# Patient Record
Sex: MALE | Race: BLACK OR AFRICAN AMERICAN | Employment: FULL TIME | URBAN - METROPOLITAN AREA
[De-identification: names, ages, dates, MRNs, and addresses within clinical notes are randomized per-mention and may not be internally consistent; named-entity substitution may affect disease eponyms.]

---

## 2017-01-05 NOTE — TELEPHONE ENCOUNTER
LOV: 7/1/16 for hyperlipidemia  BP at time of visit: 138/88     AmLODIPine Besylate 10 MG Oral Tab 90 tablet 1 7/1/2016       Sig :  TAKE ONE TABLET BY MOUTH ONCE DAILY       Route:   (none)       lisinopril 40 MG Oral Tab 90 tablet 1 7/1/2016       Sig :

## 2017-01-06 ENCOUNTER — OFFICE VISIT (OUTPATIENT)
Dept: FAMILY MEDICINE CLINIC | Facility: CLINIC | Age: 47
End: 2017-01-06

## 2017-01-06 ENCOUNTER — TELEPHONE (OUTPATIENT)
Dept: FAMILY MEDICINE CLINIC | Facility: CLINIC | Age: 47
End: 2017-01-06

## 2017-01-06 VITALS
SYSTOLIC BLOOD PRESSURE: 156 MMHG | TEMPERATURE: 99 F | DIASTOLIC BLOOD PRESSURE: 102 MMHG | HEART RATE: 108 BPM | OXYGEN SATURATION: 98 % | BODY MASS INDEX: 29 KG/M2 | WEIGHT: 207 LBS | RESPIRATION RATE: 16 BRPM

## 2017-01-06 DIAGNOSIS — R74.8 ELEVATED CK: ICD-10-CM

## 2017-01-06 DIAGNOSIS — I10 ESSENTIAL HYPERTENSION: Primary | ICD-10-CM

## 2017-01-06 DIAGNOSIS — H57.89 RED EYES: ICD-10-CM

## 2017-01-06 DIAGNOSIS — N52.9 ERECTILE DYSFUNCTION, UNSPECIFIED ERECTILE DYSFUNCTION TYPE: ICD-10-CM

## 2017-01-06 DIAGNOSIS — E78.00 PURE HYPERCHOLESTEROLEMIA: ICD-10-CM

## 2017-01-06 PROCEDURE — 99214 OFFICE O/P EST MOD 30 MIN: CPT | Performed by: FAMILY MEDICINE

## 2017-01-06 RX ORDER — AMLODIPINE BESYLATE 10 MG/1
TABLET ORAL
Qty: 90 TABLET | Refills: 1 | Status: SHIPPED | OUTPATIENT
Start: 2017-01-06 | End: 2017-06-21

## 2017-01-06 RX ORDER — LISINOPRIL 40 MG/1
TABLET ORAL
Qty: 90 TABLET | Refills: 1 | Status: SHIPPED | OUTPATIENT
Start: 2017-01-06 | End: 2017-06-21

## 2017-01-06 RX ORDER — TADALAFIL 10 MG/1
10 TABLET ORAL
Qty: 6 TABLET | Refills: 0 | Status: SHIPPED | OUTPATIENT
Start: 2017-01-06 | End: 2017-03-14

## 2017-01-06 RX ORDER — LISINOPRIL 40 MG/1
TABLET ORAL
Qty: 90 TABLET | Refills: 0 | OUTPATIENT
Start: 2017-01-06

## 2017-01-06 RX ORDER — AMLODIPINE BESYLATE 10 MG/1
TABLET ORAL
Qty: 90 TABLET | Refills: 0 | OUTPATIENT
Start: 2017-01-06

## 2017-01-06 NOTE — TELEPHONE ENCOUNTER
appt scheduled  Future Appointments  Date Time Provider Brian Shelia   1/6/2017 2:00 PM Anastasiia Goodman MD [facility]

## 2017-01-06 NOTE — PROGRESS NOTES
Jenniefr North is a 42year old male. HPI:   Patient presents for recheck of his hypertension. Out of med for 2 days. Has not been taking med on regular basis. No medication side effects. Home BP monitoring 140s/80s when on meds.     Requesting Multiple Vitamin (MULTI VITAMIN DAILY OR) Take by mouth. Disp:  Rfl:       Past Medical History   Diagnosis Date   • Unspecified essential hypertension    • Other and unspecified hyperlipidemia    • Pre-diabetes       History reviewed.  No pertinent past

## 2017-01-11 ENCOUNTER — NURSE ONLY (OUTPATIENT)
Dept: FAMILY MEDICINE CLINIC | Facility: CLINIC | Age: 47
End: 2017-01-11

## 2017-01-11 DIAGNOSIS — R74.8 ELEVATED CK: ICD-10-CM

## 2017-01-11 DIAGNOSIS — E78.00 PURE HYPERCHOLESTEROLEMIA: ICD-10-CM

## 2017-01-11 LAB
ALBUMIN SERPL-MCNC: 4.4 G/DL (ref 3.5–4.8)
ALP LIVER SERPL-CCNC: 101 U/L (ref 45–117)
ALT SERPL-CCNC: 99 U/L (ref 17–63)
AST SERPL-CCNC: 70 U/L (ref 15–41)
BILIRUB SERPL-MCNC: 0.6 MG/DL (ref 0.1–2)
BUN BLD-MCNC: 13 MG/DL (ref 8–20)
CALCIUM BLD-MCNC: 9.4 MG/DL (ref 8.3–10.3)
CHLORIDE: 105 MMOL/L (ref 101–111)
CHOLEST SMN-MCNC: 301 MG/DL (ref ?–200)
CK: 1774 IU/L (ref 39–308)
CKMB: 7.5 NG/ML (ref 0–5)
CO2: 26 MMOL/L (ref 22–32)
CREAT BLD-MCNC: 1.35 MG/DL (ref 0.7–1.3)
GLUCOSE BLD-MCNC: 124 MG/DL (ref 70–99)
HDLC SERPL-MCNC: 66 MG/DL (ref 45–?)
HDLC SERPL: 4.56 {RATIO} (ref ?–4.97)
LDLC SERPL CALC-MCNC: 210 MG/DL (ref ?–130)
M PROTEIN MFR SERPL ELPH: 7.9 G/DL (ref 6.1–8.3)
MB INDEX: <1 % (ref ?–4)
NONHDLC SERPL-MCNC: 235 MG/DL (ref ?–130)
POTASSIUM SERPL-SCNC: 4.4 MMOL/L (ref 3.6–5.1)
SODIUM SERPL-SCNC: 137 MMOL/L (ref 136–144)
TRIGLYCERIDES: 125 MG/DL (ref ?–150)
VLDL: 25 MG/DL (ref 5–40)

## 2017-01-11 PROCEDURE — 82553 CREATINE MB FRACTION: CPT | Performed by: FAMILY MEDICINE

## 2017-01-11 PROCEDURE — 82550 ASSAY OF CK (CPK): CPT | Performed by: FAMILY MEDICINE

## 2017-01-11 PROCEDURE — 36415 COLL VENOUS BLD VENIPUNCTURE: CPT | Performed by: FAMILY MEDICINE

## 2017-01-11 PROCEDURE — 80053 COMPREHEN METABOLIC PANEL: CPT | Performed by: FAMILY MEDICINE

## 2017-01-11 PROCEDURE — 80061 LIPID PANEL: CPT | Performed by: FAMILY MEDICINE

## 2017-03-13 ENCOUNTER — TELEPHONE (OUTPATIENT)
Dept: FAMILY MEDICINE CLINIC | Facility: CLINIC | Age: 47
End: 2017-03-13

## 2017-03-13 NOTE — TELEPHONE ENCOUNTER
Pt reports taking medication 2 times daily. Please advise if pt is suppose to take once daily or twice daily.         lisinopril 40 MG Oral Tab 90 tablet 1 1/6/2017       Sig :  TAKE ONE TABLET BY MOUTH ONCE DAILY       Route:   (none)       AmLODIPine Bes

## 2017-03-14 PROCEDURE — 83883 ASSAY NEPHELOMETRY NOT SPEC: CPT | Performed by: INTERNAL MEDICINE

## 2017-03-14 PROCEDURE — 82746 ASSAY OF FOLIC ACID SERUM: CPT | Performed by: INTERNAL MEDICINE

## 2017-03-14 PROCEDURE — 84165 PROTEIN E-PHORESIS SERUM: CPT | Performed by: INTERNAL MEDICINE

## 2017-03-14 PROCEDURE — 82607 VITAMIN B-12: CPT | Performed by: INTERNAL MEDICINE

## 2017-03-14 PROCEDURE — 82085 ASSAY OF ALDOLASE: CPT | Performed by: INTERNAL MEDICINE

## 2017-03-14 PROCEDURE — 83874 ASSAY OF MYOGLOBIN: CPT | Performed by: INTERNAL MEDICINE

## 2017-03-14 PROCEDURE — 86235 NUCLEAR ANTIGEN ANTIBODY: CPT | Performed by: INTERNAL MEDICINE

## 2017-03-14 PROCEDURE — 86038 ANTINUCLEAR ANTIBODIES: CPT | Performed by: INTERNAL MEDICINE

## 2017-03-14 PROCEDURE — 86334 IMMUNOFIX E-PHORESIS SERUM: CPT | Performed by: INTERNAL MEDICINE

## 2017-03-14 NOTE — TELEPHONE ENCOUNTER
Both meds are once daily. Does he have any home BP readings? He is overdue for OV. Please schedule this week.

## 2017-03-14 NOTE — TELEPHONE ENCOUNTER
NORRIS Vega  Patient advised he is only suppose to be taking medication once daily. Patient scheduled appointment for Monday. Advised patient to take blood pressure every day until his appointment and bring in readings for Dr. Shaan Vega.   Patient verbalized

## 2017-03-27 ENCOUNTER — TELEPHONE (OUTPATIENT)
Dept: FAMILY MEDICINE CLINIC | Facility: CLINIC | Age: 47
End: 2017-03-27

## 2017-03-27 NOTE — TELEPHONE ENCOUNTER
The ordered physician, in this case rheum, will be discussing this BW with you. I can tell him that CK level is 2022 and his liver function is elevated. Rest of BW looks ok. Please have pt call them to discuss BW further and next steps.

## 2017-03-29 ENCOUNTER — HOSPITAL ENCOUNTER (OUTPATIENT)
Age: 47
Discharge: HOME | End: 2017-03-29
Attending: EMERGENCY MEDICINE
Payer: COMMERCIAL

## 2017-03-29 VITALS
RESPIRATION RATE: 16 BRPM | TEMPERATURE: 99 F | DIASTOLIC BLOOD PRESSURE: 99 MMHG | HEART RATE: 89 BPM | OXYGEN SATURATION: 99 % | SYSTOLIC BLOOD PRESSURE: 151 MMHG

## 2017-03-29 DIAGNOSIS — K08.89 PAIN, DENTAL: Primary | ICD-10-CM

## 2017-03-29 PROCEDURE — 99214 OFFICE O/P EST MOD 30 MIN: CPT

## 2017-03-29 PROCEDURE — 99213 OFFICE O/P EST LOW 20 MIN: CPT

## 2017-03-29 RX ORDER — CLINDAMYCIN HYDROCHLORIDE 300 MG/1
300 CAPSULE ORAL 3 TIMES DAILY
Qty: 30 CAPSULE | Refills: 0 | Status: SHIPPED | OUTPATIENT
Start: 2017-03-29 | End: 2017-04-08

## 2017-03-29 NOTE — ED PROVIDER NOTES
Patient presents with:  Dental Problem (dental)    HPI:     Trish Maria is a 42year old male who presents with chief complaint of dental pain. Started about 3 days ago. Has been taking aleve which has helped.   States this tooth has bothered him pr

## 2017-06-21 ENCOUNTER — LAB ENCOUNTER (OUTPATIENT)
Dept: LAB | Age: 47
End: 2017-06-21
Attending: FAMILY MEDICINE
Payer: COMMERCIAL

## 2017-06-21 ENCOUNTER — OFFICE VISIT (OUTPATIENT)
Dept: FAMILY MEDICINE CLINIC | Facility: CLINIC | Age: 47
End: 2017-06-21

## 2017-06-21 VITALS
HEIGHT: 71 IN | OXYGEN SATURATION: 98 % | WEIGHT: 196.81 LBS | SYSTOLIC BLOOD PRESSURE: 130 MMHG | HEART RATE: 100 BPM | BODY MASS INDEX: 27.55 KG/M2 | TEMPERATURE: 98 F | RESPIRATION RATE: 14 BRPM | DIASTOLIC BLOOD PRESSURE: 84 MMHG

## 2017-06-21 DIAGNOSIS — I10 ESSENTIAL HYPERTENSION: Primary | ICD-10-CM

## 2017-06-21 DIAGNOSIS — R25.2 MUSCLE CRAMPING: ICD-10-CM

## 2017-06-21 DIAGNOSIS — R73.09 ELEVATED HEMOGLOBIN A1C: ICD-10-CM

## 2017-06-21 DIAGNOSIS — R35.0 INCREASED URINARY FREQUENCY: ICD-10-CM

## 2017-06-21 DIAGNOSIS — I10 ESSENTIAL HYPERTENSION: ICD-10-CM

## 2017-06-21 PROCEDURE — 82553 CREATINE MB FRACTION: CPT

## 2017-06-21 PROCEDURE — 83036 HEMOGLOBIN GLYCOSYLATED A1C: CPT

## 2017-06-21 PROCEDURE — 82550 ASSAY OF CK (CPK): CPT

## 2017-06-21 PROCEDURE — 36415 COLL VENOUS BLD VENIPUNCTURE: CPT

## 2017-06-21 PROCEDURE — 80053 COMPREHEN METABOLIC PANEL: CPT

## 2017-06-21 PROCEDURE — 99213 OFFICE O/P EST LOW 20 MIN: CPT | Performed by: FAMILY MEDICINE

## 2017-06-21 RX ORDER — AMLODIPINE BESYLATE 10 MG/1
TABLET ORAL
Qty: 90 TABLET | Refills: 3 | Status: SHIPPED | OUTPATIENT
Start: 2017-06-21 | End: 2018-01-31

## 2017-06-21 RX ORDER — LISINOPRIL 40 MG/1
TABLET ORAL
Qty: 90 TABLET | Refills: 3 | Status: SHIPPED | OUTPATIENT
Start: 2017-06-21 | End: 2018-01-31

## 2017-06-21 RX ORDER — AMLODIPINE BESYLATE 10 MG/1
TABLET ORAL
Qty: 90 TABLET | Refills: 1 | Status: CANCELLED | OUTPATIENT
Start: 2017-06-21

## 2017-06-21 RX ORDER — LISINOPRIL 40 MG/1
TABLET ORAL
Qty: 90 TABLET | Refills: 1 | Status: CANCELLED | OUTPATIENT
Start: 2017-06-21

## 2017-06-21 NOTE — TELEPHONE ENCOUNTER
Appointment scheduled.   Future Appointments  Date Time Provider Brian Shelia   6/21/2017 10:40 AM Elvira Jo MD [facility] [place]

## 2017-06-21 NOTE — PROGRESS NOTES
Chelita Javier is a 41year old male. HPI:   Patient presents for recheck of his hypertension. Taking meds daily. No med side effects. Has hx of elevated CK level of undetermined cause. Saw rheum once but has not followed up.   Wakes up often w surgical history.    Social History:      Smoking Status: Current Some Day Smoker         Packs/Day: 0.00  Years:           Types: Cigars    Smokeless Status: Never Used                        Alcohol Use: No                 Comment: social        REVIEW OF

## 2017-06-21 NOTE — TELEPHONE ENCOUNTER
LOV: 1/6/17 for HTN  BP at time of visit: 156/102     Left message for patient to inform due for 6 month follow up for HTN with Dr. Arnulfo Medrano.     AmLODIPine Besylate 10 MG Oral Tab 90 tablet 1 1/6/2017       Sig :  TAKE ONE TABLET BY MOUTH ONCE DAILY       Rout

## 2017-08-24 ENCOUNTER — TELEPHONE (OUTPATIENT)
Dept: FAMILY MEDICINE CLINIC | Facility: CLINIC | Age: 47
End: 2017-08-24

## 2017-08-24 NOTE — TELEPHONE ENCOUNTER
Patient was told to call Dr. Dipika Blair when his appointment with Dr. Verlena Apley is coming up so she can call Dr. Verlena Apley and let him know what is going on with the patient. Patient's appointment is Monday at 12:20. Please advise.

## 2017-08-24 NOTE — TELEPHONE ENCOUNTER
Pt called stated he has an appt with Dr. Verlena Apley Rhumotologist on Monday. Pt would like to talk to Dr Dipika Blair regarding this appt with this

## 2017-08-28 PROCEDURE — 86038 ANTINUCLEAR ANTIBODIES: CPT | Performed by: INTERNAL MEDICINE

## 2017-08-28 PROCEDURE — 82085 ASSAY OF ALDOLASE: CPT | Performed by: INTERNAL MEDICINE

## 2017-08-28 PROCEDURE — 83874 ASSAY OF MYOGLOBIN: CPT | Performed by: INTERNAL MEDICINE

## 2017-09-25 NOTE — TELEPHONE ENCOUNTER
LMTCB.   Re:  Encourage pt to complete test Dr. Heidy Duffy has recommended if he hasn't already done so

## 2017-10-05 ENCOUNTER — PATIENT OUTREACH (OUTPATIENT)
Dept: FAMILY MEDICINE CLINIC | Facility: CLINIC | Age: 47
End: 2017-10-05

## 2017-11-07 ENCOUNTER — TELEPHONE (OUTPATIENT)
Dept: FAMILY MEDICINE CLINIC | Facility: CLINIC | Age: 47
End: 2017-11-07

## 2017-11-07 NOTE — TELEPHONE ENCOUNTER
Patient had questions about prostate exam.  Patient wanted to know last time this was checked. Informed patient PSA screening blood work was done on 6/21/17 and if lab results are normal it is done yearly.   Also informed patient digital rectal exams are u

## 2017-12-08 ENCOUNTER — HOSPITAL ENCOUNTER (OUTPATIENT)
Age: 47
Discharge: HOME | End: 2017-12-08
Payer: COMMERCIAL

## 2017-12-08 VITALS
HEIGHT: 71 IN | SYSTOLIC BLOOD PRESSURE: 170 MMHG | BODY MASS INDEX: 27.3 KG/M2 | TEMPERATURE: 98 F | HEART RATE: 100 BPM | WEIGHT: 195 LBS | DIASTOLIC BLOOD PRESSURE: 100 MMHG | RESPIRATION RATE: 16 BRPM

## 2017-12-08 DIAGNOSIS — K08.89 PAIN, DENTAL: Primary | ICD-10-CM

## 2017-12-08 PROCEDURE — 99213 OFFICE O/P EST LOW 20 MIN: CPT

## 2017-12-08 PROCEDURE — 99212 OFFICE O/P EST SF 10 MIN: CPT

## 2017-12-08 RX ORDER — NAPROXEN 250 MG/1
250 TABLET ORAL
Qty: 20 TABLET | Refills: 0 | Status: SHIPPED | OUTPATIENT
Start: 2017-12-08 | End: 2018-01-31

## 2017-12-08 NOTE — ED PROVIDER NOTES
Patient Seen in: [id number] [facility] [place]    History   Patient presents with:  Dental Problem    Stated Complaint: tooth pain    60-year-old male presents today with complaints of right lower tooth pain.   Was here was here in March for similar sym Current:BP (!) 170/100   Pulse 100   Temp 98 °F (36.7 °C) (Temporal)   Resp 16   Ht 180.3 cm (5' 11\")   Wt 88.5 kg   BMI 27.20 kg/m²         Physical Exam   Constitutional: He is oriented to person, place, and time.  He appears well-developed and well-nour

## 2017-12-08 NOTE — ED INITIAL ASSESSMENT (HPI)
Pt seen 3/2017 for same tooth infection. States was given antibiotic and got better so he did not follow up with a dentist. Past couple of days worsening pain in same tooth. No facial swelling. No fever.  States has not taken his BP meds in 4 days and BP is

## 2018-01-31 ENCOUNTER — OFFICE VISIT (OUTPATIENT)
Dept: FAMILY MEDICINE CLINIC | Facility: CLINIC | Age: 48
End: 2018-01-31

## 2018-01-31 ENCOUNTER — TELEPHONE (OUTPATIENT)
Dept: FAMILY MEDICINE CLINIC | Facility: CLINIC | Age: 48
End: 2018-01-31

## 2018-01-31 VITALS
TEMPERATURE: 97 F | WEIGHT: 200 LBS | SYSTOLIC BLOOD PRESSURE: 132 MMHG | DIASTOLIC BLOOD PRESSURE: 86 MMHG | RESPIRATION RATE: 20 BRPM | OXYGEN SATURATION: 98 % | BODY MASS INDEX: 28 KG/M2 | HEIGHT: 71 IN | HEART RATE: 100 BPM

## 2018-01-31 DIAGNOSIS — Z79.899 MEDICATION MANAGEMENT: ICD-10-CM

## 2018-01-31 DIAGNOSIS — R39.89 URETHRAL PAIN: ICD-10-CM

## 2018-01-31 DIAGNOSIS — N48.89 PENILE PAIN: ICD-10-CM

## 2018-01-31 DIAGNOSIS — R00.2 PALPITATION: Primary | ICD-10-CM

## 2018-01-31 DIAGNOSIS — E78.00 PURE HYPERCHOLESTEROLEMIA: ICD-10-CM

## 2018-01-31 DIAGNOSIS — I10 ESSENTIAL HYPERTENSION: ICD-10-CM

## 2018-01-31 LAB
APPEARANCE: CLEAR
MULTISTIX LOT#: NORMAL NUMERIC
PH, URINE: 5.5 (ref 4.5–8)
PROTEIN (URINE DIPSTICK): 100 MG/DL
SPECIFIC GRAVITY: 1.01 (ref 1–1.03)
TSI SER-ACNC: 2.15 MIU/ML (ref 0.35–5.5)
URINE-COLOR: YELLOW
UROBILINOGEN,SEMI-QN: 0.2 MG/DL (ref 0–1.9)

## 2018-01-31 PROCEDURE — 87086 URINE CULTURE/COLONY COUNT: CPT | Performed by: FAMILY MEDICINE

## 2018-01-31 PROCEDURE — 81003 URINALYSIS AUTO W/O SCOPE: CPT | Performed by: FAMILY MEDICINE

## 2018-01-31 PROCEDURE — 84443 ASSAY THYROID STIM HORMONE: CPT | Performed by: FAMILY MEDICINE

## 2018-01-31 PROCEDURE — 99214 OFFICE O/P EST MOD 30 MIN: CPT | Performed by: FAMILY MEDICINE

## 2018-01-31 PROCEDURE — 93000 ELECTROCARDIOGRAM COMPLETE: CPT | Performed by: FAMILY MEDICINE

## 2018-01-31 RX ORDER — AMLODIPINE BESYLATE 10 MG/1
TABLET ORAL
Qty: 90 TABLET | Refills: 3 | Status: SHIPPED | OUTPATIENT
Start: 2018-01-31 | End: 2018-02-02

## 2018-01-31 RX ORDER — LISINOPRIL 40 MG/1
TABLET ORAL
Qty: 90 TABLET | Refills: 3 | Status: SHIPPED | OUTPATIENT
Start: 2018-01-31 | End: 2018-02-02

## 2018-01-31 NOTE — PROGRESS NOTES
Janey Felty is a 41year old male. HPI:   Patient presents for multiple issues. Recheck on hypertension. Taking meds daily. No med s/e. Does not check at home. No HA or dizziness reported.     Pain on bottom of shaft of penis for 4 months Medical History:   Diagnosis Date   • Elevated CK    • Other and unspecified hyperlipidemia    • Pre-diabetes    • Unspecified essential hypertension       History reviewed. No pertinent surgical history.    Social History:    Smoking status: Current Some D unknown etio. Encouraged him to get test scheduled soon as possible  Due for annual px in June.  Further recs after test results

## 2018-01-31 NOTE — TELEPHONE ENCOUNTER
Patient called and wanted to let Dr Olga Farley know that he made his appointment for his MRI and Stress Test for 02/14/18 at 10 am.

## 2018-02-01 ENCOUNTER — NURSE ONLY (OUTPATIENT)
Dept: FAMILY MEDICINE CLINIC | Facility: CLINIC | Age: 48
End: 2018-02-01

## 2018-02-01 DIAGNOSIS — R39.89 URETHRAL PAIN: Primary | ICD-10-CM

## 2018-02-01 PROCEDURE — 87591 N.GONORRHOEAE DNA AMP PROB: CPT | Performed by: FAMILY MEDICINE

## 2018-02-01 PROCEDURE — 87491 CHLMYD TRACH DNA AMP PROBE: CPT | Performed by: FAMILY MEDICINE

## 2018-02-02 LAB
C TRACH DNA SPEC QL NAA+PROBE: NEGATIVE
N GONORRHOEA DNA SPEC QL NAA+PROBE: NEGATIVE

## 2018-02-02 RX ORDER — LISINOPRIL 40 MG/1
TABLET ORAL
Qty: 90 TABLET | Refills: 3 | Status: SHIPPED
Start: 2018-02-02 | End: 2020-06-01

## 2018-02-02 RX ORDER — AMLODIPINE BESYLATE 10 MG/1
TABLET ORAL
Qty: 90 TABLET | Refills: 3 | Status: SHIPPED
Start: 2018-02-02 | End: 2020-06-01

## 2018-02-02 NOTE — TELEPHONE ENCOUNTER
lisinopril 40 MG Oral Tab 90 tablet 3 1/31/2018     AmLODIPine Besylate 10 MG Oral Tab 90 tablet 3 1/31/2018     Last labs 01/31/18 Assay,thyroid and urine. Last seen on 01/31/18. /86.   Future Appointments  Date Time Provider Brian Bass   2/1

## 2018-02-02 NOTE — TELEPHONE ENCOUNTER
From: Dago Anglin  Sent: 2/2/2018 9:46 AM CST  Subject: Medication Renewal Request    Dago Anglin would like a refill of the following medications:     lisinopril 40 MG Oral Tab Lashon Solomon MD]     AmLODIPine Besylate 10 MG Oral Tab Todd Sutton

## 2018-02-14 ENCOUNTER — PATIENT MESSAGE (OUTPATIENT)
Dept: FAMILY MEDICINE CLINIC | Facility: CLINIC | Age: 48
End: 2018-02-14

## 2018-02-14 NOTE — TELEPHONE ENCOUNTER
From: Alissa Alvarenga  To: Justen Patton MD  Sent: 2/14/2018 11:31 AM CST  Subject: Other    The Mri test didn't go as plan ! I started to cramp very bad once inside and was afraid the test won't be accurate!  Being in the tube I could tolerate but when

## 2018-02-16 NOTE — TELEPHONE ENCOUNTER
Called pt to follow up. He got in touch with Dr. Desirae Flowers and they will order MRI with sedation.

## 2018-06-29 NOTE — LETTER
09/26/22    Jazlyn Hays   [address]            Dear Demarcus Monday records indicate that you have outstanding lab work and/or testing that was ordered for you and has not yet been completed:  Lab Frequency Next Occurrence   CBC WITH DIFFERENTIAL WITH PLATELET Once 14/55/3021   COMP METABOLIC PANEL (14) Once 08/79/8345   LIPID PANEL Once 07/27/2022   HEMOGLOBIN A1C Once 07/27/2022   TSH W REFLEX TO FREE T4 Once 07/27/2022   PSA SCREEN Once 07/27/2022      To provide you with the best possible care, please complete these orders at your earliest convenience. If you have recently completed these orders please disregard this letter. To schedule labwork please call to schedule at [phone]. *If you prefer to use [facility] for your labs please let us know so we can fax your orders. To schedule Imaging or Procedures please call Central Scheduling at [phone]. If you have any questions please call the office at [phone].      Thank you,    [facility]  ACTIVITY  - Use sling at all times while numb. . Must sleep in sling until full sensation and control returns. May remove sling only when sensation and control returns. Range of motion as tolerated  - Limited activity today; increase activity tomorrow, but no vigorous exercise. .Protect arm while numb. -  Keep dressing dry and intact for 3 days. May remove and cover with waterproof band aids to shower. No tub baths for 2 weeks. - Physical therapy as scheduled. - Ice pack to area       DIET  - Clear liquids until no nausea or vomiting.  - Advance to regular diet as tolerated. Avoid greasy and spicy food today to reduce nausea. PAIN  - Expect a moderate amount of pain. Begin taking pain pills when sensation returns or at dinner/ bedtime even if still numb. - Take pain medication as directed by your doctor. If no prescription for pain is sent home with you, take the appropriate dose of your commonly used pain medication.  - Call your doctor if pain is NOT relieved by medication. - DO NOT take aspirin or blood thinners until directed by your doctor. Take pills with food to reduce nausea  May cause constipation Use stool softener    DRESSING CARE  - Keep dressing clean and dry for 3 days. May remove and cover with waterproof band aids to shower. No tub baths for 2 weeks. FOLLOW UP PHONE CALL  - Call will be made by nursing staff. - If you have any problems or concerns, call your doctor as needed. CALL YOUR DOCTOR IF  - Excessive bleeding that does not stop after holding mild pressure over the area for 15 minutes. - Any color, temperature, or sensation changes in the operative arm/hand.  - Temperature of 101F degrees or above. - Green or yellow, smelly drainage.  - Nausea and vomiting that does not stop by bedtime. AFTER ANESTHESIA   - For the next 24 hours:  DO NOT Drive; Drink Alcoholic Beverages;  Make important decisions.  - Be aware of dizziness following anesthesia and while taking pain medication.  - Plan to stay within one hour's drive of the hospital.    Neno Henderson DATE/TIME__Keep scheduled appointment ___________________________________    Juan Mo PHONE NUMBER____[phone]__________________

## 2018-07-27 ENCOUNTER — TELEPHONE (OUTPATIENT)
Dept: FAMILY MEDICINE CLINIC | Facility: CLINIC | Age: 48
End: 2018-07-27

## 2018-07-27 NOTE — TELEPHONE ENCOUNTER
----- Message from Carlos Moran MD sent at 7/27/2018  3:26 PM CDT -----  Regarding: RE: overdue lab  I would like him to get this done. Lets try to get this scheduled.   Lynne Meadows  ----- Message -----  From: Venancio Jo  Sent: 7/27/2018   2:45 PM  To: Irma Mares

## 2018-07-31 NOTE — TELEPHONE ENCOUNTER
Patient advised and verbalized understanding. Appointment scheduled.   Future Appointments  Date Time Provider Brian Bass   8/4/2018 10:00 AM [facility] N[facility] [place]

## 2018-08-04 ENCOUNTER — NURSE ONLY (OUTPATIENT)
Dept: FAMILY MEDICINE CLINIC | Facility: CLINIC | Age: 48
End: 2018-08-04
Payer: COMMERCIAL

## 2018-08-04 DIAGNOSIS — R73.09 ELEVATED HEMOGLOBIN A1C: ICD-10-CM

## 2018-08-04 DIAGNOSIS — E78.49 OTHER HYPERLIPIDEMIA: ICD-10-CM

## 2018-08-04 DIAGNOSIS — R74.8 ELEVATED CK: Primary | ICD-10-CM

## 2018-08-04 LAB
ALBUMIN SERPL-MCNC: 4.4 G/DL (ref 3.5–4.8)
ALBUMIN/GLOB SERPL: 1 {RATIO} (ref 1–2)
ALP LIVER SERPL-CCNC: 110 U/L (ref 45–117)
ALT SERPL-CCNC: 78 U/L (ref 17–63)
ANION GAP SERPL CALC-SCNC: 12 MMOL/L (ref 0–18)
AST SERPL-CCNC: 53 U/L (ref 15–41)
BASOPHILS # BLD AUTO: 0.08 X10(3) UL (ref 0–0.1)
BASOPHILS NFR BLD AUTO: 1 %
BILIRUB SERPL-MCNC: 0.6 MG/DL (ref 0.1–2)
BUN BLD-MCNC: 19 MG/DL (ref 8–20)
BUN/CREAT SERPL: 13.2 (ref 10–20)
CALCIUM BLD-MCNC: 10.7 MG/DL (ref 8.3–10.3)
CHLORIDE SERPL-SCNC: 97 MMOL/L (ref 101–111)
CHOLEST SMN-MCNC: 276 MG/DL (ref ?–200)
CK SERPL-CCNC: 1130 IU/L (ref 39–308)
CO2 SERPL-SCNC: 27 MMOL/L (ref 22–32)
COMPLEXED PSA SERPL-MCNC: 1.8 NG/ML (ref 0.01–4)
CREAT BLD-MCNC: 1.44 MG/DL (ref 0.7–1.3)
EOSINOPHIL # BLD AUTO: 0.1 X10(3) UL (ref 0–0.3)
EOSINOPHIL NFR BLD AUTO: 1.3 %
ERYTHROCYTE [DISTWIDTH] IN BLOOD BY AUTOMATED COUNT: 11.9 % (ref 11.5–16)
EST. AVERAGE GLUCOSE BLD GHB EST-MCNC: 105 MG/DL (ref 68–126)
GLOBULIN PLAS-MCNC: 4.3 G/DL (ref 2.5–3.7)
GLUCOSE BLD-MCNC: 99 MG/DL (ref 70–99)
HBA1C MFR BLD HPLC: 5.3 % (ref ?–5.7)
HCT VFR BLD AUTO: 42.6 % (ref 37–53)
HDLC SERPL-MCNC: 60 MG/DL (ref 40–59)
HGB BLD-MCNC: 14.2 G/DL (ref 13–17)
IMMATURE GRANULOCYTE COUNT: 0.02 X10(3) UL (ref 0–1)
IMMATURE GRANULOCYTE RATIO %: 0.3 %
LDLC SERPL CALC-MCNC: 188 MG/DL (ref ?–100)
LYMPHOCYTES # BLD AUTO: 2.18 X10(3) UL (ref 0.9–4)
LYMPHOCYTES NFR BLD AUTO: 27.9 %
M PROTEIN MFR SERPL ELPH: 8.7 G/DL (ref 6.1–8.3)
MCH RBC QN AUTO: 29.2 PG (ref 27–33.2)
MCHC RBC AUTO-ENTMCNC: 33.3 G/DL (ref 31–37)
MCV RBC AUTO: 87.5 FL (ref 80–99)
MONOCYTES # BLD AUTO: 0.59 X10(3) UL (ref 0.1–1)
MONOCYTES NFR BLD AUTO: 7.6 %
NEUTROPHIL ABS PRELIM: 4.84 X10 (3) UL (ref 1.3–6.7)
NEUTROPHILS # BLD AUTO: 4.84 X10(3) UL (ref 1.3–6.7)
NEUTROPHILS NFR BLD AUTO: 61.9 %
NONHDLC SERPL-MCNC: 216 MG/DL (ref ?–130)
OSMOLALITY SERPL CALC.SUM OF ELEC: 284 MOSM/KG (ref 275–295)
PLATELET # BLD AUTO: 419 10(3)UL (ref 150–450)
POTASSIUM SERPL-SCNC: 4 MMOL/L (ref 3.6–5.1)
RBC # BLD AUTO: 4.87 X10(6)UL (ref 4.3–5.7)
RED CELL DISTRIBUTION WIDTH-SD: 38.4 FL (ref 35.1–46.3)
SODIUM SERPL-SCNC: 136 MMOL/L (ref 136–144)
TRIGL SERPL-MCNC: 139 MG/DL (ref 30–149)
VLDLC SERPL CALC-MCNC: 28 MG/DL (ref 0–30)
WBC # BLD AUTO: 7.8 X10(3) UL (ref 4–13)

## 2018-08-04 PROCEDURE — 80061 LIPID PANEL: CPT | Performed by: FAMILY MEDICINE

## 2018-08-04 PROCEDURE — 83021 HEMOGLOBIN CHROMOTOGRAPHY: CPT | Performed by: FAMILY MEDICINE

## 2018-08-04 PROCEDURE — 80053 COMPREHEN METABOLIC PANEL: CPT | Performed by: FAMILY MEDICINE

## 2018-08-04 PROCEDURE — 83036 HEMOGLOBIN GLYCOSYLATED A1C: CPT | Performed by: FAMILY MEDICINE

## 2018-08-04 PROCEDURE — 36415 COLL VENOUS BLD VENIPUNCTURE: CPT | Performed by: FAMILY MEDICINE

## 2018-08-04 PROCEDURE — 82550 ASSAY OF CK (CPK): CPT | Performed by: FAMILY MEDICINE

## 2018-08-04 PROCEDURE — 85025 COMPLETE CBC W/AUTO DIFF WBC: CPT | Performed by: FAMILY MEDICINE

## 2018-08-04 PROCEDURE — 85660 RBC SICKLE CELL TEST: CPT | Performed by: FAMILY MEDICINE

## 2018-08-06 LAB
HEMOGLOBIN - OTHER: 0 %
HEMOGLOBIN A2: 3.7 %
HEMOGLOBIN A: 57.7 %
HEMOGLOBIN C: 0 %
HEMOGLOBIN E: 0 %
HEMOGLOBIN F: 0.3 %
HEMOGLOBIN S: 38.3 %
SICKLE CELL SOLUBILITY: POSITIVE

## 2018-08-08 ENCOUNTER — MED REC SCAN ONLY (OUTPATIENT)
Dept: FAMILY MEDICINE CLINIC | Facility: CLINIC | Age: 48
End: 2018-08-08

## 2018-08-10 ENCOUNTER — PATIENT MESSAGE (OUTPATIENT)
Dept: FAMILY MEDICINE CLINIC | Facility: CLINIC | Age: 48
End: 2018-08-10

## 2018-08-11 NOTE — TELEPHONE ENCOUNTER
From: Roman Santiago  To: Shanti Paez MD  Sent: 8/10/2018 5:50 PM CDT  Subject: Test Results Question    I have a question about SICKLE SOLUBILITY BILL resulted on 8/6/18, 10:45 AM.    What dose that meaner billed? ?

## 2018-10-15 ENCOUNTER — TELEPHONE (OUTPATIENT)
Dept: FAMILY MEDICINE CLINIC | Facility: CLINIC | Age: 48
End: 2018-10-15

## 2018-10-15 NOTE — TELEPHONE ENCOUNTER
Patient has ordered viagra off [employer]  Patient states he needs an rx sent to [address] [facility] in [facility]. Patient states he has used Cialis in the past but wants to use Viagra because he has the coupon.   Please advise if we can send this to the pharmacy

## 2018-10-17 RX ORDER — SILDENAFIL 25 MG/1
25 TABLET, FILM COATED ORAL
Qty: 8 TABLET | Refills: 0 | Status: SHIPPED | OUTPATIENT
Start: 2018-10-17 | End: 2018-10-27 | Stop reason: ALTCHOICE

## 2018-10-27 ENCOUNTER — OFFICE VISIT (OUTPATIENT)
Dept: FAMILY MEDICINE CLINIC | Facility: CLINIC | Age: 48
End: 2018-10-27
Payer: COMMERCIAL

## 2018-10-27 DIAGNOSIS — Z51.81 THERAPEUTIC DRUG MONITORING: ICD-10-CM

## 2018-10-27 DIAGNOSIS — R74.8 ELEVATED CK: ICD-10-CM

## 2018-10-27 DIAGNOSIS — N52.9 ERECTILE DYSFUNCTION, UNSPECIFIED ERECTILE DYSFUNCTION TYPE: ICD-10-CM

## 2018-10-27 DIAGNOSIS — I10 ESSENTIAL HYPERTENSION: ICD-10-CM

## 2018-10-27 DIAGNOSIS — Z00.00 ANNUAL PHYSICAL EXAM: Primary | ICD-10-CM

## 2018-10-27 DIAGNOSIS — E78.49 OTHER HYPERLIPIDEMIA: ICD-10-CM

## 2018-10-27 PROCEDURE — 99396 PREV VISIT EST AGE 40-64: CPT | Performed by: FAMILY MEDICINE

## 2018-10-27 RX ORDER — TADALAFIL 10 MG/1
10 TABLET ORAL
Qty: 30 TABLET | Refills: 0 | Status: SHIPPED | OUTPATIENT
Start: 2018-10-27 | End: 2018-10-29

## 2018-10-27 NOTE — PROGRESS NOTES
Pilo Judd is a 57year old male who presents for a complete physical exam.   HPI:   No concerns today. Hx of elev CK. Over due for MRI ordered by rheum. Also did not get cardiac testing done. Has not had palp or CP recently.   Denies any muscle BY MOUTH ONCE DAILY Disp: 90 tablet Rfl: 3   Sildenafil Citrate 25 MG Oral Tab Take 1 tablet (25 mg total) by mouth daily as needed for Erectile Dysfunction. Disp: 30 tablet Rfl: 0   diazepam 5 MG Oral Tab Take 2 tabs 1 hour before each MRI .  Make sure you disk,conjunctiva are clear  NECK: supple,no adenopathy,no bruits  CHEST: no chest tenderness  BREAST: no dominant or suspicious mass  LUNGS: clear to auscultation  CARDIO: RRR without murmur  GI: good BS's,no masses, HSM or tenderness  : two descended te

## 2018-10-29 ENCOUNTER — TELEPHONE (OUTPATIENT)
Dept: FAMILY MEDICINE CLINIC | Facility: CLINIC | Age: 48
End: 2018-10-29

## 2018-10-29 RX ORDER — SILDENAFIL 25 MG/1
25 TABLET, FILM COATED ORAL
Qty: 30 TABLET | Refills: 0 | Status: SHIPPED | OUTPATIENT
Start: 2018-10-29 | End: 2020-03-09 | Stop reason: ALTCHOICE

## 2018-10-29 NOTE — TELEPHONE ENCOUNTER
Spoke with patient - patient states Cialis generic is too expensive - $1100.00  Would like to go back to the viagra - generic  Patient states he did not  the cialis.   rx pended please advise

## 2018-10-30 VITALS
HEIGHT: 71 IN | SYSTOLIC BLOOD PRESSURE: 132 MMHG | OXYGEN SATURATION: 96 % | RESPIRATION RATE: 16 BRPM | BODY MASS INDEX: 28.42 KG/M2 | HEART RATE: 96 BPM | TEMPERATURE: 99 F | WEIGHT: 203 LBS | DIASTOLIC BLOOD PRESSURE: 86 MMHG

## 2019-01-02 ENCOUNTER — TELEPHONE (OUTPATIENT)
Dept: FAMILY MEDICINE CLINIC | Facility: CLINIC | Age: 49
End: 2019-01-02

## 2019-02-13 RX ORDER — AMLODIPINE BESYLATE 10 MG/1
TABLET ORAL
Qty: 90 TABLET | Refills: 0 | Status: SHIPPED | OUTPATIENT
Start: 2019-02-13 | End: 2019-05-19

## 2019-02-13 RX ORDER — LISINOPRIL 40 MG/1
TABLET ORAL
Qty: 90 TABLET | Refills: 0 | Status: SHIPPED | OUTPATIENT
Start: 2019-02-13 | End: 2019-05-19

## 2019-02-13 NOTE — TELEPHONE ENCOUNTER
LOV 10/27/18 for his annual exam.    lisinopril 40 MG Oral Tab 90 tablet 3 2/2/2018    Sig :  TAKE ONE TABLET BY MOUTH ONCE DAILY     Route:   (none)       AmLODIPine Besylate 10 MG Oral Tab 90 tablet 3 2/2/2018    Sig :  TAKE ONE TABLET BY MOUTH ONCE Duane Mckeon

## 2019-04-12 ENCOUNTER — HOSPITAL ENCOUNTER (OUTPATIENT)
Age: 49
Discharge: HOME | End: 2019-04-12
Payer: COMMERCIAL

## 2019-04-12 VITALS
TEMPERATURE: 98 F | WEIGHT: 204 LBS | OXYGEN SATURATION: 100 % | HEART RATE: 111 BPM | SYSTOLIC BLOOD PRESSURE: 162 MMHG | RESPIRATION RATE: 18 BRPM | DIASTOLIC BLOOD PRESSURE: 113 MMHG | BODY MASS INDEX: 28.56 KG/M2 | HEIGHT: 71 IN

## 2019-04-12 DIAGNOSIS — R03.0 ELEVATED BLOOD PRESSURE READING: ICD-10-CM

## 2019-04-12 DIAGNOSIS — K08.89 PAIN, DENTAL: Primary | ICD-10-CM

## 2019-04-12 PROCEDURE — 99213 OFFICE O/P EST LOW 20 MIN: CPT

## 2019-04-12 PROCEDURE — 99214 OFFICE O/P EST MOD 30 MIN: CPT

## 2019-04-12 RX ORDER — ACETAMINOPHEN 500 MG
1000 TABLET ORAL ONCE
Status: COMPLETED | OUTPATIENT
Start: 2019-04-12 | End: 2019-04-12

## 2019-04-12 RX ORDER — PENICILLIN V POTASSIUM 500 MG/1
500 TABLET ORAL 4 TIMES DAILY
Qty: 40 TABLET | Refills: 0 | Status: SHIPPED | OUTPATIENT
Start: 2019-04-12 | End: 2019-04-22

## 2019-04-12 NOTE — ED PROVIDER NOTES
Patient Seen in: [facility] [place]    History   Patient presents with:  Dental Problem (dental)    Stated Complaint: tooth problems    HPI    CHIEF COMPLAINT: Tooth pain     HISTORY OF PRESENT ILLNESS: Patient is a 24-year-old male who pres surgical history. Family history reviewed and is not pertinent to presenting problem.     Social History    Tobacco Use      Smoking status: Never Smoker      Smokeless tobacco: Never Used    Alcohol use: No      Alcohol/week: 0.0 oz      Comment: social dental pain. Patient has an appointment for evaluation by dentist next week. For now we will place the patient on Pen-Vee K and he can continue ibuprofen or Tylenol as needed for pain management.   I did encourage the patient to resume his blood pressure

## 2019-04-12 NOTE — ED INITIAL ASSESSMENT (HPI)
Pt here for rt lower tooth pain for last couple days. States was seen here 4-5 months ago for same issue.

## 2019-05-20 RX ORDER — LISINOPRIL 40 MG/1
TABLET ORAL
Qty: 90 TABLET | Refills: 0 | Status: SHIPPED | OUTPATIENT
Start: 2019-05-20 | End: 2019-08-19

## 2019-05-20 RX ORDER — AMLODIPINE BESYLATE 10 MG/1
TABLET ORAL
Qty: 90 TABLET | Refills: 0 | Status: SHIPPED | OUTPATIENT
Start: 2019-05-20 | End: 2019-08-19

## 2019-05-20 NOTE — TELEPHONE ENCOUNTER
LOV 10/27/18 for an annual exam.  BP at that visit: 132/86. Both last filled 2/13/19 #90. No future appointments.

## 2019-08-22 RX ORDER — LISINOPRIL 40 MG/1
TABLET ORAL
Qty: 90 TABLET | Refills: 0 | Status: SHIPPED | OUTPATIENT
Start: 2019-08-22 | End: 2019-12-09

## 2019-08-22 RX ORDER — AMLODIPINE BESYLATE 10 MG/1
TABLET ORAL
Qty: 90 TABLET | Refills: 0 | Status: SHIPPED | OUTPATIENT
Start: 2019-08-22 | End: 2019-12-09

## 2019-09-13 ENCOUNTER — OFFICE VISIT (OUTPATIENT)
Dept: FAMILY MEDICINE CLINIC | Facility: CLINIC | Age: 49
End: 2019-09-13
Payer: COMMERCIAL

## 2019-09-13 DIAGNOSIS — R35.0 INCREASED FREQUENCY OF URINATION: ICD-10-CM

## 2019-09-13 DIAGNOSIS — R25.2 MUSCLE CRAMPS: Primary | ICD-10-CM

## 2019-09-13 PROCEDURE — 99214 OFFICE O/P EST MOD 30 MIN: CPT | Performed by: FAMILY MEDICINE

## 2019-09-13 NOTE — PROGRESS NOTES
Patient presents with:  Cramping: rt leg per pt       HPI:    Patient ID: Dominic Sanchez is a 54year old male. C/o generalized muscle cramps in hands and legs. Not painful but feels spasms. Lasts few seconds. On and off past 3 weeks.  Triggered by exe hyperlipidemia    • Pre-diabetes    • Unspecified essential hypertension       No past surgical history on file.    Family History   Problem Relation Age of Onset   • Hypertension Mother    • Diabetes Mother    • Diabetes Brother    • Cancer Neg    • Lipids Imaging & Referrals:  None

## 2019-09-15 LAB
ALBUMIN/GLOBULIN RATIO: 1.5 (CALC) (ref 1–2.5)
ALBUMIN: 4.8 G/DL (ref 3.6–5.1)
ALKALINE PHOSPHATASE: 111 U/L (ref 40–115)
ALT: 68 U/L (ref 9–46)
AST: 54 U/L (ref 10–40)
BILIRUBIN, TOTAL: 0.6 MG/DL (ref 0.2–1.2)
BUN: 14 MG/DL (ref 7–25)
CALCIUM: 10.7 MG/DL (ref 8.6–10.3)
CARBON DIOXIDE: 27 MMOL/L (ref 20–32)
CHLORIDE: 100 MMOL/L (ref 98–110)
CREATINE KINASE, TOTAL: 1878 U/L (ref 44–196)
CREATININE: 1.22 MG/DL (ref 0.6–1.35)
EGFR IF AFRICN AM: 81 ML/MIN/1.73M2
EGFR IF NONAFRICN AM: 70 ML/MIN/1.73M2
GLOBULIN: 3.2 G/DL (CALC) (ref 1.9–3.7)
GLUCOSE: 153 MG/DL (ref 65–139)
HEMOGLOBIN A1C: 5.7 % OF TOTAL HGB
POTASSIUM: 4.7 MMOL/L (ref 3.5–5.3)
PROTEIN, TOTAL: 8 G/DL (ref 6.1–8.1)
SODIUM: 138 MMOL/L (ref 135–146)
TSH: 1.5 MIU/L (ref 0.4–4.5)

## 2019-09-16 VITALS
BODY MASS INDEX: 27.89 KG/M2 | WEIGHT: 199.19 LBS | TEMPERATURE: 98 F | HEART RATE: 95 BPM | HEIGHT: 71 IN | DIASTOLIC BLOOD PRESSURE: 88 MMHG | RESPIRATION RATE: 18 BRPM | OXYGEN SATURATION: 99 % | SYSTOLIC BLOOD PRESSURE: 140 MMHG

## 2019-12-09 RX ORDER — LISINOPRIL 40 MG/1
TABLET ORAL
Qty: 90 TABLET | Refills: 1 | Status: SHIPPED | OUTPATIENT
Start: 2019-12-09 | End: 2019-12-18

## 2019-12-09 RX ORDER — AMLODIPINE BESYLATE 10 MG/1
TABLET ORAL
Qty: 90 TABLET | Refills: 1 | Status: SHIPPED | OUTPATIENT
Start: 2019-12-09 | End: 2019-12-18

## 2019-12-09 NOTE — TELEPHONE ENCOUNTER
LOV 9/13/19 for muscle cramps, frequency. BP: 140/88    Both last filled on 8/22/19 for a 90d supply. Future Appointments   Date Time Provider Brian Bass   12/18/2019 10:40 AM Blaise Colbert MD [facility] [place]     Please advise.

## 2019-12-18 ENCOUNTER — OFFICE VISIT (OUTPATIENT)
Dept: FAMILY MEDICINE CLINIC | Facility: CLINIC | Age: 49
End: 2019-12-18

## 2019-12-18 VITALS
HEART RATE: 96 BPM | RESPIRATION RATE: 20 BRPM | WEIGHT: 206 LBS | SYSTOLIC BLOOD PRESSURE: 178 MMHG | BODY MASS INDEX: 28.84 KG/M2 | TEMPERATURE: 98 F | HEIGHT: 71 IN | DIASTOLIC BLOOD PRESSURE: 98 MMHG | OXYGEN SATURATION: 99 %

## 2019-12-18 DIAGNOSIS — M54.9 BACK PAIN, UNSPECIFIED BACK LOCATION, UNSPECIFIED BACK PAIN LATERALITY, UNSPECIFIED CHRONICITY: ICD-10-CM

## 2019-12-18 DIAGNOSIS — R73.09 ELEVATED HEMOGLOBIN A1C: ICD-10-CM

## 2019-12-18 DIAGNOSIS — Z51.81 THERAPEUTIC DRUG MONITORING: ICD-10-CM

## 2019-12-18 DIAGNOSIS — R25.2 MUSCLE CRAMPS: ICD-10-CM

## 2019-12-18 DIAGNOSIS — Z23 NEED FOR VACCINATION: ICD-10-CM

## 2019-12-18 DIAGNOSIS — I10 ESSENTIAL HYPERTENSION: ICD-10-CM

## 2019-12-18 DIAGNOSIS — R74.8 ELEVATED CK: ICD-10-CM

## 2019-12-18 DIAGNOSIS — E78.49 OTHER HYPERLIPIDEMIA: ICD-10-CM

## 2019-12-18 DIAGNOSIS — Z00.00 ANNUAL PHYSICAL EXAM: Primary | ICD-10-CM

## 2019-12-18 PROCEDURE — 90686 IIV4 VACC NO PRSV 0.5 ML IM: CPT | Performed by: FAMILY MEDICINE

## 2019-12-18 PROCEDURE — 99396 PREV VISIT EST AGE 40-64: CPT | Performed by: FAMILY MEDICINE

## 2019-12-18 PROCEDURE — 90471 IMMUNIZATION ADMIN: CPT | Performed by: FAMILY MEDICINE

## 2019-12-21 ENCOUNTER — NURSE ONLY (OUTPATIENT)
Dept: FAMILY MEDICINE CLINIC | Facility: CLINIC | Age: 49
End: 2019-12-21

## 2019-12-21 DIAGNOSIS — Z00.00 ANNUAL PHYSICAL EXAM: Primary | ICD-10-CM

## 2019-12-21 DIAGNOSIS — Z00.00 ANNUAL PHYSICAL EXAM: ICD-10-CM

## 2019-12-21 PROCEDURE — 84443 ASSAY THYROID STIM HORMONE: CPT | Performed by: FAMILY MEDICINE

## 2019-12-21 PROCEDURE — 85025 COMPLETE CBC W/AUTO DIFF WBC: CPT | Performed by: FAMILY MEDICINE

## 2019-12-21 PROCEDURE — 80061 LIPID PANEL: CPT | Performed by: FAMILY MEDICINE

## 2019-12-21 PROCEDURE — 80053 COMPREHEN METABOLIC PANEL: CPT | Performed by: FAMILY MEDICINE

## 2019-12-21 PROCEDURE — 82550 ASSAY OF CK (CPK): CPT | Performed by: FAMILY MEDICINE

## 2019-12-21 PROCEDURE — 84550 ASSAY OF BLOOD/URIC ACID: CPT | Performed by: FAMILY MEDICINE

## 2019-12-21 PROCEDURE — 83036 HEMOGLOBIN GLYCOSYLATED A1C: CPT | Performed by: FAMILY MEDICINE

## 2019-12-21 PROCEDURE — 83735 ASSAY OF MAGNESIUM: CPT | Performed by: FAMILY MEDICINE

## 2019-12-21 NOTE — PROGRESS NOTES
Vikrammadhuri Humphrey presents today for nurse visit. Labs ordered by Dr. Ramiro Jackson. 1 green, 1 lavender drawn from left AC area with straight needle. Patient tolerated well. Left office in stable condition.

## 2019-12-24 ENCOUNTER — TELEPHONE (OUTPATIENT)
Dept: FAMILY MEDICINE CLINIC | Facility: CLINIC | Age: 49
End: 2019-12-24

## 2019-12-24 NOTE — TELEPHONE ENCOUNTER
----- Message from Miracle Brooks MD sent at 12/24/2019  9:34 AM CST -----  CK level high 1301  Liver function, kidney function all slightly elevated. Lipids panel worse. Need to address this after elev CK addressed.  Cardio consult done in past and they

## 2020-01-13 ENCOUNTER — TELEPHONE (OUTPATIENT)
Dept: FAMILY MEDICINE CLINIC | Facility: CLINIC | Age: 50
End: 2020-01-13

## 2020-01-13 NOTE — TELEPHONE ENCOUNTER
Noted. Seeing Dr Desirae Flowers 2/12    Future Appointments   Date Time Provider Brian Bass   1/17/2020 12:20 PM Tena Blackman MD [facility] [place]   2/12/2020 10:40 AM Bentley Aguayo DO [facility]

## 2020-01-24 ENCOUNTER — NURSE ONLY (OUTPATIENT)
Dept: FAMILY MEDICINE CLINIC | Facility: CLINIC | Age: 50
End: 2020-01-24

## 2020-01-24 ENCOUNTER — TELEPHONE (OUTPATIENT)
Dept: FAMILY MEDICINE CLINIC | Facility: CLINIC | Age: 50
End: 2020-01-24

## 2020-01-24 VITALS — DIASTOLIC BLOOD PRESSURE: 86 MMHG | SYSTOLIC BLOOD PRESSURE: 146 MMHG

## 2020-01-24 RX ORDER — AMOXICILLIN 500 MG/1
500 CAPSULE ORAL 3 TIMES DAILY
Qty: 30 CAPSULE | Refills: 0 | Status: SHIPPED | OUTPATIENT
Start: 2020-01-24 | End: 2020-02-03

## 2020-01-24 NOTE — TELEPHONE ENCOUNTER
Patient has complaints of tooth ache  Right side of mouth  Started about two weeks ago  No fever/no chills  Patient requesting penicillin to be sen to pharmacy  Please advise.

## 2020-02-17 ENCOUNTER — PATIENT MESSAGE (OUTPATIENT)
Dept: FAMILY MEDICINE CLINIC | Facility: CLINIC | Age: 50
End: 2020-02-17

## 2020-02-17 NOTE — TELEPHONE ENCOUNTER
Seeing neuro    Future Appointments   Date Time Provider Brian Shelia   2/26/2020  9:20 AM Stephen Holcomb MD [address] [place] [address]   4/10/2020 10:20 AM Viviana Garrido DO [facility]

## 2020-02-17 NOTE — TELEPHONE ENCOUNTER
From: Juhi Humphrey  To: Caro Falk MD  Sent: 2/17/2020 10:33 AM CST  Subject: Other    Made appt 2/24 9:20 am [facility]

## 2020-02-21 ENCOUNTER — PATIENT MESSAGE (OUTPATIENT)
Dept: FAMILY MEDICINE CLINIC | Facility: CLINIC | Age: 50
End: 2020-02-21

## 2020-02-21 NOTE — TELEPHONE ENCOUNTER
From: Dara Alicea  To: Jo Downey MD  Sent: 2/21/2020 12:11 PM CST  Subject: Other    I thought that I was able to get blood work done at your office ordering yunior

## 2020-02-24 ENCOUNTER — LAB ENCOUNTER (OUTPATIENT)
Dept: LAB | Age: 50
End: 2020-02-24
Attending: INTERNAL MEDICINE
Payer: COMMERCIAL

## 2020-02-24 DIAGNOSIS — R82.90 ABNORMAL URINALYSIS: ICD-10-CM

## 2020-02-24 DIAGNOSIS — R74.8 LIVER ENZYME ELEVATION: ICD-10-CM

## 2020-02-24 LAB
HAV IGM SER QL: NONREACTIVE
HBV CORE IGM SER QL: NONREACTIVE
HBV SURFACE AG SERPL QL IA: NONREACTIVE
HCV AB SERPL QL IA: NONREACTIVE
PROT UR-MCNC: 98.1 MG/DL

## 2020-02-24 PROCEDURE — 80074 ACUTE HEPATITIS PANEL: CPT

## 2020-02-24 PROCEDURE — 84156 ASSAY OF PROTEIN URINE: CPT

## 2020-02-24 PROCEDURE — 86480 TB TEST CELL IMMUN MEASURE: CPT

## 2020-02-25 ENCOUNTER — TELEPHONE (OUTPATIENT)
Dept: FAMILY MEDICINE CLINIC | Facility: CLINIC | Age: 50
End: 2020-02-25

## 2020-02-25 DIAGNOSIS — Z12.11 ENCOUNTER FOR SCREENING COLONOSCOPY: Primary | ICD-10-CM

## 2020-02-26 NOTE — TELEPHONE ENCOUNTER
Patient advised. Verbalized understanding. Referral placed and authorized. Patient will schedule.     Torito Samayoa [phone]

## 2020-02-26 NOTE — TELEPHONE ENCOUNTER
Pt should contact Dr. Sophia Hamilton office for BW results. He will need further evaluation. Recommend he schedule his screening colonoscopy. Put referral in for Dr. Rajiv Parekh. I will call him back if he has any questions.

## 2020-02-28 LAB
M TB TUBERC IFN-G BLD QL: NEGATIVE
M TB TUBERC IFN-G/MITOGEN IGNF BLD: 0.03 IU/ML
M TB TUBERC IFN-G/MITOGEN IGNF BLD: 0.12 IU/ML
M TB TUBERC IGNF/MITOGEN IGNF CONTROL: 9.96 IU/ML
MITOGEN IGNF BCKGRD COR BLD-ACNC: 0.02 IU/ML

## 2020-04-29 RX ORDER — TADALAFIL 10 MG/1
10 TABLET ORAL
Qty: 15 TABLET | Refills: 0 | Status: SHIPPED | OUTPATIENT
Start: 2020-04-29 | End: 2020-06-03

## 2020-04-29 NOTE — TELEPHONE ENCOUNTER
Last refilled on 3/9/20 for # 15 with 0 refills  Last OV 12/18/19 for physical   Future Appointments   Date Time Provider Brian Bass   5/4/2020  1:00 PM DO Maikol Ray   6/16/2020  1:40 PM DO OLMAN Renteria

## 2020-06-01 RX ORDER — AMLODIPINE BESYLATE 10 MG/1
TABLET ORAL
Qty: 90 TABLET | Refills: 0 | Status: SHIPPED | OUTPATIENT
Start: 2020-06-01 | End: 2020-06-03

## 2020-06-01 RX ORDER — LISINOPRIL 40 MG/1
TABLET ORAL
Qty: 90 TABLET | Refills: 0 | Status: SHIPPED | OUTPATIENT
Start: 2020-06-01 | End: 2020-06-03

## 2020-06-01 NOTE — TELEPHONE ENCOUNTER
Hypertension Medications Protocol Passed6/1 12:37 PM   CMP or BMP in past 12 months    Last serum creatinine< 2.0    Appointment in past 6 or next 3 months     Last refill -   Lisinopril - 2/2/18 - #90 with 3 refills  Amlodipine - 2/2/18 - #90 with 3 refil

## 2020-06-03 ENCOUNTER — OFFICE VISIT (OUTPATIENT)
Dept: FAMILY MEDICINE CLINIC | Facility: CLINIC | Age: 50
End: 2020-06-03

## 2020-06-03 VITALS
DIASTOLIC BLOOD PRESSURE: 84 MMHG | BODY MASS INDEX: 28.56 KG/M2 | HEIGHT: 71 IN | SYSTOLIC BLOOD PRESSURE: 130 MMHG | OXYGEN SATURATION: 98 % | WEIGHT: 204 LBS | TEMPERATURE: 97 F | HEART RATE: 102 BPM | RESPIRATION RATE: 18 BRPM

## 2020-06-03 DIAGNOSIS — I10 ESSENTIAL HYPERTENSION: ICD-10-CM

## 2020-06-03 DIAGNOSIS — N52.9 ERECTILE DYSFUNCTION, UNSPECIFIED ERECTILE DYSFUNCTION TYPE: ICD-10-CM

## 2020-06-03 DIAGNOSIS — Z51.81 THERAPEUTIC DRUG MONITORING: ICD-10-CM

## 2020-06-03 DIAGNOSIS — E78.00 PURE HYPERCHOLESTEROLEMIA: Primary | ICD-10-CM

## 2020-06-03 PROCEDURE — 80061 LIPID PANEL: CPT | Performed by: FAMILY MEDICINE

## 2020-06-03 PROCEDURE — 99214 OFFICE O/P EST MOD 30 MIN: CPT | Performed by: FAMILY MEDICINE

## 2020-06-03 RX ORDER — AMLODIPINE BESYLATE 10 MG/1
TABLET ORAL
Qty: 90 TABLET | Refills: 1 | Status: SHIPPED | OUTPATIENT
Start: 2020-06-03 | End: 2020-11-06 | Stop reason: DRUGHIGH

## 2020-06-03 RX ORDER — LISINOPRIL 40 MG/1
TABLET ORAL
Qty: 90 TABLET | Refills: 1 | Status: SHIPPED | OUTPATIENT
Start: 2020-06-03 | End: 2020-11-30

## 2020-06-03 RX ORDER — TADALAFIL 10 MG/1
10 TABLET ORAL
Qty: 15 TABLET | Refills: 1 | Status: SHIPPED | OUTPATIENT
Start: 2020-06-03 | End: 2020-07-01

## 2020-06-03 NOTE — PROGRESS NOTES
Sandro Jenkins is a 52year old male who presents for a medication check. HPI:     F/u on HTN. Taking med daily. Home -130-80s  No med side effects. F/u Hyperlipidemia. Controlling with diet.   Statins not started until cause of elev CK diagnos TAKE 1 TABLET BY MOUTH EVERY DAY 90 tablet 0   • lisinopril 40 MG Oral Tab TAKE 1 TABLET BY MOUTH EVERY DAY 90 tablet 0   • Tadalafil 10 MG Oral Tab Take 1 tablet (10 mg total) by mouth daily as needed for Erectile Dysfunction.  15 tablet 0      Past Medica

## 2020-06-04 ENCOUNTER — TELEPHONE (OUTPATIENT)
Dept: FAMILY MEDICINE CLINIC | Facility: CLINIC | Age: 50
End: 2020-06-04

## 2020-06-04 NOTE — TELEPHONE ENCOUNTER
----- Message from Shaina Velasquez MD sent at 6/4/2020  8:54 AM CDT -----  Lipids panel has worsened. Tchol, trig and LDL all higher.   Needs to be started on chol med once cause for elev CK levels w/u by specialist.  Jaron Joshua results to his cardiologist.  Karely Smallwood

## 2020-06-10 ENCOUNTER — PATIENT MESSAGE (OUTPATIENT)
Dept: FAMILY MEDICINE CLINIC | Facility: CLINIC | Age: 50
End: 2020-06-10

## 2020-06-10 RX ORDER — DIAZEPAM 5 MG/1
TABLET ORAL
Qty: 2 TABLET | Refills: 0 | Status: SHIPPED | OUTPATIENT
Start: 2020-06-10 | End: 2020-11-16 | Stop reason: ALTCHOICE

## 2020-06-10 NOTE — TELEPHONE ENCOUNTER
From: Trish Maria  To: Az Tamayo MD  Sent: 6/10/2020 8:48 AM CDT  Subject: Prescription Question    My MRI appt is scheduled for Monday At 8:00 pm in [facility] !  If possible could you please send the prescription for the relaxer to the pharmacy

## 2020-06-15 ENCOUNTER — HOSPITAL ENCOUNTER (OUTPATIENT)
Dept: MRI IMAGING | Age: 50
Discharge: HOME | End: 2020-06-15
Attending: Other
Payer: COMMERCIAL

## 2020-06-15 DIAGNOSIS — R74.8 ELEVATED CK: ICD-10-CM

## 2020-06-15 PROCEDURE — 73720 MRI LWR EXTREMITY W/O&W/DYE: CPT | Performed by: OTHER

## 2020-06-15 PROCEDURE — A9575 INJ GADOTERATE MEGLUMI 0.1ML: HCPCS | Performed by: OTHER

## 2020-07-02 RX ORDER — TADALAFIL 10 MG/1
10 TABLET ORAL
Qty: 15 TABLET | Refills: 1 | Status: SHIPPED | OUTPATIENT
Start: 2020-07-02 | End: 2020-08-11

## 2020-08-11 NOTE — TELEPHONE ENCOUNTER
Last refilled on 07/02/2020  for # 15  with 1 rf. Last seen on 06/03/2020 cc: Well adult. /84. No future appointments. Thank you.

## 2020-08-12 RX ORDER — TADALAFIL 10 MG/1
10 TABLET ORAL
Qty: 15 TABLET | Refills: 1 | Status: ON HOLD | OUTPATIENT
Start: 2020-08-12 | End: 2020-09-06

## 2020-09-04 ENCOUNTER — PATIENT MESSAGE (OUTPATIENT)
Dept: FAMILY MEDICINE CLINIC | Facility: CLINIC | Age: 50
End: 2020-09-04

## 2020-09-04 NOTE — TELEPHONE ENCOUNTER
From: Brenda Unger  To: Amaya Palacio MD  Sent: 9/4/2020 1:51 PM CDT  Subject: Non-Urgent Medical Question    Hi dr Anoop Stacy ! I'm haveing abdominal pain and I think it's due to constipation!  I haven't had a bowel movement in four days and now it's start

## 2020-09-04 NOTE — TELEPHONE ENCOUNTER
Passing gas? He can try miralax and an enema. If any nausea, vomiting, chest pain, SOB - go to ER. Indigestion can be sign of heart issues. With his hx of high chol and BP, he needs to monitor sx closely.

## 2020-09-06 ENCOUNTER — OFFICE VISIT (OUTPATIENT)
Dept: FAMILY MEDICINE CLINIC | Facility: CLINIC | Age: 50
End: 2020-09-06

## 2020-09-06 ENCOUNTER — APPOINTMENT (OUTPATIENT)
Dept: CT IMAGING | Age: 50
DRG: 375 | End: 2020-09-06
Attending: EMERGENCY MEDICINE
Payer: COMMERCIAL

## 2020-09-06 ENCOUNTER — HOSPITAL ENCOUNTER (INPATIENT)
Facility: HOSPITAL | Age: 50
LOS: 4 days | Discharge: HOME | DRG: 375 | End: 2020-09-10
Attending: EMERGENCY MEDICINE | Admitting: INTERNAL MEDICINE
Payer: COMMERCIAL

## 2020-09-06 VITALS
TEMPERATURE: 97 F | SYSTOLIC BLOOD PRESSURE: 128 MMHG | HEIGHT: 71 IN | RESPIRATION RATE: 18 BRPM | DIASTOLIC BLOOD PRESSURE: 80 MMHG | WEIGHT: 203 LBS | BODY MASS INDEX: 28.42 KG/M2 | OXYGEN SATURATION: 98 % | HEART RATE: 110 BPM

## 2020-09-06 DIAGNOSIS — R10.9 ABDOMINAL PAIN, UNSPECIFIED ABDOMINAL LOCATION: Primary | ICD-10-CM

## 2020-09-06 DIAGNOSIS — R80.9 PROTEINURIA, UNSPECIFIED TYPE: ICD-10-CM

## 2020-09-06 DIAGNOSIS — R19.00 INTRAABDOMINAL MASS: ICD-10-CM

## 2020-09-06 DIAGNOSIS — K56.609 SBO (SMALL BOWEL OBSTRUCTION) (HCC): Primary | ICD-10-CM

## 2020-09-06 DIAGNOSIS — K59.00 CONSTIPATION, UNSPECIFIED CONSTIPATION TYPE: ICD-10-CM

## 2020-09-06 PROBLEM — E87.1 HYPONATREMIA: Status: ACTIVE | Noted: 2020-09-06

## 2020-09-06 PROBLEM — R73.9 HYPERGLYCEMIA: Status: ACTIVE | Noted: 2020-09-06

## 2020-09-06 PROCEDURE — 3074F SYST BP LT 130 MM HG: CPT | Performed by: PHYSICIAN ASSISTANT

## 2020-09-06 PROCEDURE — 74177 CT ABD & PELVIS W/CONTRAST: CPT | Performed by: EMERGENCY MEDICINE

## 2020-09-06 PROCEDURE — 3079F DIAST BP 80-89 MM HG: CPT | Performed by: PHYSICIAN ASSISTANT

## 2020-09-06 PROCEDURE — 3008F BODY MASS INDEX DOCD: CPT | Performed by: PHYSICIAN ASSISTANT

## 2020-09-06 PROCEDURE — 99223 1ST HOSP IP/OBS HIGH 75: CPT | Performed by: COLON & RECTAL SURGERY

## 2020-09-06 PROCEDURE — 99213 OFFICE O/P EST LOW 20 MIN: CPT | Performed by: PHYSICIAN ASSISTANT

## 2020-09-06 RX ORDER — MORPHINE SULFATE 2 MG/ML
4 INJECTION, SOLUTION INTRAMUSCULAR; INTRAVENOUS EVERY 2 HOUR PRN
Status: DISCONTINUED | OUTPATIENT
Start: 2020-09-06 | End: 2020-09-10

## 2020-09-06 RX ORDER — ONDANSETRON 2 MG/ML
4 INJECTION INTRAMUSCULAR; INTRAVENOUS EVERY 6 HOURS PRN
Status: DISCONTINUED | OUTPATIENT
Start: 2020-09-06 | End: 2020-09-10

## 2020-09-06 RX ORDER — HYDRALAZINE HYDROCHLORIDE 20 MG/ML
5 INJECTION INTRAMUSCULAR; INTRAVENOUS EVERY 6 HOURS PRN
Status: DISCONTINUED | OUTPATIENT
Start: 2020-09-06 | End: 2020-09-08

## 2020-09-06 RX ORDER — ACETAMINOPHEN 325 MG/1
650 TABLET ORAL EVERY 6 HOURS PRN
Status: DISCONTINUED | OUTPATIENT
Start: 2020-09-06 | End: 2020-09-10

## 2020-09-06 RX ORDER — MORPHINE SULFATE 2 MG/ML
1 INJECTION, SOLUTION INTRAMUSCULAR; INTRAVENOUS EVERY 2 HOUR PRN
Status: DISCONTINUED | OUTPATIENT
Start: 2020-09-06 | End: 2020-09-10

## 2020-09-06 RX ORDER — BISACODYL 10 MG
10 SUPPOSITORY, RECTAL RECTAL
Status: DISCONTINUED | OUTPATIENT
Start: 2020-09-06 | End: 2020-09-10

## 2020-09-06 RX ORDER — METOCLOPRAMIDE HYDROCHLORIDE 5 MG/ML
10 INJECTION INTRAMUSCULAR; INTRAVENOUS EVERY 8 HOURS PRN
Status: DISCONTINUED | OUTPATIENT
Start: 2020-09-06 | End: 2020-09-10

## 2020-09-06 RX ORDER — FAMOTIDINE 10 MG/ML
20 INJECTION, SOLUTION INTRAVENOUS 2 TIMES DAILY
Status: DISCONTINUED | OUTPATIENT
Start: 2020-09-06 | End: 2020-09-10

## 2020-09-06 RX ORDER — SODIUM CHLORIDE 9 MG/ML
INJECTION, SOLUTION INTRAVENOUS CONTINUOUS
Status: DISCONTINUED | OUTPATIENT
Start: 2020-09-06 | End: 2020-09-10

## 2020-09-06 RX ORDER — MORPHINE SULFATE 2 MG/ML
2 INJECTION, SOLUTION INTRAMUSCULAR; INTRAVENOUS EVERY 2 HOUR PRN
Status: DISCONTINUED | OUTPATIENT
Start: 2020-09-06 | End: 2020-09-10

## 2020-09-06 RX ORDER — ENOXAPARIN SODIUM 100 MG/ML
40 INJECTION SUBCUTANEOUS DAILY
Status: DISCONTINUED | OUTPATIENT
Start: 2020-09-06 | End: 2020-09-10

## 2020-09-06 NOTE — PROGRESS NOTES
CHIEF COMPLAINT:     Patient presents with:  Constipation: s/s for 4 day.  little improvement with OTC meds  Vomitin days prior      HPI:   Chelita Javier is a 52year old male who presents with complaints of abdominal pain and constipation for the p Denies chest pain, or palpitations  LUNGS: Denies shortness of breath, cough, or wheezing  GI: See HPI  MUSCULOSKELETAL: no arthralgia or swollen joints  LYMPH:  Denies lymphadenopathy  NEURO: Denies headaches or lightheadedness      EXAM:   /80   Pu

## 2020-09-06 NOTE — PLAN OF CARE
Pt alert and oriented x4. Denies need for pain meds. VSS. Denies nausea. NPO with ice chips. Denies gas. Last BM 5 days ago. IV fluids. Up ad adria. Plan of care discussed with pt. All questions and concerns addressed. Will continue to monitor.      Problem: pre-medicate as appropriate  Outcome: Progressing

## 2020-09-06 NOTE — H&P
[facility]  History and Physical     Jennifer North Patient Status:  Inpatient    1970 MRN [id number]   [facility] 3NW-A Attending Judy Ames, DO   Hosp Day # 0 PCP Peter Wharton MD     Chief Complaint: Abdominal pain noted in the HPI. Physical Exam:    BP (!) 149/94   Pulse 104   Temp 99.1 °F (37.3 °C) (Temporal)   Resp 20   Ht 5' 11\" (1.803 m)   Wt 205 lb (93 kg)   SpO2 99%   BMI 28.59 kg/m²   General: No acute distress. Alert and oriented x 3.   HEENT: Normocephal

## 2020-09-06 NOTE — ED NOTES
Pt notified of room # for admission to hospital. Answered all questions. Pt verbalized understanding of admission to hospital. [id number] [address] per Dr. Fritz Bolaños for pt to drive himself over to [facility] for admission. Wrapped pt's IV with coband.  Instructed pt to d

## 2020-09-06 NOTE — CONSULTS
[facility]  Report of Consultation    Mecca Maldonado Patient Status:  Inpatient    1970 MRN [id number]   [facility] 3NW-A Attending Ginger Phillips MD   Hosp Day # 0 PCP Edna Walton MD     Reason for Consultation:  ZGIYVODGR Diabetes Mother    • Diabetes Brother    • Cancer Neg    • Lipids Neg    • Heart Disorder Neg    • Stroke Neg       reports that he has never smoked. He has never used smokeless tobacco. He reports that he does not drink alcohol or use drugs.     Allergies: Negative for cough, dyspnea and wheezing    Physical Exam:  Blood pressure (!) 149/94, pulse 104, temperature 99.1 °F (37.3 °C), temperature source Temporal, resp. rate 20, height 71\", weight 205 lb (93 kg), SpO2 99 %. General: Alert, orientated x3.   C includes the Dose Index Registry. PATIENT STATED HISTORY:(As transcribed by Technologist)  The patient has left lower quadrant abdominal pain, vomiting and constipation for five days.       CONTRAST USED:  100cc of Omnipaque 350     FINDINGS:    LIVER: mass.  Pelvic organs appropriate for patient age. BONES:  No acute osseous abnormalities. No lytic, blastic or destructive osseous changes. Extensive lumbar disc disease at L5-S1 with vacuum disc change.   LUNG BASES:  No visible pulmonary or pleural d have reviewed the above note and evaluation by the physician assistant. I agree with her physical exam and the data listed in the report, and I have made any relevant changes in editing her note.  I have personally examined the patient and reviewed all rele morning  -Consult has been placed to surgical oncology for further recommendations on the retroperitoneal mass. My total face time with this patient was 60 minutes.   Greater than half of our visit was spent in counseling the patient on the above listed

## 2020-09-06 NOTE — PROGRESS NOTES
NURSING ADMISSION NOTE      Patient admitted via Wheelchair  Oriented to room. Safety precautions initiated. Bed in low position. Call light in reach. Pt arrived to room 333. Covid swab brought to lab. MDs paged regarding pt arriving.  Rating pain

## 2020-09-06 NOTE — ED PROVIDER NOTES
Patient Seen in: THE [facility] Emergency Department In [place]      History   Patient presents with:  Abdominal Pain    Stated Complaint: constipation x 5 days, vomiting today    HPI    This is a 56-year-old man history of hypertension, here with complaints breath sounds. No wheezing, rhonchi or rales. Abdominal: Soft mildly distended, there is tympany to percussion in the upper abdomen, no focal tenderness no guarding no rebound tenderness.     Skin: Warm and dry  Neurological: Awake alert, speech is normal Registry) which includes the Dose Index Registry. PATIENT STATED HISTORY:(As transcribed by Technologist)  The patient has left lower quadrant abdominal pain, vomiting and constipation for five days.    CONTRAST USED:  100cc of Omnipaque 350  FINDINGS:  LI appropriate for patient age. BONES:  No acute osseous abnormalities. No lytic, blastic or destructive osseous changes. Extensive lumbar disc disease at L5-S1 with vacuum disc change. LUNG BASES:  No visible pulmonary or pleural disease.              CONC 9/6/2020          ICD-10-CM Noted POA    Hyperglycemia R73.9 9/6/2020 Yes    Hyponatremia E87.1 9/6/2020 Yes    SBO (small bowel obstruction) ([facility] [address] K56.609 9/6/2020 Unknown

## 2020-09-07 ENCOUNTER — PATIENT MESSAGE (OUTPATIENT)
Dept: FAMILY MEDICINE CLINIC | Facility: CLINIC | Age: 50
End: 2020-09-07

## 2020-09-07 ENCOUNTER — APPOINTMENT (OUTPATIENT)
Dept: GENERAL RADIOLOGY | Facility: HOSPITAL | Age: 50
DRG: 375 | End: 2020-09-07
Attending: COLON & RECTAL SURGERY
Payer: COMMERCIAL

## 2020-09-07 PROCEDURE — 99232 SBSQ HOSP IP/OBS MODERATE 35: CPT | Performed by: COLON & RECTAL SURGERY

## 2020-09-07 PROCEDURE — 99232 SBSQ HOSP IP/OBS MODERATE 35: CPT | Performed by: STUDENT IN AN ORGANIZED HEALTH CARE EDUCATION/TRAINING PROGRAM

## 2020-09-07 PROCEDURE — 74018 RADEX ABDOMEN 1 VIEW: CPT | Performed by: COLON & RECTAL SURGERY

## 2020-09-07 NOTE — PROGRESS NOTES
[facility]  Progress Note     Skyler Pollard Patient Status:  Inpatient    1970 MRN [id number]   [facility] 3NW-A Attending Shad Cornejo MD   Hosp Day # 1 PCP Jarrod Vasquez MD     Chief Complaint: Constipation, abdominal mg Subcutaneous Daily   • famoTIDine  20 mg Intravenous BID       ASSESSMENT / PLAN:     1. SBO due to ? Carcinoid  1. Sx on Cs  2. Surg Onc eval  3. IVF  4. NPO  5. Anti emetics  2.  HTN  1. IV meds PRN    Plan of care:   Conservative mgt for SBO  SUrg Onc

## 2020-09-07 NOTE — CONSULTS
[facility]    Patient Name:  Janine Fields   YOB: 1970   Gender:  Male   Appt Date:  9/6/2020   Provider:  No name on file.    Insurance:  [address] [place] [address]  Refe Vital Signs:  /90 (BP Location: Left arm)   Pulse (!) 122   Temp 98.3 °F (36.8 °C) (Oral)   Resp 18   Ht 1.803 m (5' 11\")   Wt 93 kg (205 lb)   SpO2 100%   BMI 28.59 kg/m²      Medications Reviewed:  No current outpatient medications on file.      Al Genitourinary: Negative for dysuria and difficulty urinating. Musculoskeletal: Negative for myalgias. Skin: Negative for color change and pallor. Allergic/Immunologic: Negative for immunocompromised state.    Neurological: Negative for syncope and wea PATIENT STATED HISTORY:(As transcribed by Technologist)  The patient has left lower quadrant abdominal pain, vomiting and constipation for five days.       CONTRAST USED:  100cc of Omnipaque 350     FINDINGS:    LIVER:  No enlargement, atrophy, abnormal den PELVIC ORGANS:  No visible mass. Pelvic organs appropriate for patient age. BONES:  No acute osseous abnormalities. No lytic, blastic or destructive osseous changes. Extensive lumbar disc disease at L5-S1 with vacuum disc change.   LUNG BASES:  No vis RBC Latest Ref Range: 4.30 - 5.70 x10(6)uL 4.76   MCH Latest Ref Range: 26.0 - 34.0 pg 29.4   MCHC Latest Ref Range: 31.0 - 37.0 g/dL 33.8   MCV Latest Ref Range: 80.0 - 100.0 fL 87.0   RDW Latest Ref Range: 11.0 - 15.0 % 11.8   RDW-SD Latest Ref Range: 35 At this point, I discussed the case with Dr. Marygrace Barthel. We will plan on having gastroenterology evaluate the patient for possible EUS and biopsy. The mass is within close proximity to the second/third portion of the duodenum, hopefully a biopsy as possible.

## 2020-09-07 NOTE — H&P (VIEW-ONLY)
[facility]                       Gastroenterology Consultation-[facility]    Debby Severin Patient Status:  Inpatient    1970 MRN [id number]   [facility] 3NW-A Attending Ellen Zapata MD   [facility] Or  morphINE sulfate (PF) 2 MG/ML injection 4 mg, 4 mg, Intravenous, Q2H PRN  hydrALAzine HCl (APRESOLINE) injection 5 mg, 5 mg, Intravenous, Q6H PRN  bisacodyl (DULCOLAX) rectal suppository 10 mg, 10 mg, Rectal, Daily PRN  famoTIDine (PEPCID) injection 20 (Oral)   Resp 18   Ht 71\"   Wt 205 lb (93 kg)   SpO2 100%   BMI 28.59 kg/m²     Gen: AAO x 3, NAD     HENT: NCAT, EOMI, PERRL, oropharynx is clear with moist mucosal membranes    Eyes: Sclerae are anicteric    Neck:  Supple without nuchal rigidity;  No lym left of this mass with small bowel dilatation proximally up to 4.7 cm.  3. Please see further details above.       Assessment and Plan:     -Acute SBO secondary to RP mass; we are consulted in order to see if this lesion would be amenable to endoscopic ultr

## 2020-09-07 NOTE — PROGRESS NOTES
[facility]  Progress Note    Flora Salter Patient Status:  Inpatient    1970 MRN [id number]   [facility] 3NW-A Attending Ghada Cerda MD   Hosp Day # 1 PCP Luiz Lopez MD     Subjective:   The patient is sitting in chair Patient offered no additional history at this time. FINDINGS:    Large amount of stool in ascending colon. Gaseous distention of small bowel loops throughout the abdomen is similar in appearance to CT scan of 9/6/2020.   There is also similar gase I have personally examined the patient and reviewed all relevant labs and reports. I agree with the above assessment and plan and again have modified the report to reflect my opinion. Patient complaining of abdominal fullness and pressure.   Passing flat

## 2020-09-07 NOTE — CONSULTS
[facility]                       Gastroenterology Consultation-[facility]    Areatha Means Patient Status:  Inpatient    1970 MRN [id number]   [facility] 3NW-A Attending Helder Leigh MD   [facility] Or  morphINE sulfate (PF) 2 MG/ML injection 4 mg, 4 mg, Intravenous, Q2H PRN  hydrALAzine HCl (APRESOLINE) injection 5 mg, 5 mg, Intravenous, Q6H PRN  bisacodyl (DULCOLAX) rectal suppository 10 mg, 10 mg, Rectal, Daily PRN  famoTIDine (PEPCID) injection 20 (Oral)   Resp 18   Ht 71\"   Wt 205 lb (93 kg)   SpO2 100%   BMI 28.59 kg/m²     Gen: AAO x 3, NAD     HENT: NCAT, EOMI, PERRL, oropharynx is clear with moist mucosal membranes    Eyes: Sclerae are anicteric    Neck:  Supple without nuchal rigidity;  No lym left of this mass with small bowel dilatation proximally up to 4.7 cm.  3. Please see further details above.       Assessment and Plan:     -Acute SBO secondary to RP mass; we are consulted in order to see if this lesion would be amenable to endoscopic ultr

## 2020-09-08 ENCOUNTER — ANESTHESIA EVENT (OUTPATIENT)
Dept: ENDOSCOPY | Facility: HOSPITAL | Age: 50
DRG: 375 | End: 2020-09-08
Payer: COMMERCIAL

## 2020-09-08 PROCEDURE — 99232 SBSQ HOSP IP/OBS MODERATE 35: CPT | Performed by: COLON & RECTAL SURGERY

## 2020-09-08 PROCEDURE — 3008F BODY MASS INDEX DOCD: CPT | Performed by: STUDENT IN AN ORGANIZED HEALTH CARE EDUCATION/TRAINING PROGRAM

## 2020-09-08 PROCEDURE — 3077F SYST BP >= 140 MM HG: CPT | Performed by: STUDENT IN AN ORGANIZED HEALTH CARE EDUCATION/TRAINING PROGRAM

## 2020-09-08 PROCEDURE — 99232 SBSQ HOSP IP/OBS MODERATE 35: CPT | Performed by: STUDENT IN AN ORGANIZED HEALTH CARE EDUCATION/TRAINING PROGRAM

## 2020-09-08 PROCEDURE — 3079F DIAST BP 80-89 MM HG: CPT | Performed by: STUDENT IN AN ORGANIZED HEALTH CARE EDUCATION/TRAINING PROGRAM

## 2020-09-08 RX ORDER — HYDRALAZINE HYDROCHLORIDE 20 MG/ML
10 INJECTION INTRAMUSCULAR; INTRAVENOUS EVERY 6 HOURS PRN
Status: DISCONTINUED | OUTPATIENT
Start: 2020-09-08 | End: 2020-09-10

## 2020-09-08 RX ORDER — LABETALOL HYDROCHLORIDE 5 MG/ML
20 INJECTION, SOLUTION INTRAVENOUS ONCE
Status: COMPLETED | OUTPATIENT
Start: 2020-09-08 | End: 2020-09-08

## 2020-09-08 RX ORDER — HYDRALAZINE HYDROCHLORIDE 20 MG/ML
10 INJECTION INTRAMUSCULAR; INTRAVENOUS ONCE
Status: COMPLETED | OUTPATIENT
Start: 2020-09-08 | End: 2020-09-08

## 2020-09-08 NOTE — ANESTHESIA PREPROCEDURE EVALUATION
PRE-OP EVALUATION    Patient Name: Samson Jones    Pre-op Diagnosis: inpt    Procedure(s):  endoscopic ultrasound    Surgeon(s) and Role:     * Warner Garvin, DO - Primary    Pre-op vitals reviewed.   Temp: 97.7 °F (36.5 °C)  Pulse: 97  Resp: 18  BP Cardiovascular        Exercise tolerance: good     MET: >4      (+) hypertension   (+) hyperlipidemia                                  Endo/Other    Negative endo/other ROS. Pulmonary    Negative pulmonary ROS.

## 2020-09-08 NOTE — TELEPHONE ENCOUNTER
From: Janey Felty  To: Ayla Gardner MD  Sent: 9/7/2020 11:06 AM CDT  Subject: Non-Urgent Medical Question    I was admitted to [facility] possible neuroendocrine tumor! That can't be detected through blood work!  Better to explain on phn

## 2020-09-08 NOTE — TELEPHONE ENCOUNTER
Spoke to patient. Patient states he already talked to Dr. Grey Cordero yesterday. No further questions.

## 2020-09-08 NOTE — PLAN OF CARE
Pt alert and oriented x4. Denies need for pain medication. Denies gas. Denies nausea. NPO with ice chips. Pt asking for food. RA. VSS. IV fluids per order. Pt up ad adria. Plan of care discussed with pt. All questions and concerns addressed.  Will continue to pre-medicate as appropriate  Outcome: Progressing

## 2020-09-08 NOTE — PROGRESS NOTES
[facility]  Progress Note    Jennifer Can Patient Status:  Inpatient    1970 MRN [id number]   [facility] 3NW-A Attending Emma Olsen MD   Hosp Day # 2 PCP Peter Wharton MD     Subjective:   The patient denies new complaint GI  3. Continue supportive care. My total face time with this patient was 22 minutes. Greater than half of our visit was spent in counseling the patient on the above listed diagnoses and treatment options.     Chapincito Laws  9/8/2020  9:07 AM    Addendum

## 2020-09-08 NOTE — PLAN OF CARE
PT RESTING COMFORTABLE IN BED, HIGH BPS OVER NIGHT, OTHER VSS, AFEBRILE. MD AWARE OF HIGH BPS; SEE ORDERS. BP STABLE THIS AM. PT TOLERATING NPO DIET WELL, NO N/V. PT DENIES ALL PAIN, SOB, JAY. PT AMBULATING IN ROOM, VOIDING IN BATHROOM, STEADY GAIT.  PT REP barriers to adequate nutritional intake and initiate nutrition consult as needed  - Instruct patient on self management of diabetes  Outcome: Progressing

## 2020-09-08 NOTE — PROGRESS NOTES
Gastroenterology Follow-Up Note      Chelita Javier Patient Status:  Inpatient    1970 MRN [id number]   [facility] 3NW-A Attending David Harris MD   Hosp Day # 2 PCP Daja Corea MD     Chief Complaint/Reason for Follow Up clears today then NPO at mn for procedure     Constipation  -do not recommend colonoscopy at this chiki as patient unlikely to tolerate volume of prep, can plan as outpatient  -laxatives as outpatient      JOSE Garvin  Gastroenterology/Advanced Endoscopy  [place]

## 2020-09-08 NOTE — PROGRESS NOTES
Patient feels much better today. Passing flatus. He reports similar episode of pain about 1 year ago with constipation relieved with laxatives. Blood pressure 147/87, pulse 108, temperature 98.2 °F (36.8 °C), temperature source Oral, resp.  rate 18, he

## 2020-09-08 NOTE — PROGRESS NOTES
[facility]  Progress Note     Lucia Dodge Patient Status:  Inpatient    1970 MRN [id number]   [facility] 3NW-A Attending Jose Alberto Nur MD   Hosp Day # 2 PCP Sravan Fatima MD     Chief Complaint: Constipation, abdominal last 168 hours. Imaging: Imaging data reviewed in Epic. Medications:   • enoxaparin  40 mg Subcutaneous Daily   • famoTIDine  20 mg Intravenous BID       ASSESSMENT / PLAN:     1. SBO due to ? Carcinoid  1. Sx on Cs  2. Surg Onc eval  3. IVF  4.

## 2020-09-09 ENCOUNTER — ANESTHESIA (OUTPATIENT)
Dept: ENDOSCOPY | Facility: HOSPITAL | Age: 50
DRG: 375 | End: 2020-09-09
Payer: COMMERCIAL

## 2020-09-09 PROCEDURE — BW41ZZZ ULTRASONOGRAPHY OF ABDOMEN AND PELVIS: ICD-10-PCS | Performed by: INTERNAL MEDICINE

## 2020-09-09 PROCEDURE — 0WBH4ZX EXCISION OF RETROPERITONEUM, PERCUTANEOUS ENDOSCOPIC APPROACH, DIAGNOSTIC: ICD-10-PCS | Performed by: INTERNAL MEDICINE

## 2020-09-09 PROCEDURE — 99232 SBSQ HOSP IP/OBS MODERATE 35: CPT | Performed by: COLON & RECTAL SURGERY

## 2020-09-09 PROCEDURE — 99232 SBSQ HOSP IP/OBS MODERATE 35: CPT | Performed by: HOSPITALIST

## 2020-09-09 RX ORDER — LIDOCAINE HYDROCHLORIDE 10 MG/ML
INJECTION, SOLUTION EPIDURAL; INFILTRATION; INTRACAUDAL; PERINEURAL AS NEEDED
Status: DISCONTINUED | OUTPATIENT
Start: 2020-09-09 | End: 2020-09-09 | Stop reason: SURG

## 2020-09-09 RX ORDER — AMLODIPINE BESYLATE 5 MG/1
10 TABLET ORAL DAILY
Status: DISCONTINUED | OUTPATIENT
Start: 2020-09-09 | End: 2020-09-10

## 2020-09-09 RX ORDER — LABETALOL HYDROCHLORIDE 5 MG/ML
INJECTION, SOLUTION INTRAVENOUS AS NEEDED
Status: DISCONTINUED | OUTPATIENT
Start: 2020-09-09 | End: 2020-09-09 | Stop reason: SURG

## 2020-09-09 RX ORDER — SODIUM CHLORIDE, SODIUM LACTATE, POTASSIUM CHLORIDE, CALCIUM CHLORIDE 600; 310; 30; 20 MG/100ML; MG/100ML; MG/100ML; MG/100ML
INJECTION, SOLUTION INTRAVENOUS CONTINUOUS PRN
Status: DISCONTINUED | OUTPATIENT
Start: 2020-09-09 | End: 2020-09-09 | Stop reason: SURG

## 2020-09-09 RX ORDER — POLYETHYLENE GLYCOL 3350 17 G/17G
17 POWDER, FOR SOLUTION ORAL 2 TIMES DAILY
Status: DISCONTINUED | OUTPATIENT
Start: 2020-09-09 | End: 2020-09-10

## 2020-09-09 RX ADMIN — LABETALOL HYDROCHLORIDE 10 MG: 5 INJECTION, SOLUTION INTRAVENOUS at 12:14:00

## 2020-09-09 RX ADMIN — SODIUM CHLORIDE, SODIUM LACTATE, POTASSIUM CHLORIDE, CALCIUM CHLORIDE: 600; 310; 30; 20 INJECTION, SOLUTION INTRAVENOUS at 12:32:00

## 2020-09-09 RX ADMIN — LIDOCAINE HYDROCHLORIDE 25 MG: 10 INJECTION, SOLUTION EPIDURAL; INFILTRATION; INTRACAUDAL; PERINEURAL at 11:52:00

## 2020-09-09 RX ADMIN — SODIUM CHLORIDE, SODIUM LACTATE, POTASSIUM CHLORIDE, CALCIUM CHLORIDE: 600; 310; 30; 20 INJECTION, SOLUTION INTRAVENOUS at 11:52:00

## 2020-09-09 NOTE — ANESTHESIA POSTPROCEDURE EVALUATION
[address] Patient Status:  Inpatient   Age/Gender 52year old male MRN [id number]   [facility] ENDOSCOPY Attending Denise GramajoCharlemontMERLYN [facility] Day # 3 PCP Caro Falk MD       Anesthesia Post-op Note    Proce

## 2020-09-09 NOTE — INTERVAL H&P NOTE
Pre-op Diagnosis: inpt    The above referenced H&P was reviewed by Michael French DO on 9/9/2020, the patient was examined and no significant changes have occurred in the patient's condition since the H&P was performed.   I discussed with the patient and/

## 2020-09-09 NOTE — OPERATIVE REPORT
Trey Stable Patient Status:  Inpatient    1970 MRN [id number]   [facility] ENDOSCOPY Attending Rosanne Landon, [address] Day # 3 PCP Hiral Stockton MD     PREOPERATIVE DIAGNOSIS/INDICATION: Retroperitoneal mass and s was 40 cm from the incisors and regular. Stomach: The gastric mucosa was normal including retroflexion views of fundus and cardia. Duodenum: Extrinsic compression of the 1st and 2nd portion of the duodenum was visualized without obstruction.  The wan

## 2020-09-09 NOTE — PROGRESS NOTES
PT RESTING IN RECLINER, EASY NON LABORED BREATHING ON RA. IV FLUIDS INFUSING. VOIDS ADEQUATE AMOUNT. PT TOLERATING CLEAR LIQUIDS. PASSING FLATUS, DENIES ANY NAUSEA. UP AD NAY. DENIES ANY NAUSEA. PM MEDS ADMIN. PLAN OF CARE DISCUSSED.  ALL QUESTIONS ANSWERED

## 2020-09-09 NOTE — PROGRESS NOTES
[facility]  Progress Note    Chelita Javier Patient Status:  Inpatient    1970 MRN [id number]   [facility] 3NW-A Attending David Harris MD   Hosp Day # 3 PCP Daja Corea MD     Subjective:   The patient is sitting up in his the above note and evaluation by the physician assistant. I agree with her physical exam and the data listed in the report, and I have made any relevant changes in editing her note.  I have personally examined the patient and reviewed all relevant labs and

## 2020-09-09 NOTE — PROGRESS NOTES
[facility]  Progress Note     Earl Samples Patient Status:  Inpatient    1970 MRN [id number]   [facility] 3NW-A Attending Jaosn Jain [address] Day # 3 PCP Teri Pfeiffer MD     Chief Complaint: abdominal pain Clearance: 91.6 mL/min (based on SCr of 1.05 mg/dL). No results for input(s): PTP, INR in the last 168 hours. No results for input(s): TROP, CK in the last 168 hours. Imaging: Imaging data reviewed in Epic.     Medications:   • PEG 3350  17 g

## 2020-09-09 NOTE — PLAN OF CARE
Problem: Patient/Family Goals  Goal: Patient/Family Long Term Goal  Description  Patient's Long Term Goal: DISCHARGE    Interventions:  - RETURN TO REGULAR ADL'S  -COMMUNICATE ANY NEEDS  -TOLERATE ADVANCED DIET  - See additional Care Plan goals for speci Assess for signs and symptoms of hyperglycemia and hypoglycemia  - Administer ordered medications to maintain glucose within target range  - Assess barriers to adequate nutritional intake and initiate nutrition consult as needed  - Instruct patient on self

## 2020-09-10 VITALS
OXYGEN SATURATION: 98 % | HEIGHT: 71 IN | BODY MASS INDEX: 28.7 KG/M2 | SYSTOLIC BLOOD PRESSURE: 147 MMHG | DIASTOLIC BLOOD PRESSURE: 82 MMHG | TEMPERATURE: 98 F | WEIGHT: 205 LBS | HEART RATE: 110 BPM | RESPIRATION RATE: 19 BRPM

## 2020-09-10 PROCEDURE — 99239 HOSP IP/OBS DSCHRG MGMT >30: CPT | Performed by: HOSPITALIST

## 2020-09-10 RX ORDER — POLYETHYLENE GLYCOL 3350 17 G/17G
17 POWDER, FOR SOLUTION ORAL 2 TIMES DAILY
Qty: 100 EACH | Refills: 3 | Status: SHIPPED | OUTPATIENT
Start: 2020-09-10 | End: 2020-10-10

## 2020-09-10 RX ORDER — LISINOPRIL 40 MG/1
40 TABLET ORAL DAILY
Status: DISCONTINUED | OUTPATIENT
Start: 2020-09-10 | End: 2020-09-10

## 2020-09-10 NOTE — PROGRESS NOTES
Patient feels well. Passing flatus. Tolerating full liquids     Blood pressure 147/82, pulse 110, temperature 98.5 °F (36.9 °C), temperature source Oral, resp. rate 19, height 1.803 m (5' 11\"), weight 93 kg (205 lb), SpO2 98 %.         Intake/Output Summ

## 2020-09-10 NOTE — PROGRESS NOTES
Patient discharged to home. Discharge instructions reviewed with the patient, and the patient verbalized his understanding of his discharge instructions. The patient ambulated out of the hospital with his girlfriend with all of his personal belongings.

## 2020-09-10 NOTE — PLAN OF CARE
A&O x4. Hx of HTN - prn hydralazine ordered. VSS & afebrile. Pt denies calf pain, chest pain & JAY. RA. Pt refusing SCDs - benefit of SCDs explained, but pt continues to refuse. Full liquid diet. Pt denies nausea at this time. Voids. Up ad adria.  Pt denies p social influences on pain and pain management  - Manage/alleviate anxiety  - Utilize distraction and/or relaxation techniques  - Monitor for opioid side effects  - Notify MD/LIP if interventions unsuccessful or patient reports new pain  - Anticipate increa

## 2020-09-10 NOTE — PROGRESS NOTES
[facility]  Progress Note     Miracle Leo Patient Status:  Inpatient    1970 MRN [id number]   [facility] 3NW-A Attending Yesica Naranjo [address] Day # 4 PCP Lotus Martinez MD     Chief Complaint: abdominal pain INR in the last 168 hours. No results for input(s): TROP, CK in the last 168 hours. Imaging: Imaging data reviewed in Epic.     Medications:   • PEG 3350  17 g Oral BID   • amLODIPine Besylate  10 mg Oral Daily   • enoxaparin  40 mg Subcutaneous

## 2020-09-10 NOTE — PROGRESS NOTES
[facility]  Progress Note    Roman Santiago Patient Status:  Inpatient    1970 MRN [id number]   [facility] 3NW-A Attending Esperanza Hernandez MD   Hosp Day # 4 PCP Paul Avila MD     Subjective:   The patient is up and ambulating

## 2020-09-11 ENCOUNTER — PATIENT OUTREACH (OUTPATIENT)
Dept: CASE MANAGEMENT | Age: 50
End: 2020-09-11

## 2020-09-11 DIAGNOSIS — Z02.9 ENCOUNTERS FOR UNSPECIFIED ADMINISTRATIVE PURPOSE: ICD-10-CM

## 2020-09-11 PROCEDURE — 1111F DSCHRG MED/CURRENT MED MERGE: CPT

## 2020-09-14 ENCOUNTER — TELEPHONE (OUTPATIENT)
Dept: FAMILY MEDICINE CLINIC | Facility: CLINIC | Age: 50
End: 2020-09-14

## 2020-09-14 ENCOUNTER — TELEPHONE (OUTPATIENT)
Dept: SURGERY | Facility: CLINIC | Age: 50
End: 2020-09-14

## 2020-09-14 NOTE — PROGRESS NOTES
Attempted to reach the patient to complete [facility] FU call. Left a message for the pt to call [facility] back at, [phone].

## 2020-09-14 NOTE — TELEPHONE ENCOUNTER
Spoke to pt for TCM today to follow up on the recent hospitalization and care for a small bowel obstruction and an intraabdominal mass. Pt does not have HFU appt scheduled at this time.   TCM/HFU appt recommended by 9/24/2020 as pt is a moderate risk for r

## 2020-09-14 NOTE — DISCHARGE SUMMARY
[facility]  DISCHARGE SUMMARY     Argelia Victor Patient Status:  Inpatient    1970 MRN [id number]   [facility] 3NW-A Attending No att. providers found   [facility] Day # 4 PCP Teresa Taylor MD     Date of Admission: 2020  Da Discharge Medications      START taking these medications      Instructions Prescription details   PEG 3350 17 g Pack  Commonly known as:  MIRALAX      Take 17 g by mouth 2 (two) times daily.    Stop taking on:  October 10, 2020  Quantity:  100 each  Refi edema.  -----------------------------------------------------------------------------------------------  PATIENT DISCHARGE INSTRUCTIONS: See electronic chart    Jung Garcia DO    Time spent:  > 30 minutes

## 2020-09-14 NOTE — TELEPHONE ENCOUNTER
Spoke with patient, He has been doing well. He has had bowel movements. Denies any new or worsening symptoms. Discussed that cytology is still pending and sent for outside review. We will plan follow-up visit with Dr. Shyla Rolle once results finalized.  Patient

## 2020-09-14 NOTE — PROGRESS NOTES
Initial Post Discharge Follow Up   Discharge Date: 9/10/20  Contact Date: 9/14/2020    Consent Verification:  Assessment Completed With: Patient  HIPAA Verified?   Yes    Discharge Dx:     Retroperitoneal mass  Small bowel obstruction  Hypertension    Wa Current Outpatient Medications   Medication Sig Dispense Refill   • PEG 3350 17 g Oral Powd Pack Take 17 g by mouth 2 (two) times daily. 100 each 3   • diazepam 5 MG Oral Tab Take 2 tabs 1 hour before MRI .  Make sure you have someone else drive you to an patient is waiting to schedule appointments with the recommended specialists (general surgery and GI) until after biopsy results are completed. The patient declined any further assistance with scheduling when offered by the [facility].       Is there any reason as

## 2020-09-16 NOTE — TELEPHONE ENCOUNTER
9/16/2020  2:52 PM Y Arielle Barakat RN Patient Medical Advice Request     FYI pt read my [employer] message. Has not scheduled yet.

## 2020-09-17 ENCOUNTER — TELEPHONE (OUTPATIENT)
Dept: SURGERY | Facility: CLINIC | Age: 50
End: 2020-09-17

## 2020-09-17 DIAGNOSIS — D44.7 PARAGANGLIOMA (HCC): Primary | ICD-10-CM

## 2020-09-17 DIAGNOSIS — D3A.8 NEUROENDOCRINE TUMOR: ICD-10-CM

## 2020-09-17 NOTE — TELEPHONE ENCOUNTER
Called patient and reviewed cytology results with him. PET/gallium ordered. Number for central scheduling given. We discussed that he will also need to meet with genetics.  Patient reports symptoms of palpitations and \"shakiness\" will obtain urine metanep

## 2020-09-18 ENCOUNTER — TELEPHONE (OUTPATIENT)
Dept: SURGERY | Facility: CLINIC | Age: 50
End: 2020-09-18

## 2020-09-18 NOTE — TELEPHONE ENCOUNTER
Called pt to inform him of 24 hour urine test ordered for him. Gave him instructions on how to collect it and where to  a jug for it.  Also touched base about his PET scan we have ordered to see when he will be scheduling it for so we can get him on

## 2020-09-23 NOTE — TELEPHONE ENCOUNTER
Called patient,offered appointment for hospital follow up, he stated he will check his schedule and call back. I offered Friday at 10:40,or Saturday at 0900.

## 2020-10-06 ENCOUNTER — TELEPHONE (OUTPATIENT)
Dept: SURGERY | Facility: CLINIC | Age: 50
End: 2020-10-06

## 2020-10-06 ENCOUNTER — HOSPITAL ENCOUNTER (OUTPATIENT)
Dept: NUCLEAR MEDICINE | Facility: HOSPITAL | Age: 50
Discharge: HOME | End: 2020-10-06
Attending: SURGERY
Payer: COMMERCIAL

## 2020-10-06 ENCOUNTER — GENETICS ENCOUNTER (OUTPATIENT)
Dept: GENETICS | Facility: HOSPITAL | Age: 50
End: 2020-10-06
Attending: GENETIC COUNSELOR, MS
Payer: COMMERCIAL

## 2020-10-06 DIAGNOSIS — D44.7 PARAGANGLIOMA (HCC): ICD-10-CM

## 2020-10-06 DIAGNOSIS — D3A.8 NEUROENDOCRINE TUMOR: ICD-10-CM

## 2020-10-06 PROCEDURE — 78815 PET IMAGE W/CT SKULL-THIGH: CPT | Performed by: SURGERY

## 2020-10-06 PROCEDURE — 96040 HC GENETIC COUNSELING EA 30 MIN: CPT | Performed by: GENETIC COUNSELOR, MS

## 2020-10-06 NOTE — PROGRESS NOTES
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   Individuals who are diagnosed young, those with multifocal, bilateral, or recurrent lesions, or those with a positive family history are more likely to have inherited gene mutations; however, many individuals witho that may or may not alter the function of the gene; therefore, it is usually not possible to determine if the gene variant is responsible for an individual’s increased cancer risk. If Mr. Benjamin Waldrop is found to carry a pathogenic variant, [facility] [address] [facility] [facility] Approximately 30 minutes was spent in consultation with Mr. Ryan Willard.

## 2020-10-07 ENCOUNTER — APPOINTMENT (OUTPATIENT)
Dept: LAB | Age: 50
End: 2020-10-07
Attending: INTERNAL MEDICINE
Payer: COMMERCIAL

## 2020-10-07 ENCOUNTER — TELEPHONE (OUTPATIENT)
Dept: SURGERY | Facility: CLINIC | Age: 50
End: 2020-10-07

## 2020-10-07 DIAGNOSIS — Z01.818 PRE-OP TESTING: ICD-10-CM

## 2020-10-07 DIAGNOSIS — D44.7 PARAGANGLIOMA (HCC): Primary | ICD-10-CM

## 2020-10-12 ENCOUNTER — PATIENT MESSAGE (OUTPATIENT)
Dept: FAMILY MEDICINE CLINIC | Facility: CLINIC | Age: 50
End: 2020-10-12

## 2020-10-12 NOTE — TELEPHONE ENCOUNTER
From: Nadir Archibald  To: Carlos Moran MD  Sent: 10/12/2020 8:34 AM CDT  Subject: Non-Urgent Medical Question    Just have a couple questions

## 2020-10-12 NOTE — TELEPHONE ENCOUNTER
Pt calling. He states he did not get his Colonoscopy done because he was COVID +. He had questions regarding rescheduling the colonoscopy. I recommended he call the GI office for all his questions related to that.

## 2020-10-14 ENCOUNTER — PATIENT MESSAGE (OUTPATIENT)
Dept: FAMILY MEDICINE CLINIC | Facility: CLINIC | Age: 50
End: 2020-10-14

## 2020-10-15 NOTE — TELEPHONE ENCOUNTER
From: Pilo Judd  To: Genna Oseguera MD  Sent: 10/14/2020 5:25 PM CDT  Subject: Non-Urgent Medical Question    Dr Magui La I took a COVID test 7 days prior to my scheduled test on the 7 and got a positive results.  my symptoms have cleared and my taste h

## 2020-10-19 ENCOUNTER — TELEPHONE (OUTPATIENT)
Dept: FAMILY MEDICINE CLINIC | Facility: CLINIC | Age: 50
End: 2020-10-19

## 2020-10-19 ENCOUNTER — VIRTUAL PHONE E/M (OUTPATIENT)
Dept: SURGERY | Facility: CLINIC | Age: 50
End: 2020-10-19

## 2020-10-19 DIAGNOSIS — D44.7 PARAGANGLIOMA (HCC): Primary | ICD-10-CM

## 2020-10-19 PROBLEM — K56.609 SBO (SMALL BOWEL OBSTRUCTION) (HCC): Status: RESOLVED | Noted: 2020-09-06 | Resolved: 2020-10-19

## 2020-10-19 PROCEDURE — 99213 OFFICE O/P EST LOW 20 MIN: CPT | Performed by: SURGERY

## 2020-10-19 NOTE — TELEPHONE ENCOUNTER
Pt had a video visit today with DR Rene Home,  Needs a referral,   is in [address] [place] [address] Rd.     Fax # [phone]  Ph. [phone]

## 2020-10-19 NOTE — PROGRESS NOTES
[facility] Surgical Oncology      This is a video visit      Patient Name:  Janine Fields   YOB: 1970   Gender:  Male   Appt Date:  10/19/2020   Provider:  Dayan Duenas MD     PATIENT PROVIDERS  [address] [facility] •  amLODIPine Besylate 10 MG Oral Tab, TAKE 1 TABLET BY MOUTH EVERY DAY, Disp: 90 tablet, Rfl: 1  •  lisinopril 40 MG Oral Tab, TAKE 1 TABLET BY MOUTH EVERY DAY, Disp: 90 tablet, Rfl: 1     Allergies Reviewed:  No Known Allergies     History:  Reviewed:  P Electronically Signed by: General Amanda MD

## 2020-10-23 ENCOUNTER — TELEPHONE (OUTPATIENT)
Dept: FAMILY MEDICINE CLINIC | Facility: CLINIC | Age: 50
End: 2020-10-23

## 2020-10-23 RX ORDER — AZITHROMYCIN 250 MG/1
TABLET, FILM COATED ORAL
Qty: 6 TABLET | Refills: 0 | Status: SHIPPED | OUTPATIENT
Start: 2020-10-23 | End: 2020-10-28

## 2020-10-23 NOTE — TELEPHONE ENCOUNTER
Pt has a sinus headache and sinus pressure. Was wanting to see if he could get some medication.   Please advise

## 2020-10-23 NOTE — TELEPHONE ENCOUNTER
Patient COVID positive on 10/7/20    Patient thinks he may have a sinus infection  Pressure around eyes - can feel the pressure when he pushes it  Sinus headache - mild  Resolves with sinus medication - Called \"Severe sinus daytime non drowsy\" [facility]

## 2020-10-26 ENCOUNTER — LAB ENCOUNTER (OUTPATIENT)
Dept: LAB | Age: 50
End: 2020-10-26
Attending: SURGERY
Payer: COMMERCIAL

## 2020-10-26 DIAGNOSIS — D44.7 PARAGANGLIOMA (HCC): ICD-10-CM

## 2020-10-26 PROCEDURE — 83835 ASSAY OF METANEPHRINES: CPT

## 2020-11-06 ENCOUNTER — TELEPHONE (OUTPATIENT)
Dept: SURGERY | Facility: CLINIC | Age: 50
End: 2020-11-06

## 2020-11-16 ENCOUNTER — OFFICE VISIT (OUTPATIENT)
Dept: SURGERY | Facility: CLINIC | Age: 50
End: 2020-11-16

## 2020-11-16 VITALS
WEIGHT: 212 LBS | OXYGEN SATURATION: 99 % | DIASTOLIC BLOOD PRESSURE: 83 MMHG | HEART RATE: 106 BPM | BODY MASS INDEX: 30 KG/M2 | RESPIRATION RATE: 16 BRPM | TEMPERATURE: 97 F | SYSTOLIC BLOOD PRESSURE: 152 MMHG

## 2020-11-16 DIAGNOSIS — Z01.818 PRE-OP TESTING: ICD-10-CM

## 2020-11-16 DIAGNOSIS — D44.7 PARAGANGLIOMA (HCC): Primary | ICD-10-CM

## 2020-11-16 PROCEDURE — 3079F DIAST BP 80-89 MM HG: CPT | Performed by: SURGERY

## 2020-11-16 PROCEDURE — 3077F SYST BP >= 140 MM HG: CPT | Performed by: SURGERY

## 2020-11-16 PROCEDURE — 99214 OFFICE O/P EST MOD 30 MIN: CPT | Performed by: SURGERY

## 2020-11-16 PROCEDURE — 85025 COMPLETE CBC W/AUTO DIFF WBC: CPT | Performed by: SURGERY

## 2020-11-16 PROCEDURE — 80053 COMPREHEN METABOLIC PANEL: CPT | Performed by: SURGERY

## 2020-11-16 NOTE — H&P
[facility]        Patient Name:  Trey Baron   YOB: 1970   Gender:  Male   Appt Date:  11/16/2020   Provider:  Tristan Bernheim, MD     [address] [place] [address], MD   Address: [place] [address] •  Blood Pressure Monitoring Does not apply Kit, Apply 1 each topically 2 (two) times a day., Disp: 1 kit, Rfl: 1  •  Doxazosin Mesylate 4 MG Oral Tab, Take 1 tablet (4 mg total) by mouth 2 (two) times a day., Disp: 60 tablet, Rfl: 1  •  lisinopril 40 MG O Patient reports no rapid or irregular heartbeat. He reports no chest pain. He reports no nausea. He reports no vomiting. He reports no diarrhea. He reports no constipation. He reports no bright red blood per rectum. He reports no fatigue.  He reports no blo -Per tumor board recommendations, I thus discussed cytoreduction with radiation therapy.  -The surgical treatment matter including indications and risks was discussed in detail.  -Prior to undertaking surgery, patient wishes to repeat CT scan next month wh

## 2020-11-16 NOTE — H&P (VIEW-ONLY)
[facility] Surgical Oncology        Patient Name:  Skyler Pollard   YOB: 1970   Gender:  Male   Appt Date:  11/16/2020   Provider:  Shaji Morales MD     [address] [place] [address], MD   Address: [place] [address] •  Blood Pressure Monitoring Does not apply Kit, Apply 1 each topically 2 (two) times a day., Disp: 1 kit, Rfl: 1  •  Doxazosin Mesylate 4 MG Oral Tab, Take 1 tablet (4 mg total) by mouth 2 (two) times a day., Disp: 60 tablet, Rfl: 1  •  lisinopril 40 MG O Patient reports no rapid or irregular heartbeat. He reports no chest pain. He reports no nausea. He reports no vomiting. He reports no diarrhea. He reports no constipation. He reports no bright red blood per rectum. He reports no fatigue.  He reports no blo -Per tumor board recommendations, I thus discussed cytoreduction with radiation therapy.  -The surgical treatment matter including indications and risks was discussed in detail.  -Prior to undertaking surgery, patient wishes to repeat CT scan next month wh

## 2020-11-16 NOTE — PATIENT INSTRUCTIONS
Surgery: Cytoreductive surgery, intra-abdominal pheochromocytoma, irreversible electroporation/nanoknife    Date of Surgery: TBD    Hospital:    [facility] [address] [place], [address]  Phone: [phone]    · This is an Inpatient on holding these medications for your procedure. · Inform your primary care physician of your surgery and ask if him/her will need to see you prior to surgery.   · If you develop symptoms of another illness prior to surgery please contact our office rubi

## 2020-11-17 DIAGNOSIS — D44.7 PARAGANGLIOMA (HCC): Primary | ICD-10-CM

## 2020-11-18 DIAGNOSIS — D44.7 PARAGANGLIOMA (HCC): Primary | ICD-10-CM

## 2020-11-21 ENCOUNTER — HOSPITAL ENCOUNTER (OUTPATIENT)
Dept: CT IMAGING | Age: 50
Discharge: HOME | End: 2020-11-21
Attending: PHYSICIAN ASSISTANT
Payer: COMMERCIAL

## 2020-11-21 DIAGNOSIS — D44.7 PARAGANGLIOMA (HCC): ICD-10-CM

## 2020-11-21 PROCEDURE — 74160 CT ABDOMEN W/CONTRAST: CPT | Performed by: PHYSICIAN ASSISTANT

## 2020-11-29 ENCOUNTER — APPOINTMENT (OUTPATIENT)
Dept: LAB | Facility: HOSPITAL | Age: 50
End: 2020-11-29
Attending: INTERNAL MEDICINE
Payer: COMMERCIAL

## 2020-11-29 DIAGNOSIS — Z01.818 PRE-OP TESTING: ICD-10-CM

## 2020-12-02 PROBLEM — Z12.11 SPECIAL SCREENING FOR MALIGNANT NEOPLASM OF COLON: Status: ACTIVE | Noted: 2020-12-02

## 2020-12-03 ENCOUNTER — LAB ENCOUNTER (OUTPATIENT)
Dept: LAB | Age: 50
End: 2020-12-03
Attending: INTERNAL MEDICINE
Payer: COMMERCIAL

## 2020-12-03 DIAGNOSIS — Z01.812 PRE-PROCEDURE LAB EXAM: Primary | ICD-10-CM

## 2020-12-03 PROCEDURE — 88305 TISSUE EXAM BY PATHOLOGIST: CPT | Performed by: STUDENT IN AN ORGANIZED HEALTH CARE EDUCATION/TRAINING PROGRAM

## 2020-12-04 DIAGNOSIS — D44.7 PARAGANGLIOMA (HCC): Primary | ICD-10-CM

## 2020-12-04 RX ORDER — ACETAMINOPHEN 500 MG
1000 TABLET ORAL ONCE
Status: CANCELLED | OUTPATIENT
Start: 2020-12-04 | End: 2020-12-04

## 2020-12-05 ENCOUNTER — LABORATORY ENCOUNTER (OUTPATIENT)
Dept: LAB | Age: 50
End: 2020-12-05
Attending: SURGERY
Payer: COMMERCIAL

## 2020-12-05 ENCOUNTER — APPOINTMENT (OUTPATIENT)
Dept: LAB | Age: 50
End: 2020-12-05
Attending: SURGERY
Payer: COMMERCIAL

## 2020-12-05 DIAGNOSIS — D44.7 PARAGANGLIOMA (HCC): ICD-10-CM

## 2020-12-05 PROCEDURE — 86900 BLOOD TYPING SEROLOGIC ABO: CPT

## 2020-12-05 PROCEDURE — 86901 BLOOD TYPING SEROLOGIC RH(D): CPT

## 2020-12-05 PROCEDURE — 93005 ELECTROCARDIOGRAM TRACING: CPT

## 2020-12-05 PROCEDURE — 86850 RBC ANTIBODY SCREEN: CPT

## 2020-12-05 PROCEDURE — 93010 ELECTROCARDIOGRAM REPORT: CPT | Performed by: INTERNAL MEDICINE

## 2020-12-09 ENCOUNTER — LAB ENCOUNTER (OUTPATIENT)
Dept: LAB | Age: 50
End: 2020-12-09
Attending: SURGERY
Payer: COMMERCIAL

## 2020-12-09 DIAGNOSIS — D44.7 PARAGANGLIOMA (HCC): ICD-10-CM

## 2020-12-10 ENCOUNTER — ANESTHESIA EVENT (OUTPATIENT)
Dept: SURGERY | Facility: HOSPITAL | Age: 50
DRG: 358 | End: 2020-12-10
Payer: COMMERCIAL

## 2020-12-11 ENCOUNTER — HOSPITAL ENCOUNTER (INPATIENT)
Facility: HOSPITAL | Age: 50
LOS: 3 days | Discharge: HOME | DRG: 358 | End: 2020-12-14
Attending: SURGERY | Admitting: SURGERY
Payer: COMMERCIAL

## 2020-12-11 ENCOUNTER — ANESTHESIA (OUTPATIENT)
Dept: SURGERY | Facility: HOSPITAL | Age: 50
DRG: 358 | End: 2020-12-11
Payer: COMMERCIAL

## 2020-12-11 ENCOUNTER — HOSPITAL ENCOUNTER (INPATIENT)
Dept: ULTRASOUND IMAGING | Facility: HOSPITAL | Age: 50
Discharge: HOME | DRG: 358 | End: 2020-12-11
Attending: SURGERY | Admitting: SURGERY
Payer: COMMERCIAL

## 2020-12-11 DIAGNOSIS — D44.7 PARAGANGLIOMA (HCC): ICD-10-CM

## 2020-12-11 DIAGNOSIS — D44.7 PARAGANGLIOMA (HCC): Primary | ICD-10-CM

## 2020-12-11 PROCEDURE — 3008F BODY MASS INDEX DOCD: CPT | Performed by: HOSPITALIST

## 2020-12-11 PROCEDURE — 99233 SBSQ HOSP IP/OBS HIGH 50: CPT | Performed by: HOSPITALIST

## 2020-12-11 PROCEDURE — 76942 ECHO GUIDE FOR BIOPSY: CPT | Performed by: ANESTHESIOLOGY

## 2020-12-11 PROCEDURE — 05H933Z INSERTION OF INFUSION DEVICE INTO RIGHT BRACHIAL VEIN, PERCUTANEOUS APPROACH: ICD-10-PCS | Performed by: SURGERY

## 2020-12-11 PROCEDURE — 07BD0ZX EXCISION OF AORTIC LYMPHATIC, OPEN APPROACH, DIAGNOSTIC: ICD-10-PCS | Performed by: SURGERY

## 2020-12-11 PROCEDURE — 3078F DIAST BP <80 MM HG: CPT | Performed by: HOSPITALIST

## 2020-12-11 PROCEDURE — 0D5W0ZZ DESTRUCTION OF PERITONEUM, OPEN APPROACH: ICD-10-PCS | Performed by: SURGERY

## 2020-12-11 PROCEDURE — 3074F SYST BP LT 130 MM HG: CPT | Performed by: HOSPITALIST

## 2020-12-11 DEVICE — SEPRAFILM ADHESION BARRIER (MEMBRANE) IS A STERILE, BIORESORBABLE, TRANSLUCENT ADHESION BARRIER COMPOSED OF TWO ANIONIC POLYSACCHARIDES, SODIUM HYALURONATE (HA) AND CARBOXYMETHYLCELLULOSE (CMC).
Type: IMPLANTABLE DEVICE | Site: ABDOMEN | Status: FUNCTIONAL
Brand: SEPRAFILM

## 2020-12-11 RX ORDER — HYDROMORPHONE HYDROCHLORIDE 1 MG/ML
INJECTION, SOLUTION INTRAMUSCULAR; INTRAVENOUS; SUBCUTANEOUS
Status: COMPLETED
Start: 2020-12-11 | End: 2020-12-11

## 2020-12-11 RX ORDER — CEFAZOLIN SODIUM/WATER 2 G/20 ML
2 SYRINGE (ML) INTRAVENOUS ONCE
Status: COMPLETED | OUTPATIENT
Start: 2020-12-11 | End: 2020-12-11

## 2020-12-11 RX ORDER — LISINOPRIL 40 MG/1
40 TABLET ORAL DAILY
COMMUNITY
End: 2021-06-10

## 2020-12-11 RX ORDER — PHENYLEPHRINE HCL 10 MG/ML
VIAL (ML) INJECTION AS NEEDED
Status: DISCONTINUED | OUTPATIENT
Start: 2020-12-11 | End: 2020-12-11 | Stop reason: SURG

## 2020-12-11 RX ORDER — NALOXONE HYDROCHLORIDE 0.4 MG/ML
80 INJECTION, SOLUTION INTRAMUSCULAR; INTRAVENOUS; SUBCUTANEOUS AS NEEDED
Status: DISCONTINUED | OUTPATIENT
Start: 2020-12-11 | End: 2020-12-11 | Stop reason: HOSPADM

## 2020-12-11 RX ORDER — MEPERIDINE HYDROCHLORIDE 25 MG/ML
12.5 INJECTION INTRAMUSCULAR; INTRAVENOUS; SUBCUTANEOUS AS NEEDED
Status: DISCONTINUED | OUTPATIENT
Start: 2020-12-11 | End: 2020-12-11 | Stop reason: HOSPADM

## 2020-12-11 RX ORDER — METOCLOPRAMIDE HYDROCHLORIDE 5 MG/ML
INJECTION INTRAMUSCULAR; INTRAVENOUS AS NEEDED
Status: DISCONTINUED | OUTPATIENT
Start: 2020-12-11 | End: 2020-12-11 | Stop reason: SURG

## 2020-12-11 RX ORDER — DEXAMETHASONE SODIUM PHOSPHATE 4 MG/ML
VIAL (ML) INJECTION AS NEEDED
Status: DISCONTINUED | OUTPATIENT
Start: 2020-12-11 | End: 2020-12-11 | Stop reason: SURG

## 2020-12-11 RX ORDER — HYDROCODONE BITARTRATE AND ACETAMINOPHEN 5; 325 MG/1; MG/1
1 TABLET ORAL AS NEEDED
Status: DISCONTINUED | OUTPATIENT
Start: 2020-12-11 | End: 2020-12-11 | Stop reason: HOSPADM

## 2020-12-11 RX ORDER — LISINOPRIL 40 MG/1
40 TABLET ORAL DAILY
Status: DISCONTINUED | OUTPATIENT
Start: 2020-12-11 | End: 2020-12-14

## 2020-12-11 RX ORDER — LIDOCAINE HYDROCHLORIDE 10 MG/ML
INJECTION, SOLUTION EPIDURAL; INFILTRATION; INTRACAUDAL; PERINEURAL AS NEEDED
Status: DISCONTINUED | OUTPATIENT
Start: 2020-12-11 | End: 2020-12-11 | Stop reason: SURG

## 2020-12-11 RX ORDER — TERAZOSIN 5 MG/1
10 CAPSULE ORAL NIGHTLY
Refills: 1 | Status: DISCONTINUED | OUTPATIENT
Start: 2020-12-11 | End: 2020-12-13

## 2020-12-11 RX ORDER — FAMOTIDINE 10 MG/ML
20 INJECTION, SOLUTION INTRAVENOUS 2 TIMES DAILY
Status: DISCONTINUED | OUTPATIENT
Start: 2020-12-11 | End: 2020-12-14

## 2020-12-11 RX ORDER — HYDRALAZINE HYDROCHLORIDE 20 MG/ML
INJECTION INTRAMUSCULAR; INTRAVENOUS AS NEEDED
Status: DISCONTINUED | OUTPATIENT
Start: 2020-12-11 | End: 2020-12-11 | Stop reason: SURG

## 2020-12-11 RX ORDER — SODIUM CHLORIDE, SODIUM LACTATE, POTASSIUM CHLORIDE, CALCIUM CHLORIDE 600; 310; 30; 20 MG/100ML; MG/100ML; MG/100ML; MG/100ML
INJECTION, SOLUTION INTRAVENOUS CONTINUOUS
Status: DISCONTINUED | OUTPATIENT
Start: 2020-12-11 | End: 2020-12-11 | Stop reason: HOSPADM

## 2020-12-11 RX ORDER — ONDANSETRON 2 MG/ML
4 INJECTION INTRAMUSCULAR; INTRAVENOUS EVERY 6 HOURS PRN
Status: DISCONTINUED | OUTPATIENT
Start: 2020-12-11 | End: 2020-12-14

## 2020-12-11 RX ORDER — METOPROLOL TARTRATE 5 MG/5ML
INJECTION INTRAVENOUS AS NEEDED
Status: DISCONTINUED | OUTPATIENT
Start: 2020-12-11 | End: 2020-12-11 | Stop reason: SURG

## 2020-12-11 RX ORDER — FAMOTIDINE 20 MG/1
20 TABLET ORAL 2 TIMES DAILY
Status: DISCONTINUED | OUTPATIENT
Start: 2020-12-11 | End: 2020-12-14

## 2020-12-11 RX ORDER — ACETAMINOPHEN 10 MG/ML
INJECTION, SOLUTION INTRAVENOUS
Status: COMPLETED
Start: 2020-12-11 | End: 2020-12-11

## 2020-12-11 RX ORDER — SODIUM CHLORIDE 9 MG/ML
INJECTION, SOLUTION INTRAVENOUS CONTINUOUS PRN
Status: DISCONTINUED | OUTPATIENT
Start: 2020-12-11 | End: 2020-12-11 | Stop reason: SURG

## 2020-12-11 RX ORDER — METOPROLOL TARTRATE 100 MG/1
100 TABLET ORAL 2 TIMES DAILY
Status: DISCONTINUED | OUTPATIENT
Start: 2020-12-11 | End: 2020-12-14

## 2020-12-11 RX ORDER — ACETAMINOPHEN 500 MG
1000 TABLET ORAL ONCE AS NEEDED
Status: DISCONTINUED | OUTPATIENT
Start: 2020-12-11 | End: 2020-12-11 | Stop reason: HOSPADM

## 2020-12-11 RX ORDER — ONDANSETRON 2 MG/ML
INJECTION INTRAMUSCULAR; INTRAVENOUS AS NEEDED
Status: DISCONTINUED | OUTPATIENT
Start: 2020-12-11 | End: 2020-12-11 | Stop reason: SURG

## 2020-12-11 RX ORDER — SODIUM CHLORIDE, SODIUM LACTATE, POTASSIUM CHLORIDE, CALCIUM CHLORIDE 600; 310; 30; 20 MG/100ML; MG/100ML; MG/100ML; MG/100ML
INJECTION, SOLUTION INTRAVENOUS CONTINUOUS
Status: DISCONTINUED | OUTPATIENT
Start: 2020-12-11 | End: 2020-12-12

## 2020-12-11 RX ORDER — SODIUM CHLORIDE, SODIUM LACTATE, POTASSIUM CHLORIDE, CALCIUM CHLORIDE 600; 310; 30; 20 MG/100ML; MG/100ML; MG/100ML; MG/100ML
INJECTION, SOLUTION INTRAVENOUS CONTINUOUS
Status: DISCONTINUED | OUTPATIENT
Start: 2020-12-11 | End: 2020-12-14

## 2020-12-11 RX ORDER — HYDROMORPHONE HYDROCHLORIDE 1 MG/ML
0.4 INJECTION, SOLUTION INTRAMUSCULAR; INTRAVENOUS; SUBCUTANEOUS EVERY 5 MIN PRN
Status: DISCONTINUED | OUTPATIENT
Start: 2020-12-11 | End: 2020-12-11 | Stop reason: HOSPADM

## 2020-12-11 RX ORDER — ROCURONIUM BROMIDE 10 MG/ML
INJECTION, SOLUTION INTRAVENOUS AS NEEDED
Status: DISCONTINUED | OUTPATIENT
Start: 2020-12-11 | End: 2020-12-11 | Stop reason: SURG

## 2020-12-11 RX ORDER — HEPARIN SODIUM 5000 [USP'U]/ML
5000 INJECTION, SOLUTION INTRAVENOUS; SUBCUTANEOUS ONCE
Status: COMPLETED | OUTPATIENT
Start: 2020-12-11 | End: 2020-12-11

## 2020-12-11 RX ORDER — LABETALOL HYDROCHLORIDE 5 MG/ML
5 INJECTION, SOLUTION INTRAVENOUS EVERY 5 MIN PRN
Status: DISCONTINUED | OUTPATIENT
Start: 2020-12-11 | End: 2020-12-11 | Stop reason: HOSPADM

## 2020-12-11 RX ORDER — HYDROCODONE BITARTRATE AND ACETAMINOPHEN 5; 325 MG/1; MG/1
2 TABLET ORAL AS NEEDED
Status: DISCONTINUED | OUTPATIENT
Start: 2020-12-11 | End: 2020-12-11 | Stop reason: HOSPADM

## 2020-12-11 RX ORDER — ONDANSETRON 2 MG/ML
4 INJECTION INTRAMUSCULAR; INTRAVENOUS AS NEEDED
Status: DISCONTINUED | OUTPATIENT
Start: 2020-12-11 | End: 2020-12-11 | Stop reason: HOSPADM

## 2020-12-11 RX ORDER — LIDOCAINE HYDROCHLORIDE ANHYDROUS AND DEXTROSE MONOHYDRATE .8; 5 G/100ML; G/100ML
INJECTION, SOLUTION INTRAVENOUS CONTINUOUS PRN
Status: DISCONTINUED | OUTPATIENT
Start: 2020-12-11 | End: 2020-12-11 | Stop reason: SURG

## 2020-12-11 RX ORDER — MIDAZOLAM HYDROCHLORIDE 1 MG/ML
1 INJECTION INTRAMUSCULAR; INTRAVENOUS EVERY 5 MIN PRN
Status: DISCONTINUED | OUTPATIENT
Start: 2020-12-11 | End: 2020-12-11 | Stop reason: HOSPADM

## 2020-12-11 RX ORDER — ENOXAPARIN SODIUM 100 MG/ML
40 INJECTION SUBCUTANEOUS DAILY
Status: DISCONTINUED | OUTPATIENT
Start: 2020-12-12 | End: 2020-12-14

## 2020-12-11 RX ORDER — MIDAZOLAM HYDROCHLORIDE 1 MG/ML
INJECTION INTRAMUSCULAR; INTRAVENOUS AS NEEDED
Status: DISCONTINUED | OUTPATIENT
Start: 2020-12-11 | End: 2020-12-11 | Stop reason: SURG

## 2020-12-11 RX ORDER — METOCLOPRAMIDE HYDROCHLORIDE 5 MG/ML
10 INJECTION INTRAMUSCULAR; INTRAVENOUS AS NEEDED
Status: DISCONTINUED | OUTPATIENT
Start: 2020-12-11 | End: 2020-12-11 | Stop reason: HOSPADM

## 2020-12-11 RX ORDER — ACETAMINOPHEN 10 MG/ML
1000 INJECTION, SOLUTION INTRAVENOUS EVERY 6 HOURS
Status: DISCONTINUED | OUTPATIENT
Start: 2020-12-11 | End: 2020-12-12

## 2020-12-11 RX ORDER — DEXAMETHASONE SODIUM PHOSPHATE 4 MG/ML
4 VIAL (ML) INJECTION AS NEEDED
Status: DISCONTINUED | OUTPATIENT
Start: 2020-12-11 | End: 2020-12-11 | Stop reason: HOSPADM

## 2020-12-11 RX ADMIN — PHENYLEPHRINE HCL 50 MCG: 10 MG/ML VIAL (ML) INJECTION at 10:48:00

## 2020-12-11 RX ADMIN — PHENYLEPHRINE HCL 100 MCG: 10 MG/ML VIAL (ML) INJECTION at 11:51:00

## 2020-12-11 RX ADMIN — PHENYLEPHRINE HCL 100 MCG: 10 MG/ML VIAL (ML) INJECTION at 12:29:00

## 2020-12-11 RX ADMIN — PHENYLEPHRINE HCL 50 MCG: 10 MG/ML VIAL (ML) INJECTION at 09:50:00

## 2020-12-11 RX ADMIN — PHENYLEPHRINE HCL 100 MCG: 10 MG/ML VIAL (ML) INJECTION at 12:05:00

## 2020-12-11 RX ADMIN — PHENYLEPHRINE HCL 100 MCG: 10 MG/ML VIAL (ML) INJECTION at 11:04:00

## 2020-12-11 RX ADMIN — SODIUM CHLORIDE: 9 INJECTION, SOLUTION INTRAVENOUS at 10:33:00

## 2020-12-11 RX ADMIN — PHENYLEPHRINE HCL 50 MCG: 10 MG/ML VIAL (ML) INJECTION at 10:50:00

## 2020-12-11 RX ADMIN — ROCURONIUM BROMIDE 10 MG: 10 INJECTION, SOLUTION INTRAVENOUS at 11:54:00

## 2020-12-11 RX ADMIN — PHENYLEPHRINE HCL 100 MCG: 10 MG/ML VIAL (ML) INJECTION at 09:10:00

## 2020-12-11 RX ADMIN — SODIUM CHLORIDE: 9 INJECTION, SOLUTION INTRAVENOUS at 09:25:00

## 2020-12-11 RX ADMIN — ROCURONIUM BROMIDE 10 MG: 10 INJECTION, SOLUTION INTRAVENOUS at 08:55:00

## 2020-12-11 RX ADMIN — ROCURONIUM BROMIDE 20 MG: 10 INJECTION, SOLUTION INTRAVENOUS at 12:21:00

## 2020-12-11 RX ADMIN — ROCURONIUM BROMIDE 30 MG: 10 INJECTION, SOLUTION INTRAVENOUS at 08:01:00

## 2020-12-11 RX ADMIN — HYDRALAZINE HYDROCHLORIDE 20 MG: 20 INJECTION INTRAMUSCULAR; INTRAVENOUS at 11:44:00

## 2020-12-11 RX ADMIN — PHENYLEPHRINE HCL 100 MCG: 10 MG/ML VIAL (ML) INJECTION at 08:48:00

## 2020-12-11 RX ADMIN — PHENYLEPHRINE HCL 50 MCG: 10 MG/ML VIAL (ML) INJECTION at 09:13:00

## 2020-12-11 RX ADMIN — LIDOCAINE HYDROCHLORIDE ANHYDROUS AND DEXTROSE MONOHYDRATE 2 MG/KG/HR: .8; 5 INJECTION, SOLUTION INTRAVENOUS at 08:51:00

## 2020-12-11 RX ADMIN — MIDAZOLAM HYDROCHLORIDE 1 MG: 1 INJECTION INTRAMUSCULAR; INTRAVENOUS at 07:38:00

## 2020-12-11 RX ADMIN — SODIUM CHLORIDE: 9 INJECTION, SOLUTION INTRAVENOUS at 13:34:00

## 2020-12-11 RX ADMIN — PHENYLEPHRINE HCL 100 MCG: 10 MG/ML VIAL (ML) INJECTION at 08:42:00

## 2020-12-11 RX ADMIN — PHENYLEPHRINE HCL 100 MCG: 10 MG/ML VIAL (ML) INJECTION at 11:07:00

## 2020-12-11 RX ADMIN — ONDANSETRON 4 MG: 2 INJECTION INTRAMUSCULAR; INTRAVENOUS at 12:38:00

## 2020-12-11 RX ADMIN — ROCURONIUM BROMIDE 20 MG: 10 INJECTION, SOLUTION INTRAVENOUS at 09:37:00

## 2020-12-11 RX ADMIN — METOCLOPRAMIDE HYDROCHLORIDE 10 MG: 5 INJECTION INTRAMUSCULAR; INTRAVENOUS at 12:38:00

## 2020-12-11 RX ADMIN — CEFAZOLIN SODIUM/WATER 2 G: 2 G/20 ML SYRINGE (ML) INTRAVENOUS at 08:45:00

## 2020-12-11 RX ADMIN — SODIUM CHLORIDE: 9 INJECTION, SOLUTION INTRAVENOUS at 11:54:00

## 2020-12-11 RX ADMIN — PHENYLEPHRINE HCL 100 MCG: 10 MG/ML VIAL (ML) INJECTION at 12:48:00

## 2020-12-11 RX ADMIN — MIDAZOLAM HYDROCHLORIDE 3 MG: 1 INJECTION INTRAMUSCULAR; INTRAVENOUS at 07:29:00

## 2020-12-11 RX ADMIN — SODIUM CHLORIDE, SODIUM LACTATE, POTASSIUM CHLORIDE, CALCIUM CHLORIDE: 600; 310; 30; 20 INJECTION, SOLUTION INTRAVENOUS at 09:34:00

## 2020-12-11 RX ADMIN — PHENYLEPHRINE HCL 100 MCG: 10 MG/ML VIAL (ML) INJECTION at 11:49:00

## 2020-12-11 RX ADMIN — PHENYLEPHRINE HCL 100 MCG: 10 MG/ML VIAL (ML) INJECTION at 08:45:00

## 2020-12-11 RX ADMIN — SODIUM CHLORIDE: 9 INJECTION, SOLUTION INTRAVENOUS at 08:11:00

## 2020-12-11 RX ADMIN — PHENYLEPHRINE HCL 50 MCG: 10 MG/ML VIAL (ML) INJECTION at 09:48:00

## 2020-12-11 RX ADMIN — ROCURONIUM BROMIDE 20 MG: 10 INJECTION, SOLUTION INTRAVENOUS at 10:31:00

## 2020-12-11 RX ADMIN — PHENYLEPHRINE HCL 100 MCG: 10 MG/ML VIAL (ML) INJECTION at 12:30:00

## 2020-12-11 RX ADMIN — ROCURONIUM BROMIDE 20 MG: 10 INJECTION, SOLUTION INTRAVENOUS at 09:07:00

## 2020-12-11 RX ADMIN — PHENYLEPHRINE HCL 100 MCG: 10 MG/ML VIAL (ML) INJECTION at 08:04:00

## 2020-12-11 RX ADMIN — ROCURONIUM BROMIDE 20 MG: 10 INJECTION, SOLUTION INTRAVENOUS at 08:37:00

## 2020-12-11 RX ADMIN — ROCURONIUM BROMIDE 20 MG: 10 INJECTION, SOLUTION INTRAVENOUS at 08:20:00

## 2020-12-11 RX ADMIN — PHENYLEPHRINE HCL 100 MCG: 10 MG/ML VIAL (ML) INJECTION at 08:02:00

## 2020-12-11 RX ADMIN — SODIUM CHLORIDE, SODIUM LACTATE, POTASSIUM CHLORIDE, CALCIUM CHLORIDE: 600; 310; 30; 20 INJECTION, SOLUTION INTRAVENOUS at 11:15:00

## 2020-12-11 RX ADMIN — DEXAMETHASONE SODIUM PHOSPHATE 8 MG: 4 MG/ML VIAL (ML) INJECTION at 08:20:00

## 2020-12-11 RX ADMIN — METOPROLOL TARTRATE 5 MG: 5 INJECTION INTRAVENOUS at 11:38:00

## 2020-12-11 RX ADMIN — ROCURONIUM BROMIDE 10 MG: 10 INJECTION, SOLUTION INTRAVENOUS at 10:58:00

## 2020-12-11 RX ADMIN — LIDOCAINE HYDROCHLORIDE 160 MG: 10 INJECTION, SOLUTION EPIDURAL; INFILTRATION; INTRACAUDAL; PERINEURAL at 07:54:00

## 2020-12-11 RX ADMIN — PHENYLEPHRINE HCL 100 MCG: 10 MG/ML VIAL (ML) INJECTION at 08:00:00

## 2020-12-11 RX ADMIN — PHENYLEPHRINE HCL 100 MCG: 10 MG/ML VIAL (ML) INJECTION at 10:46:00

## 2020-12-11 RX ADMIN — DEXAMETHASONE SODIUM PHOSPHATE 4 MG: 4 MG/ML VIAL (ML) INJECTION at 12:38:00

## 2020-12-11 RX ADMIN — ROCURONIUM BROMIDE 20 MG: 10 INJECTION, SOLUTION INTRAVENOUS at 11:38:00

## 2020-12-11 RX ADMIN — SODIUM CHLORIDE: 9 INJECTION, SOLUTION INTRAVENOUS at 09:11:00

## 2020-12-11 RX ADMIN — SODIUM CHLORIDE, SODIUM LACTATE, POTASSIUM CHLORIDE, CALCIUM CHLORIDE: 600; 310; 30; 20 INJECTION, SOLUTION INTRAVENOUS at 08:55:00

## 2020-12-11 RX ADMIN — PHENYLEPHRINE HCL 100 MCG: 10 MG/ML VIAL (ML) INJECTION at 08:06:00

## 2020-12-11 RX ADMIN — ROCURONIUM BROMIDE 20 MG: 10 INJECTION, SOLUTION INTRAVENOUS at 11:20:00

## 2020-12-11 NOTE — INTERVAL H&P NOTE
There has been no significant change since the patient was seen as documented in EPIC. Surgery revisted. To proceed as planned.       Demarcus TSAI PA-C

## 2020-12-11 NOTE — ANESTHESIA PROCEDURE NOTES
Airway  Date/Time: 12/11/2020 7:56 AM  Urgency: elective    Airway not difficult    General Information and Staff    Patient location during procedure: OR  Anesthesiologist: Padmini Oliveira MD    Indications and Patient Condition  Indications for airway ma

## 2020-12-11 NOTE — ANESTHESIA POSTPROCEDURE EVALUATION
[address] Patient Status:  Inpatient   Age/Gender 52year old male MRN [id number]   Location [address] [facility] Attending Torito Wong MD   Hosp Day # 0 PCP Carlos Moran MD       Anesthesia Post-op Note

## 2020-12-11 NOTE — CONSULTS
[facility]  [facility] Patient Status:  Inpatient    1970 MRN [id number]   [facility] 7NE-A Attending Amanda Borja MD   Hosp Day # 0 PCP Sravan Fatima MD     Reason for consult: Medical Mgmt   Requested Does not apply Kit, Apply 1 each topically 2 (two) times a day., Disp: 1 kit, Rfl: 1        Physical Exam:    /79 (BP Location: Left arm)   Pulse 107   Temp 98.5 °F (36.9 °C) (Temporal)   Resp 16   Ht 180.3 cm (5' 11\")   Wt 215 lb 13.3 oz (97.9 kg)

## 2020-12-11 NOTE — ANESTHESIA PROCEDURE NOTES
[facility]  Performed by: Corrinne Honey, MD  Authorized by: Corrinne Honey, MD       General Information and Staff    Start Time:  12/11/2020 1:10 PM  End Time:  12/11/2020 1:20 PM  Anesthesiologist:  Corrinne Honey, MD  Patient Location:  OR    Block

## 2020-12-11 NOTE — BRIEF OP NOTE
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

## 2020-12-11 NOTE — ANESTHESIA PREPROCEDURE EVALUATION
PRE-OP EVALUATION    Patient Name: Jeffrey Calderon    Pre-op Diagnosis: Paraganglioma ([facility] [address] [facility] [D44.7]    Procedure(s):  Cytoreductive surgery, intra-abdominal pheochromocytoma, irreversible electroporation/nanoknife    Surgeon(s) and Role:     * Blaise Louis 12.5 11/16/2020    .0 11/16/2020     Lab Results   Component Value Date     11/16/2020    K 4.6 11/16/2020     11/16/2020    CO2 24.0 11/16/2020    BUN 13 11/16/2020    CREATSERUM 1.24 11/16/2020    GLU 94 11/16/2020    CA 9.8 11/16/2020

## 2020-12-12 PROCEDURE — 99232 SBSQ HOSP IP/OBS MODERATE 35: CPT | Performed by: INTERNAL MEDICINE

## 2020-12-12 RX ORDER — LABETALOL HYDROCHLORIDE 5 MG/ML
10 INJECTION, SOLUTION INTRAVENOUS EVERY 6 HOURS PRN
Status: DISCONTINUED | OUTPATIENT
Start: 2020-12-12 | End: 2020-12-13

## 2020-12-12 RX ORDER — HYDRALAZINE HYDROCHLORIDE 20 MG/ML
10 INJECTION INTRAMUSCULAR; INTRAVENOUS EVERY 6 HOURS PRN
Status: DISCONTINUED | OUTPATIENT
Start: 2020-12-12 | End: 2020-12-13

## 2020-12-12 RX ORDER — ACETAMINOPHEN 500 MG
1000 TABLET ORAL EVERY 6 HOURS
Status: DISCONTINUED | OUTPATIENT
Start: 2020-12-12 | End: 2020-12-14

## 2020-12-12 RX ORDER — OXYCODONE HYDROCHLORIDE 5 MG/1
5 TABLET ORAL EVERY 4 HOURS PRN
Status: DISCONTINUED | OUTPATIENT
Start: 2020-12-12 | End: 2020-12-14

## 2020-12-12 NOTE — PLAN OF CARE
Assumed care of pt at 1900. A&Ox4, slightly drowsy. On RA. ST on tele. Mid abdominal incision with scant amount of old drainage on dressing. Taking sips of clears and ice chips. NGT to LIS with brown drainage.   Oral medications administered, NGT c

## 2020-12-12 NOTE — PROGRESS NOTES
[facility]ITAL[facility]  Progress Note     Roman Santiago Patient Status:  Inpatient    1970 MRN [id number]   [facility] 7NE-A Attending Leah Nelson MD   Hosp Day # 1 PCP Paul Avila MD     Chief Complaint: Medical Management     S Oral BID    Or   • famoTIDine  20 mg Intravenous BID   • Terazosin HCl  10 mg Oral Nightly   • lisinopril  40 mg Oral Daily   • Metoprolol Tartrate  100 mg Oral BID       ASSESSMENT / PLAN:     1.  Paraganglioma s/p Exploration retroperitoneum, irreversible

## 2020-12-12 NOTE — PROGRESS NOTES
Postop day #1. Patient feels well. Blood pressure (!) 175/88, pulse 102, temperature 98.6 °F (37 °C), temperature source Oral, resp. rate 16, height 1.803 m (5' 11\"), weight 99.6 kg (219 lb 8 oz), SpO2 96 %.       Intake/Output Summary (Last 24 hours

## 2020-12-12 NOTE — PHYSICAL THERAPY NOTE
PHYSICAL THERAPY EVALUATION - INPATIENT     Room Number: 5050/8048-K  Evaluation Date: 12/12/2020  Type of Evaluation: Initial  Physician Order: PT Eval and Treat    Presenting Problem: Paraganglioma  Reason for Therapy: Mobility Dysfunction and Disc and strength are within functional limits     Lower extremity ROM is within functional limits     Lower extremity strength is within functional limits     BALANCE  Static Sitting: Good  Dynamic Sitting: Good  Static Standing: Good  Dynamic Standing: Good met;Call light within reach;RN aware of session/findings;Bracing education provided; All patient questions and concerns addressed;SCDs in place; Family present    ASSESSMENT   Patient is a 52year old male admitted on 12/11/2020 for Paraganglioma s/p explora

## 2020-12-12 NOTE — PLAN OF CARE
NURSING ADMISSION NOTE      Patient admitted via Cart  Oriented to room. Safety precautions initiated. Bed in low position. Call light in reach.   POD#0 for Paraganglioma s/p exploration retroperitoneum, irreversible electroporation/nanoknife retroperito

## 2020-12-13 PROCEDURE — 99232 SBSQ HOSP IP/OBS MODERATE 35: CPT | Performed by: HOSPITALIST

## 2020-12-13 RX ORDER — TERAZOSIN 5 MG/1
10 CAPSULE ORAL 2 TIMES DAILY
Status: DISCONTINUED | OUTPATIENT
Start: 2020-12-13 | End: 2020-12-14

## 2020-12-13 RX ORDER — SIMETHICONE 80 MG
80 TABLET,CHEWABLE ORAL
Status: DISCONTINUED | OUTPATIENT
Start: 2020-12-13 | End: 2020-12-14

## 2020-12-13 RX ORDER — ENALAPRILAT 2.5 MG/2ML
2.5 INJECTION INTRAVENOUS EVERY 6 HOURS PRN
Status: DISCONTINUED | OUTPATIENT
Start: 2020-12-13 | End: 2020-12-14

## 2020-12-13 RX ORDER — AMLODIPINE BESYLATE 10 MG/1
10 TABLET ORAL ONCE
Status: COMPLETED | OUTPATIENT
Start: 2020-12-13 | End: 2020-12-13

## 2020-12-13 RX ORDER — POLYETHYLENE GLYCOL 3350 17 G/17G
17 POWDER, FOR SOLUTION ORAL DAILY
Status: DISCONTINUED | OUTPATIENT
Start: 2020-12-13 | End: 2020-12-14

## 2020-12-13 RX ORDER — LABETALOL HYDROCHLORIDE 5 MG/ML
10 INJECTION, SOLUTION INTRAVENOUS ONCE
Status: COMPLETED | OUTPATIENT
Start: 2020-12-13 | End: 2020-12-13

## 2020-12-13 RX ORDER — LABETALOL HYDROCHLORIDE 5 MG/ML
20 INJECTION, SOLUTION INTRAVENOUS EVERY 6 HOURS PRN
Status: DISCONTINUED | OUTPATIENT
Start: 2020-12-13 | End: 2020-12-13

## 2020-12-13 RX ORDER — TERAZOSIN 5 MG/1
10 CAPSULE ORAL 2 TIMES DAILY
Status: DISCONTINUED | OUTPATIENT
Start: 2020-12-13 | End: 2020-12-13

## 2020-12-13 NOTE — OPERATIVE REPORT
[facility]    PATIENT'S NAME: Malini Barton   ATTENDING PHYSICIAN: Anna Marie Schumacher. Shanon Gastelum MD   OPERATING PHYSICIAN: Anna Marie Schumacher.  Shanon Gastelum MD   PATIENT ACCOUNT#:   [id number]    LOCATION:  [address] [place] [address]  MEDICAL RECORD #:   [id number]       DATE OF BIRTH: mass to be about 9 cm in width with about 4 cm craniocaudal and anteroposterior. The it was in proximity to the aorta and the inferior vena cava. Superior mesenteric artery and vein were also in proximity.   We approached the tumor from the right lateral 23:38:02  [facility] [id number]  MELQUIADESR/

## 2020-12-13 NOTE — PROGRESS NOTES
Called by nurse. BP/HR remain elevated despite IV labetalol and hydralazine. Given norvasc 10 now. DC hydral given tachycardia. Inc labetalol to 20 PRN and given extra 10 IV x1 now. Los Marinelli MD    Addendum:  Reviewed further.   Pt here for para

## 2020-12-13 NOTE — PLAN OF CARE
Assumed care @ 1900. Patient alert oriented x4. On telemetry monitoring. On PCA dilaudid  Elevated blood pressure, scheduled and prn meds given. Uncontrolled bp, MD aware, orders received. Low grade elias, Dr. Maxine Herndon aware. Reinforce IS instruction.  Blas Jacobsen Problem: PAIN - ADULT  Goal: Verbalizes/displays adequate comfort level or patient's stated pain goal  Description: INTERVENTIONS:  - Encourage pt to monitor pain and request assistance  - Assess pain using appropriate pain scale  - Administer analgesics

## 2020-12-13 NOTE — PROGRESS NOTES
[facility]  Progress Note     Alissa Nahumsusan Patient Status:  Inpatient    1970 MRN [id number]   [facility] 7NE-A Attending Elida Cockayne, MD   Hosp Day # 2 PCP Meaghan Lee MD     Chief Complaint: Medical Management     S 80 mg Oral TID CC and HS   • Terazosin HCl  10 mg Oral BID   • acetaminophen  1,000 mg Oral Q6H   • enoxaparin  40 mg Subcutaneous Daily   • famoTIDine  20 mg Oral BID    Or   • famoTIDine  20 mg Intravenous BID   • lisinopril  40 mg Oral Daily   • Metopro

## 2020-12-13 NOTE — PROGRESS NOTES
Postop day #2    BP issues overnight, now controlled with Hytrin. Blood pressure 135/83, pulse 95, temperature 98.8 °F (37.1 °C), temperature source Oral, resp. rate 18, height 1.803 m (5' 11\"), weight 99.2 kg (218 lb 11.2 oz), SpO2 95 %.       Intake/O

## 2020-12-13 NOTE — PLAN OF CARE
Assumed care at 0730. A&OX4. Pain in abdomen level 4 with PCA and po Tylenol. Oxycodone started today with plan to dc PCA tomorrow. Tele - ST. On RA with adequate O2 sats. Advanced to Full Liquids. Midline incision dressing removed by Dr. Alexandre Rivas.   Late ordered and tolerated  - Evaluate effectiveness of GI medications  - Encourage mobilization and activity  - Obtain nutritional consult as needed  - Establish a toileting routine/schedule  - Consider collaborating with pharmacy to review patient's medicatio

## 2020-12-13 NOTE — PLAN OF CARE
Assumed care at 0730. A&OX4. RA with adequate Oxygen saturations. Tele - SR/. VU DC'd by Dr. Stevo Carlson. Tolerating Clear Liquids. Pt wishing to take po slow and stay on Clear Liquids. Bennett removed. Voiding adequately.   Midline abdominal incision with d nutritional consult as needed  - Establish a toileting routine/schedule  - Consider collaborating with pharmacy to review patient's medication profile  Outcome: Progressing  Goal: Maintains adequate nutritional intake (undernourished)  Description: Marcus Acosta

## 2020-12-14 ENCOUNTER — APPOINTMENT (OUTPATIENT)
Dept: CT IMAGING | Facility: HOSPITAL | Age: 50
DRG: 358 | End: 2020-12-14
Attending: SURGERY
Payer: COMMERCIAL

## 2020-12-14 VITALS
HEART RATE: 85 BPM | SYSTOLIC BLOOD PRESSURE: 105 MMHG | BODY MASS INDEX: 30.57 KG/M2 | HEIGHT: 71 IN | TEMPERATURE: 98 F | WEIGHT: 218.38 LBS | OXYGEN SATURATION: 97 % | RESPIRATION RATE: 16 BRPM | DIASTOLIC BLOOD PRESSURE: 68 MMHG

## 2020-12-14 PROCEDURE — 99232 SBSQ HOSP IP/OBS MODERATE 35: CPT | Performed by: HOSPITALIST

## 2020-12-14 PROCEDURE — 74175 CTA ABDOMEN W/CONTRAST: CPT | Performed by: SURGERY

## 2020-12-14 RX ORDER — POLYETHYLENE GLYCOL 3350 17 G/17G
17 POWDER, FOR SOLUTION ORAL DAILY
Qty: 7 EACH | Refills: 0 | Status: SHIPPED | OUTPATIENT
Start: 2020-12-15 | End: 2021-02-18

## 2020-12-14 RX ORDER — TERAZOSIN 10 MG/1
10 CAPSULE ORAL 2 TIMES DAILY
Qty: 60 CAPSULE | Refills: 0 | Status: SHIPPED | OUTPATIENT
Start: 2020-12-14 | End: 2021-01-25

## 2020-12-14 RX ORDER — OXYCODONE HYDROCHLORIDE 5 MG/1
5 TABLET ORAL EVERY 4 HOURS PRN
Qty: 30 TABLET | Refills: 0 | Status: SHIPPED | OUTPATIENT
Start: 2020-12-14 | End: 2021-02-18

## 2020-12-14 NOTE — PHYSICAL THERAPY NOTE
PHYSICAL THERAPY TREATMENT NOTE - INPATIENT    Room Number: 0406/9338-M     Session: 1   Number of Visits to Meet Established Goals: 5    Presenting Problem: Paraganglioma    Problem List  Active Problems:    Uncontrolled hypertension    Dyslipidemia    P room?: None   -   Climbing 3-5 steps with a railing?: A Little       AM-PAC Score:  Raw Score: 23   Approx Degree of Impairment: 11.2%   Standardized Score (AM-PAC Scale): 56.93   CMS Modifier (G-Code): CI    FUNCTIONAL ABILITY STATUS  Gait Assessment   Ga assistance level: independent  Met       Goal #4 Asc/desc 1 flight of stairs  Met    Goal #5     Goal #6     Goal Comments: Goals established on 12/12/2020. Goals met.  12/14  Dc approved by PT.

## 2020-12-14 NOTE — PROGRESS NOTES
[facility]  Progress Note     Jose Johnston Patient Status:  Inpatient    1970 MRN [id number]   [facility] 7NE-A Attending Talisha Espinoza MD   Hosp Day # 3 PCP Ted Condon MD     Chief Complaint: Medical Management     S hours.         Imaging: Imaging data reviewed in Epic.     Medications:   • PEG 3350  17 g Oral Daily   • simethicone  80 mg Oral TID CC and HS   • Terazosin HCl  10 mg Oral BID   • acetaminophen  1,000 mg Oral Q6H   • enoxaparin  40 mg Subcutaneous Daily

## 2020-12-14 NOTE — PROGRESS NOTES
Postop day #3    BP controlled this AM  Denies N/V, some bloating  + constipation  Pain controlled    Blood pressure 105/68, pulse 85, temperature 97.9 °F (36.6 °C), temperature source Oral, resp.  rate 16, height 1.803 m (5' 11\"), weight 99.1 kg (218 lb 6

## 2020-12-14 NOTE — PLAN OF CARE
Assumed care @ 1900. Patient alert oriented x4. On telemetry monitoring. Stated pain has been better  Updated patient with plan of care, verbalizes understanding. Kept patient NPO, for CT ABD. Ensures patient safety.   Maintained a calm and safe envi Interventions:  - Assess patient's functional ability and stability  - Promote increasing activity/tolerance for mobility and gait  - Educate and engage patient/family in tolerated activity level and precautions    Outcome: Progressing

## 2020-12-14 NOTE — PLAN OF CARE
Assumed care of patient at 0700  A/Ox4, RA, NSR/ST on tele  Dilaudid PCA d/c'd  Fluids d/c'd  Patient clear to be discharged from all standpoints  Patient did not have a BM yet, advised to continue taking laxative at home  Patient denies pain  Worked with ordered  - Obtain nutritional consult as needed  - Evaluate fluid balance  Outcome: Adequate for Discharge  Goal: Maintains or returns to baseline bowel function  Description: INTERVENTIONS:  - Assess bowel function  - Maintain adequate hydration with IV o

## 2020-12-15 ENCOUNTER — TELEPHONE (OUTPATIENT)
Dept: SURGERY | Facility: CLINIC | Age: 50
End: 2020-12-15

## 2020-12-15 NOTE — DISCHARGE SUMMARY
[facility]  Discharge Summary    [facility] Patient Status:  Inpatient    1970 MRN [id number]   [facility] 7NE-A Attending No att. providers found   [address] Day # 3 PCP Luis Fernando Feng MD     Date of Admission: 2020    Da 12/11/2020  1. Exploration of the retroperitoneum. 2.       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      Complications: None    Disposition: Home or Self Care    Discharge Condition: Good    Discharge Medications: Discharge M

## 2020-12-15 NOTE — TELEPHONE ENCOUNTER
Checked on patient since discharge from hospital.  Pt stated no nausea, vomiting, or fevers. Stated that incisional sites were good with no drainage, no redness, or swelling. Pain was controlled with medication.   Stated he was having adequate intake and

## 2020-12-21 ENCOUNTER — OFFICE VISIT (OUTPATIENT)
Dept: SURGERY | Facility: CLINIC | Age: 50
End: 2020-12-21

## 2020-12-21 VITALS
WEIGHT: 206.63 LBS | SYSTOLIC BLOOD PRESSURE: 151 MMHG | BODY MASS INDEX: 29 KG/M2 | RESPIRATION RATE: 16 BRPM | DIASTOLIC BLOOD PRESSURE: 88 MMHG | TEMPERATURE: 97 F | OXYGEN SATURATION: 98 % | HEART RATE: 106 BPM

## 2020-12-21 DIAGNOSIS — D44.7 PARAGANGLIOMA (HCC): Primary | ICD-10-CM

## 2020-12-21 PROCEDURE — 1111F DSCHRG MED/CURRENT MED MERGE: CPT | Performed by: PHYSICIAN ASSISTANT

## 2020-12-21 PROCEDURE — 3077F SYST BP >= 140 MM HG: CPT | Performed by: PHYSICIAN ASSISTANT

## 2020-12-21 PROCEDURE — 3079F DIAST BP 80-89 MM HG: CPT | Performed by: PHYSICIAN ASSISTANT

## 2020-12-21 PROCEDURE — 99024 POSTOP FOLLOW-UP VISIT: CPT | Performed by: PHYSICIAN ASSISTANT

## 2020-12-21 NOTE — PROGRESS NOTES
[address] [facility]        Patient Name:  Taurus Ryan   YOB: 1970   Gender:  Male   Appt Date:  12/21/2020   Provider:  Arvind Guardado MD/Jenna Leung MD Vital Signs:  /88 (BP Location: Right arm, Patient Position: Sitting, Cuff Size: large)   Pulse 106   Temp 97.3 °F (36.3 °C) (Tympanic)   Resp 16   Wt 93.7 kg (206 lb 9.6 oz)   SpO2 98%   BMI 28.81 kg/m²      Medications Reviewed:    Current Outpatie Doing well  Denies fever or chills  Denies chest pain or SOB  Denies abdominal pain or N/V  Denies diarrhea or constipation  -BP elevated, however, patient has not taken his BP medication yet      Physical Examination:  Constitutional: NAD.    Eyes: Sclera: [address]  [address] [place] [address],  Saeed Allred [facility], [address] [place] [address]  T: [phone]  F: [phone]  Pager [phone]

## 2020-12-28 ENCOUNTER — NURSE ONLY (OUTPATIENT)
Dept: SURGERY | Facility: CLINIC | Age: 50
End: 2020-12-28

## 2020-12-28 VITALS
RESPIRATION RATE: 16 BRPM | HEART RATE: 105 BPM | TEMPERATURE: 97 F | DIASTOLIC BLOOD PRESSURE: 72 MMHG | SYSTOLIC BLOOD PRESSURE: 153 MMHG

## 2020-12-28 NOTE — PROGRESS NOTES
Incision is clean, dry and intact. All staples removed and 5 steri strips applied. Pt instructed for steri strips to stay on 10-14 days. Gave pt phone number to RT and discussed the importance of him making appointment.   Pt also given 24 hour urine crissy

## 2021-01-15 ENCOUNTER — TELEPHONE (OUTPATIENT)
Dept: SURGERY | Facility: CLINIC | Age: 51
End: 2021-01-15

## 2021-01-15 NOTE — TELEPHONE ENCOUNTER
Called patient LVM about scheduling his radiation onc. Appt. He had not made yet.  Asked him to call us back

## 2021-01-18 ENCOUNTER — TELEPHONE (OUTPATIENT)
Dept: SURGERY | Facility: CLINIC | Age: 51
End: 2021-01-18

## 2021-01-18 NOTE — TELEPHONE ENCOUNTER
Called patient to tell him to make his radiation oncology appt with Dr. Darvin Smith he stated understanding and said he will call his as soon as we hung up and ellis lcall us and let us know when he is seeing them gave him Dr. Young Reasons office number

## 2021-01-19 ENCOUNTER — TELEPHONE (OUTPATIENT)
Dept: RADIATION ONCOLOGY | Facility: HOSPITAL | Age: 51
End: 2021-01-19

## 2021-01-19 NOTE — TELEPHONE ENCOUNTER
[facility] at [facility] location dx Paraganglioma referral from dr valeria [email or web address]

## 2021-01-25 ENCOUNTER — TELEPHONE (OUTPATIENT)
Dept: SURGERY | Facility: CLINIC | Age: 51
End: 2021-01-25

## 2021-01-25 RX ORDER — TERAZOSIN 10 MG/1
10 CAPSULE ORAL 2 TIMES DAILY
Qty: 60 CAPSULE | Refills: 0 | Status: SHIPPED | OUTPATIENT
Start: 2021-01-25 | End: 2021-02-24

## 2021-01-25 RX ORDER — TERAZOSIN 10 MG/1
CAPSULE ORAL
Qty: 180 CAPSULE | Refills: 0 | OUTPATIENT
Start: 2021-01-25

## 2021-01-25 NOTE — TELEPHONE ENCOUNTER
Pt needs rx refill   Terazosin HCl 10 MG Oral Cap [[id number]] (Order [id number])    Pharmacy   [place] [address] [address][id number] [facility], [address] [facility] [address] [place] [address], [phone], [phone]

## 2021-01-25 NOTE — TELEPHONE ENCOUNTER
Hypertension Medications Protocol Aaufvq8201/25/2021 01:16 PM   Appointment in past 6 or next 3 months Protocol Details    CMP or BMP in past 12 months           Due for px and labs  Future Appointments   Date Time Provider Brian Bass   1/29/2021  7:3

## 2021-01-25 NOTE — TELEPHONE ENCOUNTER
Hypertension Medications Protocol Aiwtpz4201/25/2021 01:40 PM   CMP or BMP in past 12 months Protocol Details    Last serum creatinine< 2.0           Future Appointments   Date Time Provider Brian Bass   1/29/2021  7:30 Valentina Santiago MD [facility] RAD

## 2021-01-25 NOTE — TELEPHONE ENCOUNTER
Last refilled on 12/14/20 for # 60 with 0 refills  Last labs BMP 12/14/20  Last OV 6/3/20  Future Appointments   Date Time Provider Brian Bass   1/29/2021  7:30 AM Laure Hartman MD [address] [place] [address]        Thank you.

## 2021-01-25 NOTE — TELEPHONE ENCOUNTER
Pt scheduled px and labs.  Needs refill sent due to only having one left and his appt is not until 2/5

## 2021-01-25 NOTE — TELEPHONE ENCOUNTER
Pt called and wanted to get BP medication refilled and stated he spoke with PCP and they stated to call our office. The medication was prescribed by Hospitalist so I informed pt it would be the PCP that would need to reorder medication.   Informed patient

## 2021-01-28 NOTE — PROGRESS NOTES
Nursing Consultation Note  Patient: Jose Galaviz  YOB: 1970  Age: 52year old  Radiation Oncologist: Dr. Hoda Reynoso  Referring Physician: Dr. Frederick Kiser (PCP)  Diagnosis: PARAGANGLIOMA  Consult Date: 1/29/2021      Chem Musculoskeletal: Negative. Skin: Negative. Allergic/Immunologic: Negative. Neurological: Negative. Hematological: Negative. Psychiatric/Behavioral: Negative.            Allergies:  No Known Allergies    Current Outpatient Medications   Medi irreversible electroporation/nanoknife retroperitoneal tumor, intraoperative ultrasound.        Social History    Socioeconomic History      Marital status:       Spouse name: Not on file      Number of children: 2      Years of education: Not on fi Special Diet: Not Asked        Back Care: Not Asked        Exercise: Yes          daily        Bike Helmet: Not Asked        Seat Belt: Not Asked        Self-Exams: Not Asked    Social History Narrative      , lives alone      Has 2 sons, live Ron Clemente (MD): Pt with multiple chronic medical hx, hospice resent 2/2 improved mental status. Pt does not meet sepsis criteria but is being treated for zosyn for E.coli likely 2/2 ESBL uti. Pt also sent here for possible colon CA seen at last visit at [facility]. Pt has poor PO intake, unclear if peg tube is being used. Will check placement, CT, abd pelvis, and likely admission.

## 2021-01-28 NOTE — CONSULTS
[address] [facility] Oncology New Consultation      Patient Name: Dara Alicea   MRN: [id number]  PCP: Jo Downey MD  Referring Physician: Karmen Zamora MD    Diagnosis: retroperitoneal paraganglioma    Reason for Consultation: conside Medical History  Paraganglioma  HTN    Past Surgical History  Nanoknife ablation  Appendectomy (20s)    Medications  Current Outpatient Medications   Medication Sig Dispense Refill   • Terazosin HCl 10 MG Oral Cap Take 1 capsule (10 mg total) by mouth 2 (t consideration of definitive radiotherapy. Plan:     I discussed the above clinical situation with the patient at the time of consultation.  I recommended a course of definitive external beam radiotherapy to his retroperitoneal tumor for the purpose of im

## 2021-01-29 ENCOUNTER — HOSPITAL ENCOUNTER (OUTPATIENT)
Dept: RADIATION ONCOLOGY | Facility: HOSPITAL | Age: 51
Discharge: HOME | End: 2021-01-29
Attending: INTERNAL MEDICINE
Payer: COMMERCIAL

## 2021-01-29 VITALS
SYSTOLIC BLOOD PRESSURE: 159 MMHG | BODY MASS INDEX: 29.51 KG/M2 | WEIGHT: 210.81 LBS | HEIGHT: 70.98 IN | RESPIRATION RATE: 16 BRPM | HEART RATE: 99 BPM | DIASTOLIC BLOOD PRESSURE: 91 MMHG | TEMPERATURE: 98 F | OXYGEN SATURATION: 97 %

## 2021-01-29 DIAGNOSIS — D44.7 PARAGANGLIOMA (HCC): ICD-10-CM

## 2021-01-29 PROCEDURE — 99214 OFFICE O/P EST MOD 30 MIN: CPT

## 2021-01-29 NOTE — PATIENT INSTRUCTIONS
- CALL CENTRAL SCHEDULING AT [phone] TO SCHEDULE YOUR MRI    - DR. HUNTER WILL CALL YOU AFTER YOUR MRI TO DISCUSS RESULTS    - IF YOU HAVE ANY QUESTIONS OR CONCERNS REGARDING RADIATION THERAPY, PLEASE CALL US AT [phone]

## 2021-02-04 ENCOUNTER — TELEPHONE (OUTPATIENT)
Dept: FAMILY MEDICINE CLINIC | Facility: CLINIC | Age: 51
End: 2021-02-04

## 2021-02-04 NOTE — TELEPHONE ENCOUNTER
Pt would like Dr. Herlinda Galindo to call him back to talk to him didn't want to tell me why canceled apt for 02/05/2021 due to him having to drive

## 2021-02-04 NOTE — TELEPHONE ENCOUNTER
FYI    Patient was unable to make his appointment tomorrow - wanted to let Dr. Tracie Pittman know  Pt canceled, states he is going to be out of town tomorrow. Pt was suppose to follow up after the surgery.

## 2021-02-18 ENCOUNTER — OFFICE VISIT (OUTPATIENT)
Dept: FAMILY MEDICINE CLINIC | Facility: CLINIC | Age: 51
End: 2021-02-18

## 2021-02-18 VITALS
OXYGEN SATURATION: 98 % | WEIGHT: 214 LBS | RESPIRATION RATE: 16 BRPM | HEART RATE: 99 BPM | DIASTOLIC BLOOD PRESSURE: 88 MMHG | TEMPERATURE: 97 F | BODY MASS INDEX: 30.64 KG/M2 | HEIGHT: 70 IN | SYSTOLIC BLOOD PRESSURE: 144 MMHG

## 2021-02-18 DIAGNOSIS — M54.50 CHRONIC LOW BACK PAIN, UNSPECIFIED BACK PAIN LATERALITY, UNSPECIFIED WHETHER SCIATICA PRESENT: ICD-10-CM

## 2021-02-18 DIAGNOSIS — M25.572 CHRONIC PAIN OF LEFT ANKLE: ICD-10-CM

## 2021-02-18 DIAGNOSIS — G89.29 CHRONIC LOW BACK PAIN, UNSPECIFIED BACK PAIN LATERALITY, UNSPECIFIED WHETHER SCIATICA PRESENT: ICD-10-CM

## 2021-02-18 DIAGNOSIS — R74.8 ELEVATED CK: ICD-10-CM

## 2021-02-18 DIAGNOSIS — R73.03 PREDIABETES: ICD-10-CM

## 2021-02-18 DIAGNOSIS — Z51.81 THERAPEUTIC DRUG MONITORING: ICD-10-CM

## 2021-02-18 DIAGNOSIS — E78.49 OTHER HYPERLIPIDEMIA: ICD-10-CM

## 2021-02-18 DIAGNOSIS — E78.5 DYSLIPIDEMIA: ICD-10-CM

## 2021-02-18 DIAGNOSIS — Z00.00 ANNUAL PHYSICAL EXAM: Primary | ICD-10-CM

## 2021-02-18 DIAGNOSIS — G89.29 CHRONIC PAIN OF LEFT ANKLE: ICD-10-CM

## 2021-02-18 PROCEDURE — 99396 PREV VISIT EST AGE 40-64: CPT | Performed by: FAMILY MEDICINE

## 2021-02-18 PROCEDURE — 3079F DIAST BP 80-89 MM HG: CPT | Performed by: FAMILY MEDICINE

## 2021-02-18 PROCEDURE — G0438 PPPS, INITIAL VISIT: HCPCS | Performed by: FAMILY MEDICINE

## 2021-02-18 PROCEDURE — 3008F BODY MASS INDEX DOCD: CPT | Performed by: FAMILY MEDICINE

## 2021-02-18 PROCEDURE — 3077F SYST BP >= 140 MM HG: CPT | Performed by: FAMILY MEDICINE

## 2021-02-18 RX ORDER — LISINOPRIL 40 MG/1
TABLET ORAL
Qty: 90 TABLET | Refills: 0 | Status: SHIPPED | OUTPATIENT
Start: 2021-02-18 | End: 2021-05-21

## 2021-02-18 RX ORDER — ALPRAZOLAM 0.5 MG/1
TABLET ORAL
Qty: 2 TABLET | Refills: 0 | Status: SHIPPED | OUTPATIENT
Start: 2021-02-18 | End: 2021-06-10

## 2021-02-18 NOTE — PROGRESS NOTES
Juhi Humphrey is a 52year old male who presents for a complete physical exam.   HPI:     C/O lightheadedness since terazosin added for BP control. Could be sitting or standing when this happens. No syncope.   Home BP wnl, Started on med by endocrinolo Lab Results   Component Value Date    ALT 50 12/12/2020    ALT 78 (H) 11/16/2020    ALT 57 09/06/2020     No results found for: PSA     Current Outpatient Medications   Medication Sig Dispense Refill   • lisinopril 40 MG Oral Tab TAKE 1 TABLET BY MOUTH Types: Cigars      Smokeless tobacco: Never Used      Tobacco comment: cigars occ    Alcohol use: Yes      Frequency: Monthly or less      Drinks per session: 1 or 2      Binge frequency: Never      Comment: occ    Drug use: No     Occ: daisha and nick Left ankle pain: refer to podiatry  Situational anxiety - xanax prn Side effects and therapeutic benefits of medication reviewed. Patient expresses understanding. Colonoscopy UTD  Pt's weight is Body mass index is 28.73 kg/m². , recommended cont low fat

## 2021-02-19 NOTE — PROGRESS NOTES
Update:   Spoke with pt. Discussed need to f/u with endo. Pt will complete 24 hour urine test and follow up with Dr. Shannon Diallo. Monitor BP daily.

## 2021-02-21 ENCOUNTER — HOSPITAL ENCOUNTER (OUTPATIENT)
Dept: MRI IMAGING | Facility: HOSPITAL | Age: 51
Discharge: HOME | End: 2021-02-21
Attending: INTERNAL MEDICINE
Payer: COMMERCIAL

## 2021-02-21 DIAGNOSIS — D44.7 PARAGANGLIOMA (HCC): ICD-10-CM

## 2021-02-21 PROCEDURE — A9575 INJ GADOTERATE MEGLUMI 0.1ML: HCPCS | Performed by: INTERNAL MEDICINE

## 2021-02-21 PROCEDURE — 74183 MRI ABD W/O CNTR FLWD CNTR: CPT | Performed by: INTERNAL MEDICINE

## 2021-02-23 ENCOUNTER — TELEPHONE (OUTPATIENT)
Dept: RADIATION ONCOLOGY | Facility: HOSPITAL | Age: 51
End: 2021-02-23

## 2021-02-23 ENCOUNTER — LAB ENCOUNTER (OUTPATIENT)
Dept: LAB | Age: 51
End: 2021-02-23
Attending: INTERNAL MEDICINE
Payer: COMMERCIAL

## 2021-02-23 DIAGNOSIS — D44.7 PARAGANGLIOMA (HCC): ICD-10-CM

## 2021-02-23 LAB
CREAT UR-SCNC: 1.21 G/24 HR (ref 0.95–2.49)
SPECIMEN VOL UR: 1000 ML

## 2021-02-23 PROCEDURE — 83835 ASSAY OF METANEPHRINES: CPT

## 2021-02-23 PROCEDURE — 82570 ASSAY OF URINE CREATININE: CPT

## 2021-02-23 PROCEDURE — 82384 ASSAY THREE CATECHOLAMINES: CPT

## 2021-02-23 NOTE — TELEPHONE ENCOUNTER
Called pt to discuss results of recent MRI. Procedure with Dr. Roger Garduno was able to shrink the tumor somewhat, but there is still significant residual disease. Reiterated recommendation of definitive RT for improved local control.   Patient would like to [facility]

## 2021-02-24 RX ORDER — TERAZOSIN 10 MG/1
CAPSULE ORAL
Qty: 60 CAPSULE | Refills: 0 | Status: SHIPPED | OUTPATIENT
Start: 2021-02-24 | End: 2021-03-22

## 2021-02-24 RX ORDER — AMLODIPINE BESYLATE 10 MG/1
TABLET ORAL
Qty: 90 TABLET | Refills: 1 | OUTPATIENT
Start: 2021-02-24

## 2021-02-24 NOTE — TELEPHONE ENCOUNTER
Hypertension Medications Protocol Icvqkb9902/24/2021 05:29 AM   CMP or BMP in past 12 months Protocol Details    Last serum creatinine< 2.0     Appointment in past 6 or next 3 months      Last OV 2/18/21  Last BMP 12/14/20  Last refill terazosin 1/2521 #60 0

## 2021-02-28 LAB
CREATININE, URINE - PER 24H: 1070 MG/D
CREATININE, URINE - PER VOLUME: 107 MG/DL
HOURS COLLECTED: 24 HR
METANEPHRINE, UR- RATIO TO CRT: 84 UG/G CRT
METANEPHRINE, URINE - PER 24H: 90 UG/D
METANEPHRINE, URINE-PER VOLUME: 90 UG/L
NORMETANEPHRINE, UR-PER VOLUME: 5169 UG/L
NORMETANEPHRINE, URINE - RATIO: 4831 UG/G CRT
NORMETANEPHRINE, URINE-PER 24H: 5169 UG/D
TOTAL VOLUME: 1000 ML

## 2021-03-01 NOTE — PROGRESS NOTES
Pt still has not rescheduled a visit. His paraganglioma labs are still abnormal.  Please ask him to come see me later this month in a hold spot on 3/19 or 3/26.   Thanks  EDWARD

## 2021-03-02 NOTE — PROGRESS NOTES
Telephone Information:  Mobile          [phone]    Patient informed of Dr. Shu Webster note. Patient verbalized understanding and agrees with plan.       Was making appointment with the pt for 3/19 and he ended the call

## 2021-03-03 LAB
CREATININE, URINE - PER 24H: 1080 MG/D
CREATININE, URINE - PER VOLUME: 108 MG/DL
DOPAMINE, URINE - PER 24H: 160 UG/D
DOPAMINE, URINE - PER VOLUME: 160 UG/L
DOPAMINE, URINE - RATIO TO CRT: 148 UG/G CRT
HOURS COLLECTED: 24 HR
NOREPINEPHRINE, URINE - PER 24: 654 UG/D
NOREPINEPHRINE, URINE - RATIO: 606 UG/G CRT
NOREPINEPHRINE, URINE-PER VOL: 654 UG/L
TOTAL VOLUME: 1000 ML

## 2021-03-03 NOTE — PROGRESS NOTES
Patient informed of Dr. Lorin Schwab note. Patient verbalized understanding and agrees with plan.     Future Appointments  3/19/2021  2:20 PM    Lyndsay Nguyen MD            [facility]

## 2021-03-18 ENCOUNTER — TELEPHONE (OUTPATIENT)
Dept: RADIATION ONCOLOGY | Facility: HOSPITAL | Age: 51
End: 2021-03-18

## 2021-03-18 NOTE — TELEPHONE ENCOUNTER
Called pt regarding decision for RT. States he does not want to pursue RT at this time, states will take out a lot of time from work. States he is feeling well, no c/o pain. Is following up with endocrinologist (Dr. Scotty Fleming) tomorrow. Dr. Maggie Day made aware. 6

## 2021-03-19 NOTE — PROGRESS NOTES
Dara Alicea is a 54year old male who presents for a complete physical exam.   HPI:       F/u on HTN. Taking 1/2 dose of lisinopril because ran out of meds. Not checking BP at home    Gets muscle cramps on and off.  Hx of elev CK levels for few year No results found for: PSA     Current Outpatient Medications   Medication Sig Dispense Refill   • Sildenafil Citrate 25 MG Oral Tab Take 1 tablet (25 mg total) by mouth daily as needed for Erectile Dysfunction.  30 tablet 0   • lisinopril 40 MG Oral Tab (Temporal)   Resp 20   Ht 71\"   Wt 206 lb (93.4 kg)   SpO2 99%   BMI 28.73 kg/m²   Body mass index is 28.73 kg/m².    GENERAL: well developed, well nourished,in no apparent distress  SKIN: no rashes  HEENT: atraumatic, normocephalic,ears and throat are magi No

## 2021-03-22 RX ORDER — TERAZOSIN 10 MG/1
CAPSULE ORAL
Qty: 180 CAPSULE | Refills: 0 | Status: SHIPPED | OUTPATIENT
Start: 2021-03-22 | End: 2021-08-03

## 2021-03-22 NOTE — TELEPHONE ENCOUNTER
Hypertension Medications Protocol [id number]/22/2021 04:31 PM   CMP or BMP in past 12 months Protocol Details    Last serum creatinine< 2.0     Appointment in past 6 or next 3 months      Last OV 2/18/21  Last BMP 12/14/20  Last refill 2/24/21 #60 o refill

## 2021-03-25 ENCOUNTER — TELEPHONE (OUTPATIENT)
Dept: SURGERY | Facility: CLINIC | Age: 51
End: 2021-03-25

## 2021-03-25 NOTE — TELEPHONE ENCOUNTER
Called patient to discuss tumor board recommendations including consult with medical oncology (Dr. Vivian Owen) to discuss alternative treatment option as patient does not wish to pursue RT at this time. LVM with call back number.

## 2021-03-27 ENCOUNTER — TELEPHONE (OUTPATIENT)
Dept: FAMILY MEDICINE CLINIC | Facility: CLINIC | Age: 51
End: 2021-03-27

## 2021-03-27 NOTE — TELEPHONE ENCOUNTER
: Liza Holcomb MD (Physician)        Show:Clear all  [x]Manual[]Template[]Copied    Added by:  [x]Kaur Webster MD    []Sendy for details  Send pt reminder to follow up with Dr. Alexandre Rivas        [facility] sent

## 2021-03-29 NOTE — TELEPHONE ENCOUNTER
Delivery Read From Message Type Attachments Subject   3/27/2021  8:34 AM Tyrel Rivera RN Patient Medical Advice Request  follow up

## 2021-03-30 ENCOUNTER — TELEPHONE (OUTPATIENT)
Dept: SURGERY | Facility: CLINIC | Age: 51
End: 2021-03-30

## 2021-03-30 NOTE — TELEPHONE ENCOUNTER
Spoke with patient and let him know that we wanted him to see Dr. Paola Cuevas regarding other treatment options since he was not comfortable with radiation. Gave patient Dr. Nancee Cabot office number. Pt stated understanding.

## 2021-05-20 ENCOUNTER — TELEPHONE (OUTPATIENT)
Dept: SURGERY | Facility: CLINIC | Age: 51
End: 2021-05-20

## 2021-05-20 NOTE — TELEPHONE ENCOUNTER
Pt called asking if he had labs due at this time. I let him know that I didn't have any lab work for him at this time, but his PCP has several orders that he needs to complete.   I also asked if he reached out to Dr. Anderson Pierson regarding any other options aly

## 2021-05-21 RX ORDER — LISINOPRIL 40 MG/1
TABLET ORAL
Qty: 90 TABLET | Refills: 0 | Status: SHIPPED | OUTPATIENT
Start: 2021-05-21 | End: 2021-08-03

## 2021-05-21 NOTE — TELEPHONE ENCOUNTER
LOV: 2/18/21 for annual  CMP: 12/12/20    lisinopril 40 MG Oral Tab 90 tablet 0 2/18/2021    Sig: Meghan Coleman 1 TABLET BY MOUTH EVERY DAY         Hypertension Medications Protocol Wdqkck1805/21/2021 02:00 PM   CMP or BMP in past 12 months Protocol Details    Last

## 2021-06-08 ENCOUNTER — TELEPHONE (OUTPATIENT)
Dept: HEMATOLOGY/ONCOLOGY | Facility: HOSPITAL | Age: 51
End: 2021-06-08

## 2021-06-08 ENCOUNTER — TELEPHONE (OUTPATIENT)
Dept: FAMILY MEDICINE CLINIC | Facility: CLINIC | Age: 51
End: 2021-06-08

## 2021-06-08 DIAGNOSIS — D44.7 PARAGANGLIOMA (HCC): Primary | ICD-10-CM

## 2021-06-08 NOTE — TELEPHONE ENCOUNTER
[address] called, states Pt needs a referral for Dr Maria Teresa Stahl, needs a total of 5 visits   Put in [address],   [phone]

## 2021-06-10 ENCOUNTER — OFFICE VISIT (OUTPATIENT)
Dept: HEMATOLOGY/ONCOLOGY | Age: 51
End: 2021-06-10
Attending: INTERNAL MEDICINE
Payer: COMMERCIAL

## 2021-06-10 VITALS
WEIGHT: 220 LBS | DIASTOLIC BLOOD PRESSURE: 89 MMHG | RESPIRATION RATE: 18 BRPM | OXYGEN SATURATION: 98 % | TEMPERATURE: 98 F | BODY MASS INDEX: 32 KG/M2 | HEART RATE: 86 BPM | SYSTOLIC BLOOD PRESSURE: 152 MMHG

## 2021-06-10 DIAGNOSIS — D44.7 PARAGANGLIOMA (HCC): Primary | ICD-10-CM

## 2021-06-10 DIAGNOSIS — I15.8 OTHER SECONDARY HYPERTENSION: ICD-10-CM

## 2021-06-10 PROCEDURE — 99205 OFFICE O/P NEW HI 60 MIN: CPT | Performed by: INTERNAL MEDICINE

## 2021-06-10 NOTE — PROGRESS NOTES
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 Patient had tumor resection on 12/11/20. Here to discuss treatment options. Patient had an MRI of the abdomen on 2/21.      Education Record    Learner:  Patient    Disease / Diagnosis:

## 2021-06-11 NOTE — CONSULTS
[facility]    PATIENT'S NAME: Holli Neely   CONSULTING PHYSICIAN: Cathryn Conroy M.D.    PATIENT ACCOUNT #: [de-identified] LOCATION: [address] [place] [address] RECORD #: [id number] YOB: 1970   CONSULTATION DATE: 06/10/2021       CANCER and then ultimately taken to Surgery on December 11 for exploration of the retroperitoneum. 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 This was unresectable, and ultimately he underwent irreversible electroporation of the tumor. He exercises regularly. He lives in [place]. FAMILY MEDICAL HISTORY:  Notable for his mother having  at the age of 62 of respiratory problems. His father  at 64 of what he thinks was cancer involving his brain though he has no details.   He redrawn today. His BUN is 15, his creatinine is slightly higher at 1.40, AST is 58, ALT is 77, his LDH is 259. His CBC is normal.    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 The patient's tumor does have a secretory component which is resulting in catechols being released.   It is possible that the use of a MRI results are back. Dictated By Fide Lucas M.D.  d: 06/10/2021 17:47:56  t: 06/11/2021 05:31:18  [facility] [id number]  NK/    cc: NILTON Galvez M.D. Garnell Lapping, M.D. Aditya A. Derril Links

## 2021-06-25 ENCOUNTER — HOSPITAL ENCOUNTER (OUTPATIENT)
Dept: MRI IMAGING | Facility: HOSPITAL | Age: 51
Discharge: HOME | End: 2021-06-25
Attending: INTERNAL MEDICINE
Payer: COMMERCIAL

## 2021-06-25 ENCOUNTER — TELEPHONE (OUTPATIENT)
Dept: HEMATOLOGY/ONCOLOGY | Facility: HOSPITAL | Age: 51
End: 2021-06-25

## 2021-06-25 DIAGNOSIS — D44.7 PARAGANGLIOMA (HCC): ICD-10-CM

## 2021-06-25 PROCEDURE — 72197 MRI PELVIS W/O & W/DYE: CPT | Performed by: INTERNAL MEDICINE

## 2021-06-25 PROCEDURE — 74183 MRI ABD W/O CNTR FLWD CNTR: CPT | Performed by: INTERNAL MEDICINE

## 2021-06-25 PROCEDURE — A9575 INJ GADOTERATE MEGLUMI 0.1ML: HCPCS | Performed by: INTERNAL MEDICINE

## 2021-06-25 NOTE — TELEPHONE ENCOUNTER
Left message for patient regarding MRI results. Slight growth. Will review in GI on Weds.   To call me back to discuss  A [place]

## 2021-06-30 ENCOUNTER — TELEPHONE (OUTPATIENT)
Dept: HEMATOLOGY/ONCOLOGY | Facility: HOSPITAL | Age: 51
End: 2021-06-30

## 2021-06-30 PROBLEM — D44.7 PARAGANGLIOMA (HCC): Status: ACTIVE | Noted: 2021-06-30

## 2021-06-30 NOTE — TELEPHONE ENCOUNTER
Spoke to patient. Some growth on MRI and case discussed at GI oncology conference. Will start with treatment with lanreotide. Plan to do this for 3-4 months and then repeat MRI. If stable, will continue. If not - will need treatment with Murrel Ohms.   A

## 2021-07-06 ENCOUNTER — TELEPHONE (OUTPATIENT)
Dept: HEMATOLOGY/ONCOLOGY | Age: 51
End: 2021-07-06

## 2021-07-09 ENCOUNTER — OFFICE VISIT (OUTPATIENT)
Dept: HEMATOLOGY/ONCOLOGY | Age: 51
End: 2021-07-09
Attending: INTERNAL MEDICINE
Payer: COMMERCIAL

## 2021-07-09 VITALS
OXYGEN SATURATION: 98 % | RESPIRATION RATE: 18 BRPM | HEART RATE: 85 BPM | TEMPERATURE: 98 F | DIASTOLIC BLOOD PRESSURE: 85 MMHG | BODY MASS INDEX: 32 KG/M2 | SYSTOLIC BLOOD PRESSURE: 165 MMHG | WEIGHT: 221 LBS

## 2021-07-09 DIAGNOSIS — D35.01 PHEOCHROMOCYTOMA OF RIGHT ADRENAL GLAND: ICD-10-CM

## 2021-07-09 DIAGNOSIS — D44.7 PARAGANGLIOMA (HCC): Primary | ICD-10-CM

## 2021-07-09 LAB
ALBUMIN SERPL-MCNC: 4.2 G/DL (ref 3.4–5)
ALBUMIN/GLOB SERPL: 1.2 {RATIO} (ref 1–2)
ALP LIVER SERPL-CCNC: 86 U/L
ALT SERPL-CCNC: 61 U/L
ANION GAP SERPL CALC-SCNC: 6 MMOL/L (ref 0–18)
AST SERPL-CCNC: 41 U/L (ref 15–37)
BASOPHILS # BLD AUTO: 0.09 X10(3) UL (ref 0–0.2)
BASOPHILS NFR BLD AUTO: 1 %
BILIRUB SERPL-MCNC: 0.4 MG/DL (ref 0.1–2)
BUN BLD-MCNC: 15 MG/DL (ref 7–18)
BUN/CREAT SERPL: 10.1 (ref 10–20)
CALCIUM BLD-MCNC: 9.3 MG/DL (ref 8.5–10.1)
CHLORIDE SERPL-SCNC: 110 MMOL/L (ref 98–112)
CO2 SERPL-SCNC: 24 MMOL/L (ref 21–32)
CREAT BLD-MCNC: 1.49 MG/DL
DEPRECATED RDW RBC AUTO: 39.6 FL (ref 35.1–46.3)
EOSINOPHIL # BLD AUTO: 0.54 X10(3) UL (ref 0–0.7)
EOSINOPHIL NFR BLD AUTO: 6.3 %
ERYTHROCYTE [DISTWIDTH] IN BLOOD BY AUTOMATED COUNT: 12.4 % (ref 11–15)
GLOBULIN PLAS-MCNC: 3.5 G/DL (ref 2.8–4.4)
GLUCOSE BLD-MCNC: 123 MG/DL (ref 70–99)
HCT VFR BLD AUTO: 40.5 %
HGB BLD-MCNC: 13.3 G/DL
IMM GRANULOCYTES # BLD AUTO: 0.04 X10(3) UL (ref 0–1)
IMM GRANULOCYTES NFR BLD: 0.5 %
LYMPHOCYTES # BLD AUTO: 3.19 X10(3) UL (ref 1–4)
LYMPHOCYTES NFR BLD AUTO: 37.1 %
M PROTEIN MFR SERPL ELPH: 7.7 G/DL (ref 6.4–8.2)
MCH RBC QN AUTO: 28.5 PG (ref 26–34)
MCHC RBC AUTO-ENTMCNC: 32.8 G/DL (ref 31–37)
MCV RBC AUTO: 86.7 FL
MONOCYTES # BLD AUTO: 0.51 X10(3) UL (ref 0.1–1)
MONOCYTES NFR BLD AUTO: 5.9 %
NEUTROPHILS # BLD AUTO: 4.23 X10 (3) UL (ref 1.5–7.7)
NEUTROPHILS # BLD AUTO: 4.23 X10(3) UL (ref 1.5–7.7)
NEUTROPHILS NFR BLD AUTO: 49.2 %
OSMOLALITY SERPL CALC.SUM OF ELEC: 292 MOSM/KG (ref 275–295)
PATIENT FASTING Y/N/NP: NO
PLATELET # BLD AUTO: 327 10(3)UL (ref 150–450)
POTASSIUM SERPL-SCNC: 3.9 MMOL/L (ref 3.5–5.1)
RBC # BLD AUTO: 4.67 X10(6)UL
SODIUM SERPL-SCNC: 140 MMOL/L (ref 136–145)
WBC # BLD AUTO: 8.6 X10(3) UL (ref 4–11)

## 2021-07-09 PROCEDURE — 96372 THER/PROPH/DIAG INJ SC/IM: CPT

## 2021-07-09 NOTE — PROGRESS NOTES
Education Record    Learner:  Patient    Disease / Diagnosis: neuroendocrine    Barriers / Limitations:  None   Comments:    Method:  Brief focused   Comments:    General Topics:  Plan of care reviewed   Comments:    Outcome:  Shows understanding   Comment

## 2021-08-03 RX ORDER — TERAZOSIN 10 MG/1
CAPSULE ORAL
Qty: 180 CAPSULE | Refills: 0 | Status: SHIPPED | OUTPATIENT
Start: 2021-08-03 | End: 2021-11-08

## 2021-08-03 RX ORDER — LISINOPRIL 40 MG/1
TABLET ORAL
Qty: 90 TABLET | Refills: 0 | Status: SHIPPED | OUTPATIENT
Start: 2021-08-03 | End: 2021-10-07

## 2021-08-03 NOTE — TELEPHONE ENCOUNTER
Hypertension Medications Protocol [id number]/03/2021 09:21 AM   CMP or BMP in past 12 months Protocol Details    Last serum creatinine< 2.0     Appointment in past 6 or next 3 months      OV 2/18/21  Refill 3/22/21 #180 0 refill

## 2021-08-03 NOTE — TELEPHONE ENCOUNTER
Hypertension Medications Protocol Tedxqf6708/03/2021 09:56 AM   CMP or BMP in past 12 months Protocol Details    Last serum creatinine< 2.0           OV 2/18/21  Last refill 5/21/21 #90 0 refill

## 2021-08-05 ENCOUNTER — OFFICE VISIT (OUTPATIENT)
Dept: HEMATOLOGY/ONCOLOGY | Age: 51
End: 2021-08-05
Attending: INTERNAL MEDICINE
Payer: COMMERCIAL

## 2021-08-05 VITALS
SYSTOLIC BLOOD PRESSURE: 151 MMHG | TEMPERATURE: 98 F | RESPIRATION RATE: 18 BRPM | WEIGHT: 222.19 LBS | DIASTOLIC BLOOD PRESSURE: 84 MMHG | OXYGEN SATURATION: 99 % | BODY MASS INDEX: 32 KG/M2 | HEART RATE: 85 BPM

## 2021-08-05 DIAGNOSIS — D44.7 PARAGANGLIOMA (HCC): ICD-10-CM

## 2021-08-05 DIAGNOSIS — D35.01 PHEOCHROMOCYTOMA OF RIGHT ADRENAL GLAND: ICD-10-CM

## 2021-08-05 DIAGNOSIS — D44.7 PARAGANGLIOMA (HCC): Primary | ICD-10-CM

## 2021-08-05 DIAGNOSIS — D35.01 PHEOCHROMOCYTOMA OF RIGHT ADRENAL GLAND: Primary | ICD-10-CM

## 2021-08-05 LAB
ALBUMIN SERPL-MCNC: 4.5 G/DL (ref 3.4–5)
ALBUMIN/GLOB SERPL: 1.3 {RATIO} (ref 1–2)
ALP LIVER SERPL-CCNC: 87 U/L
ALT SERPL-CCNC: 66 U/L
ANION GAP SERPL CALC-SCNC: 5 MMOL/L (ref 0–18)
AST SERPL-CCNC: 44 U/L (ref 15–37)
BASOPHILS # BLD AUTO: 0.09 X10(3) UL (ref 0–0.2)
BASOPHILS NFR BLD AUTO: 1.1 %
BILIRUB SERPL-MCNC: 0.6 MG/DL (ref 0.1–2)
BUN BLD-MCNC: 18 MG/DL (ref 7–18)
CALCIUM BLD-MCNC: 9.6 MG/DL (ref 8.5–10.1)
CHLORIDE SERPL-SCNC: 108 MMOL/L (ref 98–112)
CO2 SERPL-SCNC: 26 MMOL/L (ref 21–32)
CREAT BLD-MCNC: 1.51 MG/DL
EOSINOPHIL # BLD AUTO: 0.54 X10(3) UL (ref 0–0.7)
EOSINOPHIL NFR BLD AUTO: 6.4 %
ERYTHROCYTE [DISTWIDTH] IN BLOOD BY AUTOMATED COUNT: 12.5 %
GLOBULIN PLAS-MCNC: 3.6 G/DL (ref 2.8–4.4)
GLUCOSE BLD-MCNC: 142 MG/DL (ref 70–99)
HCT VFR BLD AUTO: 41.9 %
HGB BLD-MCNC: 14 G/DL
IMM GRANULOCYTES # BLD AUTO: 0.05 X10(3) UL (ref 0–1)
IMM GRANULOCYTES NFR BLD: 0.6 %
LYMPHOCYTES # BLD AUTO: 3.38 X10(3) UL (ref 1–4)
LYMPHOCYTES NFR BLD AUTO: 40.2 %
M PROTEIN MFR SERPL ELPH: 8.1 G/DL (ref 6.4–8.2)
MCH RBC QN AUTO: 29.4 PG (ref 26–34)
MCHC RBC AUTO-ENTMCNC: 33.4 G/DL (ref 31–37)
MCV RBC AUTO: 87.8 FL
MONOCYTES # BLD AUTO: 0.65 X10(3) UL (ref 0.1–1)
MONOCYTES NFR BLD AUTO: 7.7 %
NEUTROPHILS # BLD AUTO: 3.7 X10 (3) UL (ref 1.5–7.7)
NEUTROPHILS # BLD AUTO: 3.7 X10(3) UL (ref 1.5–7.7)
NEUTROPHILS NFR BLD AUTO: 44 %
OSMOLALITY SERPL CALC.SUM OF ELEC: 292 MOSM/KG (ref 275–295)
PATIENT FASTING Y/N/NP: NO
PLATELET # BLD AUTO: 273 10(3)UL (ref 150–450)
POTASSIUM SERPL-SCNC: 4.1 MMOL/L (ref 3.5–5.1)
RBC # BLD AUTO: 4.77 X10(6)UL
SODIUM SERPL-SCNC: 139 MMOL/L (ref 136–145)
WBC # BLD AUTO: 8.4 X10(3) UL (ref 4–11)

## 2021-08-05 PROCEDURE — 99214 OFFICE O/P EST MOD 30 MIN: CPT | Performed by: INTERNAL MEDICINE

## 2021-08-05 PROCEDURE — 96372 THER/PROPH/DIAG INJ SC/IM: CPT

## 2021-08-05 NOTE — PROGRESS NOTES
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     Barriers / Limitations:  None   Comments:    Method:  Discussion   Comments:    General Topics:  Plan of care reviewed   Comments:    Outcome:  Shows under

## 2021-08-05 NOTE — PROGRESS NOTES
Lanreotide given per MD order w/out incident. Pt dc home ambulatory in stable condition. Pt given 24hr urine drug with instructions.

## 2021-08-06 NOTE — PROGRESS NOTES
[facility]    PATIENT'S NAME: Roberta Marshall   ATTENDING PHYSICIAN: Fide Lucas M.D.    PATIENT ACCOUNT #: [de-identified] LOCATION: [address] [place] [address] RECORD #: [id number] YOB: 1970   DATE OF SERVICE: 08/05/2021       CANCER CE Performance status is 0. Weight 222 pounds, blood pressure 151/84, pulse 85, respiratory rate 20, temperature 97.7. HEENT:  Unremarkable. He has pink conjunctivae, anicteric sclerae. Pharynx is without lesions.   LYMPHATICS:  No cervical, supraclavicula

## 2021-09-02 ENCOUNTER — APPOINTMENT (OUTPATIENT)
Dept: HEMATOLOGY/ONCOLOGY | Age: 51
End: 2021-09-02
Attending: INTERNAL MEDICINE
Payer: COMMERCIAL

## 2021-09-07 ENCOUNTER — OFFICE VISIT (OUTPATIENT)
Dept: HEMATOLOGY/ONCOLOGY | Age: 51
End: 2021-09-07
Attending: INTERNAL MEDICINE
Payer: COMMERCIAL

## 2021-09-07 ENCOUNTER — TELEPHONE (OUTPATIENT)
Dept: FAMILY MEDICINE CLINIC | Facility: CLINIC | Age: 51
End: 2021-09-07

## 2021-09-07 VITALS
DIASTOLIC BLOOD PRESSURE: 70 MMHG | HEART RATE: 78 BPM | WEIGHT: 219.88 LBS | TEMPERATURE: 97 F | OXYGEN SATURATION: 99 % | RESPIRATION RATE: 18 BRPM | BODY MASS INDEX: 32 KG/M2 | SYSTOLIC BLOOD PRESSURE: 108 MMHG

## 2021-09-07 DIAGNOSIS — D44.7 PARAGANGLIOMA (HCC): Primary | ICD-10-CM

## 2021-09-07 DIAGNOSIS — Z51.11 ENCOUNTER FOR CHEMOTHERAPY MANAGEMENT: Primary | ICD-10-CM

## 2021-09-07 DIAGNOSIS — D44.7 PARAGANGLIOMA (HCC): ICD-10-CM

## 2021-09-07 DIAGNOSIS — D35.01 PHEOCHROMOCYTOMA OF RIGHT ADRENAL GLAND: ICD-10-CM

## 2021-09-07 DIAGNOSIS — T78.40XA ALLERGIC REACTION, INITIAL ENCOUNTER: ICD-10-CM

## 2021-09-07 DIAGNOSIS — I10 UNCONTROLLED HYPERTENSION: ICD-10-CM

## 2021-09-07 DIAGNOSIS — I15.8 OTHER SECONDARY HYPERTENSION: ICD-10-CM

## 2021-09-07 LAB
ALBUMIN SERPL-MCNC: 4.2 G/DL (ref 3.4–5)
ALBUMIN/GLOB SERPL: 1.2 {RATIO} (ref 1–2)
ALP LIVER SERPL-CCNC: 93 U/L
ALT SERPL-CCNC: 56 U/L
ANION GAP SERPL CALC-SCNC: 4 MMOL/L (ref 0–18)
AST SERPL-CCNC: 64 U/L (ref 15–37)
BASOPHILS # BLD AUTO: 0.03 X10(3) UL (ref 0–0.2)
BASOPHILS NFR BLD AUTO: 0.3 %
BILIRUB SERPL-MCNC: 0.7 MG/DL (ref 0.1–2)
BUN BLD-MCNC: 22 MG/DL (ref 7–18)
CALCIUM BLD-MCNC: 9.1 MG/DL (ref 8.5–10.1)
CHLORIDE SERPL-SCNC: 108 MMOL/L (ref 98–112)
CO2 SERPL-SCNC: 24 MMOL/L (ref 21–32)
CREAT BLD-MCNC: 1.59 MG/DL
EOSINOPHIL # BLD AUTO: 0.24 X10(3) UL (ref 0–0.7)
EOSINOPHIL NFR BLD AUTO: 2.6 %
ERYTHROCYTE [DISTWIDTH] IN BLOOD BY AUTOMATED COUNT: 12.8 %
GLOBULIN PLAS-MCNC: 3.6 G/DL (ref 2.8–4.4)
GLUCOSE BLD-MCNC: 144 MG/DL (ref 70–99)
HCT VFR BLD AUTO: 39.7 %
HGB BLD-MCNC: 13 G/DL
IMM GRANULOCYTES # BLD AUTO: 0.05 X10(3) UL (ref 0–1)
IMM GRANULOCYTES NFR BLD: 0.5 %
LYMPHOCYTES # BLD AUTO: 3.25 X10(3) UL (ref 1–4)
LYMPHOCYTES NFR BLD AUTO: 35.4 %
M PROTEIN MFR SERPL ELPH: 7.8 G/DL (ref 6.4–8.2)
MCH RBC QN AUTO: 29.1 PG (ref 26–34)
MCHC RBC AUTO-ENTMCNC: 32.7 G/DL (ref 31–37)
MCV RBC AUTO: 89 FL
MONOCYTES # BLD AUTO: 0.53 X10(3) UL (ref 0.1–1)
MONOCYTES NFR BLD AUTO: 5.8 %
NEUTROPHILS # BLD AUTO: 5.09 X10 (3) UL (ref 1.5–7.7)
NEUTROPHILS # BLD AUTO: 5.09 X10(3) UL (ref 1.5–7.7)
NEUTROPHILS NFR BLD AUTO: 55.4 %
OSMOLALITY SERPL CALC.SUM OF ELEC: 288 MOSM/KG (ref 275–295)
PATIENT FASTING Y/N/NP: NO
PLATELET # BLD AUTO: 290 10(3)UL (ref 150–450)
POTASSIUM SERPL-SCNC: 4.7 MMOL/L (ref 3.5–5.1)
RBC # BLD AUTO: 4.46 X10(6)UL
SODIUM SERPL-SCNC: 136 MMOL/L (ref 136–145)
WBC # BLD AUTO: 9.2 X10(3) UL (ref 4–11)

## 2021-09-07 PROCEDURE — 96372 THER/PROPH/DIAG INJ SC/IM: CPT

## 2021-09-07 PROCEDURE — 99214 OFFICE O/P EST MOD 30 MIN: CPT | Performed by: CLINICAL NURSE SPECIALIST

## 2021-09-07 NOTE — TELEPHONE ENCOUNTER
Call from patient. States made appt with Dr Leola Prieto. Has some non painful swelling in lip, elbow that happens occasionally. No difficulty breathing or swallowing. No SOB. Resolves on its own. No rash. No itching. No hives. Made appt tomorrow with Dr Leola Prieto.

## 2021-09-07 NOTE — PROGRESS NOTES
ANP Visit Note    Patient Name: Jose Means   YOB: 1970   Medical Record Number: [id number]   CSN: [id number]   Date of visit: 9/7/2021   Ryan Clancy MD   No primary care provider on file.      Chief Complaint/Reason for Visit:  Blaine Schwab ultrasound.        Allergies:  No Known Allergies    Family History:  Family History   Problem Relation Age of Onset   • Hypertension Mother    • Diabetes Mother    • Diabetes Brother    • Cancer Neg    • Lipids Neg    • Heart Disorder Neg    • Stroke Neg Social Connections:       Frequency of Communication with Friends and Family:       Frequency of Social Gatherings with Friends and Family:       Attends Restorationist Services:       Active Member of Clubs or Organizations:       Attends Club or [employer] Sodium 136 136 - 145 mmol/L    Potassium 4.7 3.5 - 5.1 mmol/L    Chloride 108 98 - 112 mmol/L    CO2 24.0 21.0 - 32.0 mmol/L    Anion Gap 4 0 - 18 mmol/L    BUN 22 (H) 7 - 18 mg/dL    Creatinine 1.59 (H) 0.70 - 1.30 mg/dL    Calcium, Total 9.1 8.5 - 10.1 m modification required, will continue to follow closely. Emotional Well Being:  I have assessed the patient's emotional well-being and any concerns about anxiety or depression.   We discussed issues of distress, coping difficulties and social support

## 2021-09-07 NOTE — PROGRESS NOTES
Outpatient Oncology Care Plan  Problem list:  knowledge deficit    Problems related to:    disease/disease progression  side effect of treatment    Interventions:  provided general teaching    Expected outcomes:  understands plan of care    Progress toward

## 2021-09-29 ENCOUNTER — OFFICE VISIT (OUTPATIENT)
Dept: FAMILY MEDICINE CLINIC | Facility: CLINIC | Age: 51
End: 2021-09-29

## 2021-09-29 VITALS
TEMPERATURE: 98 F | WEIGHT: 215 LBS | RESPIRATION RATE: 18 BRPM | SYSTOLIC BLOOD PRESSURE: 120 MMHG | DIASTOLIC BLOOD PRESSURE: 80 MMHG | OXYGEN SATURATION: 98 % | HEART RATE: 73 BPM | BODY MASS INDEX: 30.1 KG/M2 | HEIGHT: 71 IN

## 2021-09-29 DIAGNOSIS — I10 ESSENTIAL HYPERTENSION: ICD-10-CM

## 2021-09-29 DIAGNOSIS — T78.3XXA ANGIOEDEMA, INITIAL ENCOUNTER: Primary | ICD-10-CM

## 2021-09-29 PROCEDURE — 3074F SYST BP LT 130 MM HG: CPT | Performed by: FAMILY MEDICINE

## 2021-09-29 PROCEDURE — 3079F DIAST BP 80-89 MM HG: CPT | Performed by: FAMILY MEDICINE

## 2021-09-29 PROCEDURE — 3008F BODY MASS INDEX DOCD: CPT | Performed by: FAMILY MEDICINE

## 2021-09-29 PROCEDURE — 85025 COMPLETE CBC W/AUTO DIFF WBC: CPT | Performed by: FAMILY MEDICINE

## 2021-09-29 PROCEDURE — 80053 COMPREHEN METABOLIC PANEL: CPT | Performed by: FAMILY MEDICINE

## 2021-09-29 PROCEDURE — 85652 RBC SED RATE AUTOMATED: CPT | Performed by: FAMILY MEDICINE

## 2021-09-29 PROCEDURE — 86140 C-REACTIVE PROTEIN: CPT | Performed by: FAMILY MEDICINE

## 2021-09-29 PROCEDURE — 99215 OFFICE O/P EST HI 40 MIN: CPT | Performed by: FAMILY MEDICINE

## 2021-09-29 PROCEDURE — 86160 COMPLEMENT ANTIGEN: CPT | Performed by: FAMILY MEDICINE

## 2021-09-29 RX ORDER — PREDNISONE 20 MG/1
TABLET ORAL
Qty: 12 TABLET | Refills: 0 | Status: SHIPPED | OUTPATIENT
Start: 2021-09-29 | End: 2021-10-07 | Stop reason: ALTCHOICE

## 2021-09-29 NOTE — PROGRESS NOTES
Patient presents with:  Swelling       HPI:    Patient ID: Jose Johnston is a 54year old male presents with lip swelling starting today morning. Started with upper lip. Swelling spread to lower lip and right cheek over next few hours.  No swelling of t APPENDECTOMY     • OTHER  12/11/2020    Exploration retroperitoneum, irreversible electroporation/nanoknife retroperitoneal tumor, intraoperative ultrasound.       Family History   Problem Relation Age of Onset   • Hypertension Mother    • Diabetes Mother Behavior: Behavior normal.         Thought Content:  Thought content normal.         Judgment: Judgment normal.               ASSESSMENT/PLAN:   Angioedema, initial encounter  (primary encounter diagnosis)  Essential hypertension   Option of going to ER dis

## 2021-09-30 ENCOUNTER — TELEPHONE (OUTPATIENT)
Dept: FAMILY MEDICINE CLINIC | Facility: CLINIC | Age: 51
End: 2021-09-30

## 2021-09-30 NOTE — TELEPHONE ENCOUNTER
Lip swelling almost back to nl. Complete prednisone course  Discontinue lisinopril. Cont other 2 BP meds. Check BP BID x 3 days and call me on Monday.  Over weekend, if BP >160/100 - go to ER  Pt agrees and expresses understanding of plan

## 2021-09-30 NOTE — TELEPHONE ENCOUNTER
Per Dr. Ricki Weaver please get update on swelling and have the pt hold his lisinopril this AM      Spoke with the pt and asked about his swelling and he states that it is better then yesterday- it went down about 40%    Still having a Iittle swelling in cheeks an

## 2021-10-04 RX ORDER — AMLODIPINE BESYLATE 5 MG/1
5 TABLET ORAL DAILY
Qty: 30 TABLET | Refills: 0 | Status: SHIPPED | OUTPATIENT
Start: 2021-10-04

## 2021-10-04 NOTE — TELEPHONE ENCOUNTER
Call from patient. States he recently stopped lisinopril d/t lip swelling. Today /90 and 178/95. States no lip/facial swelling. No chest pain or SOB. No Headaches or dizziness. Still taking the metoprolol and terazosin.   Has appt with allergist

## 2021-10-05 RX ORDER — AMLODIPINE BESYLATE 5 MG/1
TABLET ORAL
Qty: 90 TABLET | Refills: 0 | OUTPATIENT
Start: 2021-10-05

## 2021-10-05 NOTE — TELEPHONE ENCOUNTER
Patient advised. Verbalized understanding. States still has some left over amlodipine 5mg at home, will take that. Advised to make sure not . Verbalized understanding. Also advised if sx worsen to any questions to call us.

## 2021-10-07 ENCOUNTER — OFFICE VISIT (OUTPATIENT)
Dept: HEMATOLOGY/ONCOLOGY | Age: 51
End: 2021-10-07
Attending: INTERNAL MEDICINE
Payer: COMMERCIAL

## 2021-10-07 VITALS
SYSTOLIC BLOOD PRESSURE: 106 MMHG | OXYGEN SATURATION: 98 % | RESPIRATION RATE: 18 BRPM | WEIGHT: 219.13 LBS | DIASTOLIC BLOOD PRESSURE: 67 MMHG | BODY MASS INDEX: 31 KG/M2 | TEMPERATURE: 98 F | HEART RATE: 73 BPM

## 2021-10-07 DIAGNOSIS — D35.01 PHEOCHROMOCYTOMA OF RIGHT ADRENAL GLAND: ICD-10-CM

## 2021-10-07 DIAGNOSIS — D44.7 PARAGANGLIOMA (HCC): Primary | ICD-10-CM

## 2021-10-07 DIAGNOSIS — I15.8 OTHER SECONDARY HYPERTENSION: Primary | ICD-10-CM

## 2021-10-07 DIAGNOSIS — D44.7 PARAGANGLIOMA (HCC): ICD-10-CM

## 2021-10-07 PROCEDURE — 99214 OFFICE O/P EST MOD 30 MIN: CPT | Performed by: INTERNAL MEDICINE

## 2021-10-07 PROCEDURE — 96372 THER/PROPH/DIAG INJ SC/IM: CPT

## 2021-10-07 RX ORDER — LORAZEPAM 1 MG/1
1 TABLET ORAL EVERY 6 HOURS PRN
Qty: 10 TABLET | Refills: 0 | Status: SHIPPED | OUTPATIENT
Start: 2021-10-07

## 2021-10-07 NOTE — PROGRESS NOTES
Patient is here for MD f/u for Paraganglioma. Patient started Lanreotide injections on 7/9 and has been tolerating well. He has been getting hives recently and was advised to discontinue Lisinopril because of it.  Patient has not had MRI done yet nor the 24

## 2021-10-19 NOTE — PROGRESS NOTES
[facility]    PATIENT'S NAME: Kamryn Cornel   ATTENDING PHYSICIAN: Mehreen Akins M.D.    PATIENT ACCOUNT #: [de-identified] LOCATION: [address] [place] [address] RECORD #: [id number] YOB: 1970   DATE OF SERVICE: 10/07/2021       CANCER CE in no acute distress. VITAL SIGNS:  His performance status is 0. His weight is 219 pounds. Blood pressure is much better at 106/67, pulse 73, respiratory rate is 20, temperature is 97.5. HEENT:  Unremarkable. LYMPHATICS:  He has no adenopathy.   CIERRA

## 2021-11-08 RX ORDER — LISINOPRIL 40 MG/1
TABLET ORAL
Qty: 90 TABLET | Refills: 0 | Status: SHIPPED | OUTPATIENT
Start: 2021-11-08 | End: 2022-02-03

## 2021-11-08 RX ORDER — TERAZOSIN 10 MG/1
CAPSULE ORAL
Qty: 180 CAPSULE | Refills: 0 | Status: SHIPPED | OUTPATIENT
Start: 2021-11-08 | End: 2022-02-03

## 2021-11-08 NOTE — TELEPHONE ENCOUNTER
LOV 9/29/21 for swelling. Hypertension Medications Protocol Passed 11/06/2021 01:28 PM   Protocol Details  CMP or BMP in past 12 months    Last serum creatinine< 2.0    Appointment in past 6 or next 3 months     No future appointments. Rx sent.

## 2021-11-10 RX ORDER — METOPROLOL TARTRATE 50 MG/1
100 TABLET, FILM COATED ORAL 2 TIMES DAILY
Qty: 360 TABLET | Refills: 0 | Status: SHIPPED | OUTPATIENT
Start: 2021-11-10 | End: 2022-02-03

## 2021-11-10 NOTE — TELEPHONE ENCOUNTER
Patient called requesting refill for:    METOPROLOL TARTRATE 50 MG Oral Tab 360 tablet     [id number] - [place] Carr, [address] [place] [address],[address] [facility]  AT [address] , [phone], [phone]    Please advise # [phone]

## 2021-11-16 ENCOUNTER — MED REC SCAN ONLY (OUTPATIENT)
Dept: FAMILY MEDICINE CLINIC | Facility: CLINIC | Age: 51
End: 2021-11-16

## 2022-02-03 RX ORDER — TERAZOSIN 10 MG/1
CAPSULE ORAL
Qty: 180 CAPSULE | Refills: 0 | Status: SHIPPED | OUTPATIENT
Start: 2022-02-03

## 2022-02-03 RX ORDER — LISINOPRIL 40 MG/1
TABLET ORAL
Qty: 90 TABLET | Refills: 0 | Status: SHIPPED | OUTPATIENT
Start: 2022-02-03

## 2022-02-03 RX ORDER — METOPROLOL TARTRATE 50 MG/1
TABLET, FILM COATED ORAL
Qty: 360 TABLET | Refills: 0 | Status: SHIPPED | OUTPATIENT
Start: 2022-02-03

## 2022-02-08 RX ORDER — TERAZOSIN 10 MG/1
CAPSULE ORAL
Qty: 180 CAPSULE | Refills: 0 | OUTPATIENT
Start: 2022-02-08

## 2022-02-08 NOTE — TELEPHONE ENCOUNTER
Last refill 2/3/22  Request refused  Sent to pharmacy as: Terazosin HCl 10 MG Oral Capsule (HYTRIN)    E-Prescribing Status: Receipt confirmed by pharmacy (2/3/2022 11:57 AM CST)

## 2022-05-09 RX ORDER — METOPROLOL TARTRATE 50 MG/1
100 TABLET, FILM COATED ORAL 2 TIMES DAILY
Qty: 120 TABLET | Refills: 0 | Status: SHIPPED | OUTPATIENT
Start: 2022-05-09

## 2022-05-09 RX ORDER — METOPROLOL TARTRATE 50 MG/1
TABLET, FILM COATED ORAL
Qty: 360 TABLET | Refills: 0 | OUTPATIENT
Start: 2022-05-09

## 2022-05-09 RX ORDER — METOPROLOL TARTRATE 50 MG/1
TABLET, FILM COATED ORAL
Qty: 120 TABLET | Refills: 0 | OUTPATIENT
Start: 2022-05-09

## 2022-05-09 NOTE — TELEPHONE ENCOUNTER
Last visit 09/29/2021  Last refill 02/03/2022  Last a1c 11/06/2020 Patient is due for a a1c. No Future appts.

## 2022-05-09 NOTE — TELEPHONE ENCOUNTER
This medication was being managed by endocrinology. Has not followed up with them?   Okay to give 30-day bridging if needed  Also due for annual physical and blood work here

## 2022-05-09 NOTE — TELEPHONE ENCOUNTER
Called pt. He has not seen ENDO for a while. Recommended he f/u there. Physical scheduled.     Future Appointments   Date Time Provider Brian Bass   5/20/2022  3:30 PM MD ANETTE Goncalves        Refill sent to a different pharmacy per pts request.

## 2022-06-18 RX ORDER — METOPROLOL TARTRATE 50 MG/1
TABLET, FILM COATED ORAL
Qty: 120 TABLET | Refills: 0 | Status: SHIPPED | OUTPATIENT
Start: 2022-06-18

## 2022-06-20 RX ORDER — METOPROLOL TARTRATE 50 MG/1
TABLET, FILM COATED ORAL
Qty: 360 TABLET | Refills: 0 | OUTPATIENT
Start: 2022-06-20

## 2022-06-21 NOTE — TELEPHONE ENCOUNTER
Hypertension Medications Protocol Failed 06/21/2022 09:42 AM   Protocol Details  Appointment in past 6 or next 3 months    CMP or BMP in past 12 months    Last serum creatinine< 2.0     Last OV 9/29/21  Last refill 2/3/22 #180 0 refill  No future appointments.    Due for Px

## 2022-06-22 RX ORDER — TERAZOSIN 10 MG/1
10 CAPSULE ORAL 2 TIMES DAILY
Qty: 180 CAPSULE | Refills: 0 | OUTPATIENT
Start: 2022-06-22

## 2022-06-24 RX ORDER — TERAZOSIN 10 MG/1
CAPSULE ORAL
Qty: 60 CAPSULE | Refills: 0 | Status: SHIPPED | OUTPATIENT
Start: 2022-06-24

## 2022-06-24 RX ORDER — TERAZOSIN 10 MG/1
CAPSULE ORAL
Qty: 180 CAPSULE | Refills: 0 | OUTPATIENT
Start: 2022-06-24

## 2022-06-24 NOTE — TELEPHONE ENCOUNTER
Pt called scheduled apt   Future Appointments   Date Time Provider Brian Shelia   7/11/2022  3:30 PM Ryan Valdivia MD [facility]     Per pt he going to be out before this he would like a bridging

## 2022-06-24 NOTE — TELEPHONE ENCOUNTER
Hypertension Medications Protocol Passed 06/24/2022 02:23 PM   Protocol Details  CMP or BMP in past 12 months    Last serum creatinine< 2.0    Appointment in past 6 or next 3 months       30 day bridging sent per protocol

## 2022-07-11 ENCOUNTER — OFFICE VISIT (OUTPATIENT)
Dept: FAMILY MEDICINE CLINIC | Facility: CLINIC | Age: 52
End: 2022-07-11
Payer: COMMERCIAL

## 2022-07-11 VITALS
WEIGHT: 218 LBS | HEIGHT: 71 IN | TEMPERATURE: 98 F | HEART RATE: 72 BPM | OXYGEN SATURATION: 98 % | SYSTOLIC BLOOD PRESSURE: 120 MMHG | RESPIRATION RATE: 18 BRPM | BODY MASS INDEX: 30.52 KG/M2 | DIASTOLIC BLOOD PRESSURE: 80 MMHG

## 2022-07-11 DIAGNOSIS — D44.7 PARAGANGLIOMA (HCC): ICD-10-CM

## 2022-07-11 DIAGNOSIS — R74.8 ELEVATED CK: ICD-10-CM

## 2022-07-11 DIAGNOSIS — R73.03 PREDIABETES: ICD-10-CM

## 2022-07-11 DIAGNOSIS — I10 ESSENTIAL HYPERTENSION: ICD-10-CM

## 2022-07-11 DIAGNOSIS — E78.5 DYSLIPIDEMIA: ICD-10-CM

## 2022-07-11 DIAGNOSIS — Z00.00 ANNUAL PHYSICAL EXAM: Primary | ICD-10-CM

## 2022-07-11 DIAGNOSIS — Z51.81 THERAPEUTIC DRUG MONITORING: ICD-10-CM

## 2022-07-11 PROCEDURE — G0438 PPPS, INITIAL VISIT: HCPCS | Performed by: FAMILY MEDICINE

## 2022-07-11 PROCEDURE — 3079F DIAST BP 80-89 MM HG: CPT | Performed by: FAMILY MEDICINE

## 2022-07-11 PROCEDURE — 3008F BODY MASS INDEX DOCD: CPT | Performed by: FAMILY MEDICINE

## 2022-07-11 PROCEDURE — 99396 PREV VISIT EST AGE 40-64: CPT | Performed by: FAMILY MEDICINE

## 2022-07-11 PROCEDURE — 3074F SYST BP LT 130 MM HG: CPT | Performed by: FAMILY MEDICINE

## 2022-07-19 ENCOUNTER — TELEPHONE (OUTPATIENT)
Dept: FAMILY MEDICINE CLINIC | Facility: CLINIC | Age: 52
End: 2022-07-19

## 2022-07-19 ENCOUNTER — HOSPITAL ENCOUNTER (OUTPATIENT)
Dept: MRI IMAGING | Facility: HOSPITAL | Age: 52
Discharge: HOME | End: 2022-07-19
Attending: INTERNAL MEDICINE
Payer: COMMERCIAL

## 2022-07-19 PROCEDURE — 74183 MRI ABD W/O CNTR FLWD CNTR: CPT | Performed by: INTERNAL MEDICINE

## 2022-07-19 PROCEDURE — A9575 INJ GADOTERATE MEGLUMI 0.1ML: HCPCS | Performed by: INTERNAL MEDICINE

## 2022-07-19 RX ORDER — ALPRAZOLAM 0.25 MG/1
0.25 TABLET ORAL ONCE
Qty: 2 TABLET | Refills: 0 | Status: SHIPPED | OUTPATIENT
Start: 2022-07-19 | End: 2022-07-19

## 2022-07-19 NOTE — TELEPHONE ENCOUNTER
Call from patient  Having MRI done today that Dr Carrie Joshi ordered  Wants to know if medication can be sent to calm him down during MRI  Routing to covering provider    Future Appointments   Date Time Provider Brian Bass   7/19/2022  6:15 PM [facility] MR RM3 (3T WIDE) [facility] MRI [facility] AT [facility]

## 2022-07-19 NOTE — TELEPHONE ENCOUNTER
Med sent  Take 1 tab 30 mins before procedure if still feel anxious in 15 mins can repeat dose.   Do not drive on this meds

## 2022-07-19 NOTE — TELEPHONE ENCOUNTER
Pt has a MRI today and would like  To send a medication to help clam him down.  Let pt know Dr. Catherine Baker is not in office today

## 2022-07-20 ENCOUNTER — TELEPHONE (OUTPATIENT)
Dept: HEMATOLOGY/ONCOLOGY | Facility: HOSPITAL | Age: 52
End: 2022-07-20

## 2022-07-20 NOTE — TELEPHONE ENCOUNTER
Spoke with patient. Has not been in to see Dr Shady Cottrell for a follow up. Also has not gotten Lanreotide injections. Patient thought he was only getting 4 injections and believed to be complete with his treatment. Informed patient he is overdue for injection and follow up. Scheduled him to come in on 8/2 to see MD and restart injections.

## 2022-07-24 RX ORDER — TERAZOSIN 10 MG/1
10 CAPSULE ORAL 2 TIMES DAILY
Qty: 180 CAPSULE | Refills: 0 | Status: SHIPPED | OUTPATIENT
Start: 2022-07-24 | End: 2022-10-22

## 2022-07-24 RX ORDER — METOPROLOL TARTRATE 50 MG/1
100 TABLET, FILM COATED ORAL 2 TIMES DAILY
Qty: 360 TABLET | Refills: 0 | Status: SHIPPED | OUTPATIENT
Start: 2022-07-24 | End: 2022-10-22

## 2022-07-24 NOTE — TELEPHONE ENCOUNTER
Hypertension Medications Protocol Passed 07/23/2022 12:41 PM   Protocol Details  CMP or BMP in past 12 months    Last serum creatinine< 2.0    Appointment in past 6 or next 3 months     Creatinine   0.70 - 1.30 mg/dL 1.29    10/7/21  LOV   7/11/22  BP Readings from Last 3 Encounters:  07/11/22 : 120/80  10/07/21 : 106/67  09/29/21 : 120/80    Refilled per protocol

## 2022-08-02 ENCOUNTER — OFFICE VISIT (OUTPATIENT)
Dept: HEMATOLOGY/ONCOLOGY | Facility: HOSPITAL | Age: 52
End: 2022-08-02
Attending: INTERNAL MEDICINE
Payer: COMMERCIAL

## 2022-08-02 VITALS
WEIGHT: 225 LBS | HEART RATE: 101 BPM | OXYGEN SATURATION: 98 % | TEMPERATURE: 99 F | BODY MASS INDEX: 31 KG/M2 | RESPIRATION RATE: 18 BRPM | DIASTOLIC BLOOD PRESSURE: 107 MMHG | SYSTOLIC BLOOD PRESSURE: 181 MMHG

## 2022-08-02 DIAGNOSIS — D35.01 PHEOCHROMOCYTOMA OF RIGHT ADRENAL GLAND: ICD-10-CM

## 2022-08-02 DIAGNOSIS — D44.7 PARAGANGLIOMA (HCC): ICD-10-CM

## 2022-08-02 DIAGNOSIS — D35.01 PHEOCHROMOCYTOMA OF RIGHT ADRENAL GLAND: Primary | ICD-10-CM

## 2022-08-02 DIAGNOSIS — D44.7 PARAGANGLIOMA (HCC): Primary | ICD-10-CM

## 2022-08-02 LAB
ALBUMIN SERPL-MCNC: 4.2 G/DL (ref 3.4–5)
ALBUMIN/GLOB SERPL: 1.1 {RATIO} (ref 1–2)
ALP LIVER SERPL-CCNC: 92 U/L
ALT SERPL-CCNC: 66 U/L
ANION GAP SERPL CALC-SCNC: 7 MMOL/L (ref 0–18)
AST SERPL-CCNC: 45 U/L (ref 15–37)
BASOPHILS # BLD AUTO: 0.1 X10(3) UL (ref 0–0.2)
BASOPHILS NFR BLD AUTO: 1.1 %
BILIRUB SERPL-MCNC: 0.5 MG/DL (ref 0.1–2)
BUN BLD-MCNC: 13 MG/DL (ref 7–18)
CALCIUM BLD-MCNC: 9.7 MG/DL (ref 8.5–10.1)
CHLORIDE SERPL-SCNC: 110 MMOL/L (ref 98–112)
CO2 SERPL-SCNC: 25 MMOL/L (ref 21–32)
CREAT BLD-MCNC: 1.51 MG/DL
EOSINOPHIL # BLD AUTO: 0.14 X10(3) UL (ref 0–0.7)
EOSINOPHIL NFR BLD AUTO: 1.5 %
ERYTHROCYTE [DISTWIDTH] IN BLOOD BY AUTOMATED COUNT: 12.2 %
FASTING STATUS PATIENT QL REPORTED: NO
GFR SERPLBLD BASED ON 1.73 SQ M-ARVRAT: 56 ML/MIN/1.73M2 (ref 60–?)
GLOBULIN PLAS-MCNC: 3.8 G/DL (ref 2.8–4.4)
GLUCOSE BLD-MCNC: 124 MG/DL (ref 70–99)
HCT VFR BLD AUTO: 43.6 %
HGB BLD-MCNC: 14.5 G/DL
IMM GRANULOCYTES # BLD AUTO: 0.04 X10(3) UL (ref 0–1)
IMM GRANULOCYTES NFR BLD: 0.4 %
LDH SERPL L TO P-CCNC: 255 U/L
LYMPHOCYTES # BLD AUTO: 3.7 X10(3) UL (ref 1–4)
LYMPHOCYTES NFR BLD AUTO: 38.9 %
MCH RBC QN AUTO: 29.5 PG (ref 26–34)
MCHC RBC AUTO-ENTMCNC: 33.3 G/DL (ref 31–37)
MCV RBC AUTO: 88.8 FL
MONOCYTES # BLD AUTO: 0.67 X10(3) UL (ref 0.1–1)
MONOCYTES NFR BLD AUTO: 7.1 %
NEUTROPHILS # BLD AUTO: 4.85 X10 (3) UL (ref 1.5–7.7)
NEUTROPHILS # BLD AUTO: 4.85 X10(3) UL (ref 1.5–7.7)
NEUTROPHILS NFR BLD AUTO: 51 %
OSMOLALITY SERPL CALC.SUM OF ELEC: 296 MOSM/KG (ref 275–295)
PLATELET # BLD AUTO: 292 10(3)UL (ref 150–450)
POTASSIUM SERPL-SCNC: 4.1 MMOL/L (ref 3.5–5.1)
PROT SERPL-MCNC: 8 G/DL (ref 6.4–8.2)
RBC # BLD AUTO: 4.91 X10(6)UL
SODIUM SERPL-SCNC: 142 MMOL/L (ref 136–145)
WBC # BLD AUTO: 9.5 X10(3) UL (ref 4–11)

## 2022-08-02 PROCEDURE — 36415 COLL VENOUS BLD VENIPUNCTURE: CPT

## 2022-08-02 PROCEDURE — 83615 LACTATE (LD) (LDH) ENZYME: CPT | Performed by: INTERNAL MEDICINE

## 2022-08-02 PROCEDURE — 96372 THER/PROPH/DIAG INJ SC/IM: CPT

## 2022-08-02 PROCEDURE — 80053 COMPREHEN METABOLIC PANEL: CPT | Performed by: INTERNAL MEDICINE

## 2022-08-02 PROCEDURE — 85025 COMPLETE CBC W/AUTO DIFF WBC: CPT | Performed by: INTERNAL MEDICINE

## 2022-08-02 RX ORDER — LANREOTIDE ACETATE 120 MG/.5ML
120 INJECTION SUBCUTANEOUS ONCE
Status: CANCELLED | OUTPATIENT
Start: 2022-08-02

## 2022-08-02 RX ORDER — LANREOTIDE ACETATE 120 MG/.5ML
120 INJECTION SUBCUTANEOUS ONCE
OUTPATIENT
Start: 2022-08-11

## 2022-08-02 RX ORDER — LANREOTIDE ACETATE 120 MG/.5ML
120 INJECTION SUBCUTANEOUS ONCE
Status: COMPLETED | OUTPATIENT
Start: 2022-08-02 | End: 2022-08-02

## 2022-08-02 RX ADMIN — LANREOTIDE ACETATE 120 MG: 120 INJECTION SUBCUTANEOUS at 16:28:00

## 2022-08-02 NOTE — PROGRESS NOTES
Patient is here for MD follow up for Pheochromocytoma of adrenal gland. Patient is here to restart Lanreotide injections. Patient had a MRI of the abdomen on 7/19. B/p elevated today. He did not take b/p meds last night or this morning. Patient states b/p is usually normal when he checks it at home. No GI complaints. Labs drawn today.          Education Record    Learner:  Patient    Disease / Diagnosis:  Pheochromocytoma of adrenal gland    Barriers / Limitations:  None   Comments:    Method:  Discussion   Comments:    General Topics:  Plan of care reviewed   Comments:    Outcome:  Shows understanding   Comments:

## 2022-08-02 NOTE — PROGRESS NOTES
Education Record    Learner:  Patient    Disease / Diagnosis:  pheochromocytoma    Barriers / Limitations:  None   Comments:    Method:  Brief focused and Discussion   Comments:    General Topics:  Medication, Side effects and symptom management and Plan of care reviewed   Comments:    Outcome:  Shows understanding   Comments:

## 2022-08-30 ENCOUNTER — APPOINTMENT (OUTPATIENT)
Dept: HEMATOLOGY/ONCOLOGY | Facility: HOSPITAL | Age: 52
End: 2022-08-30
Attending: INTERNAL MEDICINE
Payer: COMMERCIAL

## 2022-09-02 ENCOUNTER — OFFICE VISIT (OUTPATIENT)
Dept: HEMATOLOGY/ONCOLOGY | Facility: HOSPITAL | Age: 52
End: 2022-09-02
Attending: INTERNAL MEDICINE
Payer: COMMERCIAL

## 2022-09-02 DIAGNOSIS — D44.7 PARAGANGLIOMA (HCC): Primary | ICD-10-CM

## 2022-09-02 DIAGNOSIS — D35.01 PHEOCHROMOCYTOMA OF RIGHT ADRENAL GLAND: ICD-10-CM

## 2022-09-02 PROCEDURE — 96372 THER/PROPH/DIAG INJ SC/IM: CPT

## 2022-09-02 RX ORDER — LANREOTIDE ACETATE 120 MG/.5ML
120 INJECTION SUBCUTANEOUS ONCE
OUTPATIENT
Start: 2022-09-27

## 2022-09-02 RX ORDER — LANREOTIDE ACETATE 120 MG/.5ML
120 INJECTION SUBCUTANEOUS ONCE
Status: COMPLETED | OUTPATIENT
Start: 2022-09-02 | End: 2022-09-02

## 2022-09-02 RX ADMIN — LANREOTIDE ACETATE 120 MG: 120 INJECTION SUBCUTANEOUS at 15:30:00

## 2022-09-02 NOTE — PROGRESS NOTES
Education Record    Learner:  Patient    Disease / Diagnosis: Lanreotide    Barriers / Limitations:  None   Comments:    Method:  Brief focused and Reinforcement   Comments:    General Topics:  Diet, Medication, Side effects and symptom management and Plan of care reviewed   Comments:    Outcome:  Shows understanding   Comments: Patient tolerated injection and discharged in stable condition.

## 2022-09-26 ENCOUNTER — TELEPHONE (OUTPATIENT)
Dept: FAMILY MEDICINE CLINIC | Facility: CLINIC | Age: 52
End: 2022-09-26

## 2022-09-26 NOTE — TELEPHONE ENCOUNTER
Overdue labs  Letter sent  Future Appointments   Date Time Provider Brian Bass   10/3/2022  3:30 PM Kade Faria [address] [place] [address]   11/1/2022  3:30 PM Agusto Craig MD [address] [place] [address]   11/1/2022  4:00 PM [address] [place] [address]

## 2022-09-30 ENCOUNTER — APPOINTMENT (OUTPATIENT)
Dept: HEMATOLOGY/ONCOLOGY | Facility: HOSPITAL | Age: 52
End: 2022-09-30
Attending: INTERNAL MEDICINE
Payer: COMMERCIAL

## 2022-10-03 ENCOUNTER — OFFICE VISIT (OUTPATIENT)
Dept: HEMATOLOGY/ONCOLOGY | Facility: HOSPITAL | Age: 52
End: 2022-10-03
Attending: INTERNAL MEDICINE
Payer: COMMERCIAL

## 2022-10-03 DIAGNOSIS — D44.7 PARAGANGLIOMA (HCC): Primary | ICD-10-CM

## 2022-10-03 DIAGNOSIS — D35.01 PHEOCHROMOCYTOMA OF RIGHT ADRENAL GLAND: ICD-10-CM

## 2022-10-03 PROCEDURE — 96372 THER/PROPH/DIAG INJ SC/IM: CPT

## 2022-10-03 RX ORDER — LANREOTIDE ACETATE 120 MG/.5ML
120 INJECTION SUBCUTANEOUS ONCE
OUTPATIENT
Start: 2022-10-28

## 2022-10-03 RX ORDER — LANREOTIDE ACETATE 120 MG/.5ML
120 INJECTION SUBCUTANEOUS ONCE
Status: COMPLETED | OUTPATIENT
Start: 2022-10-03 | End: 2022-10-03

## 2022-10-03 RX ADMIN — LANREOTIDE ACETATE 120 MG: 120 INJECTION SUBCUTANEOUS at 15:21:00

## 2022-10-03 NOTE — PROGRESS NOTES
Education Record    Learner:  Patient    Disease / Diagnosis: Lanreotide    Barriers / Limitations:  None   Comments:    Method:  Discussion   Comments:    General Topics:  Plan of care reviewed   Comments:    Outcome:  Shows understanding   Comments:

## 2022-10-18 RX ORDER — TERAZOSIN 10 MG/1
CAPSULE ORAL
Qty: 180 CAPSULE | Refills: 0 | Status: SHIPPED | OUTPATIENT
Start: 2022-10-18

## 2022-10-18 RX ORDER — METOPROLOL TARTRATE 50 MG/1
TABLET, FILM COATED ORAL
Qty: 360 TABLET | Refills: 0 | Status: SHIPPED | OUTPATIENT
Start: 2022-10-18

## 2022-11-01 ENCOUNTER — OFFICE VISIT (OUTPATIENT)
Dept: HEMATOLOGY/ONCOLOGY | Facility: HOSPITAL | Age: 52
End: 2022-11-01
Attending: INTERNAL MEDICINE
Payer: COMMERCIAL

## 2022-11-01 VITALS
TEMPERATURE: 98 F | RESPIRATION RATE: 18 BRPM | DIASTOLIC BLOOD PRESSURE: 107 MMHG | SYSTOLIC BLOOD PRESSURE: 186 MMHG | HEART RATE: 84 BPM | OXYGEN SATURATION: 98 % | WEIGHT: 226 LBS | BODY MASS INDEX: 32 KG/M2

## 2022-11-01 DIAGNOSIS — I15.8 OTHER SECONDARY HYPERTENSION: ICD-10-CM

## 2022-11-01 DIAGNOSIS — D35.01 PHEOCHROMOCYTOMA OF RIGHT ADRENAL GLAND: Primary | ICD-10-CM

## 2022-11-01 DIAGNOSIS — D44.7 PARAGANGLIOMA (HCC): ICD-10-CM

## 2022-11-01 LAB
ALBUMIN SERPL-MCNC: 4.2 G/DL (ref 3.4–5)
ALBUMIN/GLOB SERPL: 1 {RATIO} (ref 1–2)
ALP LIVER SERPL-CCNC: 102 U/L
ALT SERPL-CCNC: 91 U/L
ANION GAP SERPL CALC-SCNC: 4 MMOL/L (ref 0–18)
AST SERPL-CCNC: 84 U/L (ref 15–37)
BASOPHILS # BLD AUTO: 0.06 X10(3) UL (ref 0–0.2)
BASOPHILS NFR BLD AUTO: 0.7 %
BILIRUB SERPL-MCNC: 0.5 MG/DL (ref 0.1–2)
BUN BLD-MCNC: 15 MG/DL (ref 7–18)
CALCIUM BLD-MCNC: 9.5 MG/DL (ref 8.5–10.1)
CHLORIDE SERPL-SCNC: 109 MMOL/L (ref 98–112)
CO2 SERPL-SCNC: 26 MMOL/L (ref 21–32)
CREAT BLD-MCNC: 1.32 MG/DL
EOSINOPHIL # BLD AUTO: 0.25 X10(3) UL (ref 0–0.7)
EOSINOPHIL NFR BLD AUTO: 2.8 %
ERYTHROCYTE [DISTWIDTH] IN BLOOD BY AUTOMATED COUNT: 12.7 %
FASTING STATUS PATIENT QL REPORTED: NO
GFR SERPLBLD BASED ON 1.73 SQ M-ARVRAT: 66 ML/MIN/1.73M2 (ref 60–?)
GLOBULIN PLAS-MCNC: 4.1 G/DL (ref 2.8–4.4)
GLUCOSE BLD-MCNC: 132 MG/DL (ref 70–99)
HCT VFR BLD AUTO: 39.7 %
HGB BLD-MCNC: 13.9 G/DL
IMM GRANULOCYTES # BLD AUTO: 0.05 X10(3) UL (ref 0–1)
IMM GRANULOCYTES NFR BLD: 0.6 %
LDH SERPL L TO P-CCNC: 356 U/L
LYMPHOCYTES # BLD AUTO: 3.75 X10(3) UL (ref 1–4)
LYMPHOCYTES NFR BLD AUTO: 42.4 %
MCH RBC QN AUTO: 30.5 PG (ref 26–34)
MCHC RBC AUTO-ENTMCNC: 35 G/DL (ref 31–37)
MCV RBC AUTO: 87.1 FL
MONOCYTES # BLD AUTO: 0.6 X10(3) UL (ref 0.1–1)
MONOCYTES NFR BLD AUTO: 6.8 %
NEUTROPHILS # BLD AUTO: 4.13 X10 (3) UL (ref 1.5–7.7)
NEUTROPHILS # BLD AUTO: 4.13 X10(3) UL (ref 1.5–7.7)
NEUTROPHILS NFR BLD AUTO: 46.7 %
OSMOLALITY SERPL CALC.SUM OF ELEC: 291 MOSM/KG (ref 275–295)
PLATELET # BLD AUTO: 321 10(3)UL (ref 150–450)
POTASSIUM SERPL-SCNC: 3.8 MMOL/L (ref 3.5–5.1)
PROT SERPL-MCNC: 8.3 G/DL (ref 6.4–8.2)
RBC # BLD AUTO: 4.56 X10(6)UL
SODIUM SERPL-SCNC: 139 MMOL/L (ref 136–145)
WBC # BLD AUTO: 8.8 X10(3) UL (ref 4–11)

## 2022-11-01 PROCEDURE — 96372 THER/PROPH/DIAG INJ SC/IM: CPT

## 2022-11-01 PROCEDURE — 99214 OFFICE O/P EST MOD 30 MIN: CPT | Performed by: INTERNAL MEDICINE

## 2022-11-01 RX ORDER — LANREOTIDE ACETATE 120 MG/.5ML
120 INJECTION SUBCUTANEOUS ONCE
Status: COMPLETED | OUTPATIENT
Start: 2022-11-01 | End: 2022-11-01

## 2022-11-01 RX ORDER — LANREOTIDE ACETATE 120 MG/.5ML
120 INJECTION SUBCUTANEOUS ONCE
OUTPATIENT
Start: 2022-11-29

## 2022-11-01 RX ORDER — LANREOTIDE ACETATE 120 MG/.5ML
120 INJECTION SUBCUTANEOUS ONCE
OUTPATIENT
Start: 2022-11-28

## 2022-11-01 RX ADMIN — LANREOTIDE ACETATE 120 MG: 120 INJECTION SUBCUTANEOUS at 16:15:00

## 2022-11-01 NOTE — PROGRESS NOTES
Patient is here for MD f/u Pheochromocytoma of adrenal gland. B/p elevated today 186/107. Patient did not take b/p meds today. Patient is feeling well. Denies pain. No GI complaints. He continues on monthly Lanreotide injections.        Education Record    Learner:  Patient    Disease / Diagnosis: Pheochromocytoma of adrenal gland    Barriers / Limitations:  None   Comments:    Method:  Discussion   Comments:    General Topics:  Plan of care reviewed   Comments:    Outcome:  Shows understanding   Comments:

## 2022-11-01 NOTE — PROGRESS NOTES
Education Record    Learner:  Patient    Disease / Kobe Ulloa of right adrenal gland     Barriers / Limitations:  None   Comments:    Method:  Brief focused   Comments:    General Topics:  Infection, Medication, Pain, Precautions, Procedure, Side effects and symptom management, Plan of care reviewed and Fall risk and prevention   Comments:    Outcome:  Shows understanding   Comments:     Tolerated his injection well

## 2022-11-29 ENCOUNTER — OFFICE VISIT (OUTPATIENT)
Dept: HEMATOLOGY/ONCOLOGY | Facility: HOSPITAL | Age: 52
End: 2022-11-29
Attending: INTERNAL MEDICINE
Payer: COMMERCIAL

## 2022-11-29 VITALS
TEMPERATURE: 97 F | OXYGEN SATURATION: 99 % | BODY MASS INDEX: 31 KG/M2 | SYSTOLIC BLOOD PRESSURE: 203 MMHG | DIASTOLIC BLOOD PRESSURE: 118 MMHG | HEART RATE: 66 BPM | RESPIRATION RATE: 18 BRPM | WEIGHT: 222.63 LBS

## 2022-11-29 DIAGNOSIS — D44.7 PARAGANGLIOMA (HCC): Primary | ICD-10-CM

## 2022-11-29 DIAGNOSIS — D35.01 PHEOCHROMOCYTOMA OF RIGHT ADRENAL GLAND: ICD-10-CM

## 2022-11-29 PROCEDURE — 96372 THER/PROPH/DIAG INJ SC/IM: CPT

## 2022-11-29 RX ORDER — LANREOTIDE ACETATE 120 MG/.5ML
120 INJECTION SUBCUTANEOUS ONCE
OUTPATIENT
Start: 2022-12-27

## 2022-11-29 RX ORDER — LANREOTIDE ACETATE 120 MG/.5ML
120 INJECTION SUBCUTANEOUS ONCE
Status: COMPLETED | OUTPATIENT
Start: 2022-11-29 | End: 2022-11-29

## 2022-11-29 RX ADMIN — LANREOTIDE ACETATE 120 MG: 120 INJECTION SUBCUTANEOUS at 15:43:00

## 2022-11-29 NOTE — PROGRESS NOTES
Education Record    Learner:  Patient    Disease / Diagnosis: paraganglioma    Barriers / Limitations:  None    Method:  Brief focused, printed material and  reinforcement    General Topics:  Plan of care reviewed    Outcome:  Shows understanding    Here for somatuline. SBP >200, checked twice. Denying any HA, blurred vision or other symptoms. Reports that he didn't take his afternoon BP meds. Pt has hx non-compliance with medication. NP notified of BP--okay to give injection and instructed pt to immediately take his BP meds as scheduled. Left in stable condition.  Has next appt, scheduling his MRI for prior to next appt

## 2022-12-27 ENCOUNTER — OFFICE VISIT (OUTPATIENT)
Dept: HEMATOLOGY/ONCOLOGY | Facility: HOSPITAL | Age: 52
End: 2022-12-27
Attending: INTERNAL MEDICINE
Payer: COMMERCIAL

## 2022-12-27 VITALS
OXYGEN SATURATION: 97 % | SYSTOLIC BLOOD PRESSURE: 183 MMHG | TEMPERATURE: 97 F | HEART RATE: 83 BPM | WEIGHT: 226 LBS | DIASTOLIC BLOOD PRESSURE: 106 MMHG | BODY MASS INDEX: 32 KG/M2 | RESPIRATION RATE: 18 BRPM

## 2022-12-27 DIAGNOSIS — D44.7 PARAGANGLIOMA (HCC): Primary | ICD-10-CM

## 2022-12-27 DIAGNOSIS — D35.01 PHEOCHROMOCYTOMA OF RIGHT ADRENAL GLAND: ICD-10-CM

## 2022-12-27 DIAGNOSIS — I15.8 OTHER SECONDARY HYPERTENSION: ICD-10-CM

## 2022-12-27 PROCEDURE — 96372 THER/PROPH/DIAG INJ SC/IM: CPT

## 2022-12-27 PROCEDURE — 99214 OFFICE O/P EST MOD 30 MIN: CPT | Performed by: INTERNAL MEDICINE

## 2022-12-27 RX ORDER — METOPROLOL SUCCINATE 100 MG/1
200 TABLET, EXTENDED RELEASE ORAL DAILY
Qty: 180 TABLET | Refills: 3 | Status: SHIPPED | OUTPATIENT
Start: 2022-12-27

## 2022-12-27 RX ORDER — LANREOTIDE ACETATE 120 MG/.5ML
120 INJECTION SUBCUTANEOUS ONCE
Status: COMPLETED | OUTPATIENT
Start: 2022-12-27 | End: 2022-12-27

## 2022-12-27 RX ORDER — LANREOTIDE ACETATE 120 MG/.5ML
120 INJECTION SUBCUTANEOUS ONCE
OUTPATIENT
Start: 2023-01-24

## 2022-12-27 RX ADMIN — LANREOTIDE ACETATE 120 MG: 120 INJECTION SUBCUTANEOUS at 16:01:00

## 2022-12-27 NOTE — PROGRESS NOTES
Patient is here for MD follow up and Lanreotide injection. Blood pressure today 183/106. Patient has not taken his Metoprolol or Terazosin today. Patient states he feels he is having anxiety on occasion. He has been under some stress lately. He c/o heart palpitations and \"butterflies\" in his stomach. Requesting medication for anxiety. No GI complaints. Appetite is good. Energy level is ok.        Education Record    Learner:  Patient    Disease / Diagnosis:  Pheochromocytoma     Barriers / Limitations:  None   Comments:    Method:  Discussion   Comments:    General Topics:  Plan of care reviewed   Comments:    Outcome:  Shows understanding   Comments:

## 2022-12-28 NOTE — PROGRESS NOTES
[facility]    PATIENT'S NAME: Billy Burch   ATTENDING PHYSICIAN: Apple Santos M.D. PATIENT ACCOUNT #: [de-identified] LOCATION: [address] [place] [address] RECORD #: [id number] YOB: 1970   DATE OF SERVICE: 12/27/2022       CANCER CENTER PROGRESS NOTE    CHIEF COMPLAINT:  Treatment of unresectable paraganglioma/pheochromocytoma. HISTORY OF PRESENT ILLNESS:  The patient is a 53-year-old male. He has poorly controlled hypertension. He was found to have a mass in the right adrenal gland and was felt to have secondary hypertension on the basis of the paraganglioma/pheochromocytoma. His initial biopsy was in September 2020. He was seen by Dr. Jonatan Carey. He was explored for resection and felt to be unresectable due to adherence of overlying vascular structures. He has been started on lanreotide along with his alpha-blockers and beta-blockers. He is not good about taking his blood pressure medication. 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 At the same time, he does have feelings of fluttering of his heart and sensation of anxiety and palpitations. He is asking about anxiety medicine, but I told him his description is not seen with oversecretion of catecholamines. His blood pressure is always on the high side and today, he, again, did not take his morning medicine and when he came in it was 180/100. He has no headaches at present. He has no chest pain at present. He feels at times a fluttering in his chest and butterflies in his stomach. He notes this when he is lying down. He has no specific GI complaints. His appetite remains good. His energy level is good. He is due for his next dose of lanreotide today. MEDICATIONS:  His current medications include metoprolol tartrate 100 mg b.i.d. which he takes unreliably, terazosin 10 mg twice a day which he also takes unreliably.      PHYSICAL EXAMINATION:    GENERAL:  He is a well-appearing male in no acute distress. VITAL SIGNS:  His performance status is 0 to 1. His weight is 226 pounds. His blood pressure today is 183/106, pulse 83, respiratory rate is 20, temperature is 97.3. HEENT:  Unremarkable. LYMPHATICS:  He has no adenopathy. LUNGS:  Clear. HEART:  Normal.   ABDOMEN:  No hepatosplenomegaly or tenderness. EXTREMITIES:  He has no clubbing, cyanosis, or edema. NEUROLOGIC:  He is intact. LABORATORY DATA:  His creatinine is actually relative stable, though moderately elevated at 1.31. His AST and ALT are mildly elevated but stable at 62 and 76. His LDH is actually slightly lower at 266. His CBC is normal.    IMPRESSION:    1. Paraganglioma/pheochromocytoma. He continues on monthly lanreotide. He was supposed to have had an MRI done before today's visit. He did not followthrough on this. I reprinted the order and asked him to get this in the next 2 weeks. We need to see whether or not the lanreotide is slowing the growth of his mass. If not, he would be a candidate for PRRT. 2.   Secondary hypertension. I impressed upon him the fact that he may have a stroke, heart attack, or acute congestive heart failure or renal failure based on uncontrolled blood pressure. I am changing his metoprolol to succinate. I told him to take it at night so that hopefully he will not have the feeling that he has when he tries to go to work in the morning. I explained to him the difference between alpha-blockers and beta-blockers and that he needs to take both of these together. I want him to see Dr. Marilee Rodriguez to help assist in managing his blood pressure. I will reach out to Dr. Marilee Rodriguez to arrange for a visit. In the meantime, he is to call me after a couple of days on the new formulation and we will see where his blood pressure is. I will speak with him once the MRI is back as well, too.     Dictated By Marisa Herrera M.D.  d: 12/27/2022 17:10:35  t: 12/27/2022 17:53:24  [facility] [id number]  /    cc: NILTON Washington M.D. Astrid Ahle, M.D. Brita Fisherman, M.D.

## 2023-01-22 ENCOUNTER — HOSPITAL ENCOUNTER (OUTPATIENT)
Dept: MRI IMAGING | Facility: HOSPITAL | Age: 53
End: 2023-01-22
Attending: INTERNAL MEDICINE
Payer: COMMERCIAL

## 2023-01-22 ENCOUNTER — HOSPITAL ENCOUNTER (OUTPATIENT)
Dept: MRI IMAGING | Facility: HOSPITAL | Age: 53
Discharge: HOME | End: 2023-01-22
Attending: INTERNAL MEDICINE
Payer: COMMERCIAL

## 2023-01-22 DIAGNOSIS — D35.01 PHEOCHROMOCYTOMA OF RIGHT ADRENAL GLAND: ICD-10-CM

## 2023-01-22 PROCEDURE — 74183 MRI ABD W/O CNTR FLWD CNTR: CPT | Performed by: INTERNAL MEDICINE

## 2023-01-22 PROCEDURE — A9575 INJ GADOTERATE MEGLUMI 0.1ML: HCPCS | Performed by: INTERNAL MEDICINE

## 2023-01-22 RX ORDER — GADOTERATE MEGLUMINE 376.9 MG/ML
20 INJECTION INTRAVENOUS
Status: COMPLETED | OUTPATIENT
Start: 2023-01-22 | End: 2023-01-22

## 2023-01-22 RX ADMIN — GADOTERATE MEGLUMINE 20 ML: 376.9 INJECTION INTRAVENOUS at 09:33:00

## 2023-01-26 NOTE — TELEPHONE ENCOUNTER
Hypertension Medications Protocol Failed 01/26/2023 12:23 PM   Protocol Details  Appointment in past 6 or next 3 months    CMP or BMP in past 12 months    Last serum creatinine< 2.0     Last OV 7/.11/22 for Px  Due for f/u  [facility] sent  Future Appointments   Date Time Provider Brian Bass   1/27/2023  3:30 PM Peyton Yo Replaced by [facility] [place]   2/21/2023  3:30 PM Nanda [address] [place] [address]   3/21/2023  2:45 PM Rose Yusuf MD [facility]   3/21/2023  3:00 PM Kade Faria [address] [place] [address]

## 2023-01-27 ENCOUNTER — APPOINTMENT (OUTPATIENT)
Dept: HEMATOLOGY/ONCOLOGY | Facility: HOSPITAL | Age: 53
End: 2023-01-27
Attending: INTERNAL MEDICINE
Payer: COMMERCIAL

## 2023-02-01 RX ORDER — TERAZOSIN 10 MG/1
10 CAPSULE ORAL 2 TIMES DAILY
Qty: 180 CAPSULE | Refills: 0 | Status: SHIPPED | OUTPATIENT
Start: 2023-02-01

## 2023-02-01 RX ORDER — TERAZOSIN 10 MG/1
CAPSULE ORAL
Qty: 180 CAPSULE | Refills: 0 | OUTPATIENT
Start: 2023-02-01

## 2023-02-01 RX ORDER — METOPROLOL TARTRATE 50 MG/1
TABLET, FILM COATED ORAL
Qty: 360 TABLET | Refills: 0 | OUTPATIENT
Start: 2023-02-01

## 2023-02-01 NOTE — TELEPHONE ENCOUNTER
Patient advised. Verbalized understanding.    Future Appointments   Date Time Provider Brian Bass   2/2/2023  3:30 PM [address] [place] [address]   2/21/2023  3:30 PM Nanda [address] [facility]   2/22/2023  2:00 PM Meghan Arroyo MD [facility]   3/21/2023  2:45 PM Karolina Prince MD [address] [facility]   3/21/2023  3:00 PM Kade Faria [address] no longer on metoprolol  Refills sent

## 2023-02-02 ENCOUNTER — OFFICE VISIT (OUTPATIENT)
Dept: HEMATOLOGY/ONCOLOGY | Facility: HOSPITAL | Age: 53
End: 2023-02-02
Attending: INTERNAL MEDICINE
Payer: COMMERCIAL

## 2023-02-02 VITALS
RESPIRATION RATE: 18 BRPM | SYSTOLIC BLOOD PRESSURE: 146 MMHG | HEART RATE: 75 BPM | OXYGEN SATURATION: 98 % | DIASTOLIC BLOOD PRESSURE: 82 MMHG | TEMPERATURE: 98 F

## 2023-02-02 DIAGNOSIS — D44.7 PARAGANGLIOMA (HCC): Primary | ICD-10-CM

## 2023-02-02 DIAGNOSIS — D35.01 PHEOCHROMOCYTOMA OF RIGHT ADRENAL GLAND: ICD-10-CM

## 2023-02-02 RX ORDER — LANREOTIDE ACETATE 120 MG/.5ML
120 INJECTION SUBCUTANEOUS ONCE
Status: COMPLETED | OUTPATIENT
Start: 2023-02-02 | End: 2023-02-02

## 2023-02-02 RX ORDER — LANREOTIDE ACETATE 120 MG/.5ML
120 INJECTION SUBCUTANEOUS ONCE
OUTPATIENT
Start: 2023-02-21

## 2023-02-02 RX ADMIN — LANREOTIDE ACETATE 120 MG: 120 INJECTION SUBCUTANEOUS at 15:34:00

## 2023-02-02 NOTE — PROGRESS NOTES
Education Record    Learner:  Patient    Disease / Diagnosis: lanreotide inj    Barriers / Limitations:  None   Comments:    Method:  Brief focused   Comments:    General Topics:  Plan of care reviewed   Comments:    Outcome:  Shows understanding   Comments:

## 2023-02-21 ENCOUNTER — APPOINTMENT (OUTPATIENT)
Dept: HEMATOLOGY/ONCOLOGY | Facility: HOSPITAL | Age: 53
End: 2023-02-21
Attending: INTERNAL MEDICINE
Payer: COMMERCIAL

## 2023-03-02 ENCOUNTER — APPOINTMENT (OUTPATIENT)
Dept: HEMATOLOGY/ONCOLOGY | Facility: HOSPITAL | Age: 53
End: 2023-03-02
Attending: INTERNAL MEDICINE
Payer: COMMERCIAL

## 2023-03-02 ENCOUNTER — TELEPHONE (OUTPATIENT)
Dept: HEMATOLOGY/ONCOLOGY | Facility: HOSPITAL | Age: 53
End: 2023-03-02

## 2023-03-06 ENCOUNTER — OFFICE VISIT (OUTPATIENT)
Dept: HEMATOLOGY/ONCOLOGY | Facility: HOSPITAL | Age: 53
End: 2023-03-06
Attending: INTERNAL MEDICINE
Payer: COMMERCIAL

## 2023-03-06 VITALS
DIASTOLIC BLOOD PRESSURE: 100 MMHG | OXYGEN SATURATION: 98 % | HEART RATE: 83 BPM | RESPIRATION RATE: 18 BRPM | SYSTOLIC BLOOD PRESSURE: 168 MMHG | TEMPERATURE: 97 F

## 2023-03-06 DIAGNOSIS — D44.7 PARAGANGLIOMA (HCC): Primary | ICD-10-CM

## 2023-03-06 DIAGNOSIS — D35.01 PHEOCHROMOCYTOMA OF RIGHT ADRENAL GLAND: ICD-10-CM

## 2023-03-06 PROCEDURE — 96372 THER/PROPH/DIAG INJ SC/IM: CPT

## 2023-03-06 RX ORDER — LANREOTIDE ACETATE 120 MG/.5ML
120 INJECTION SUBCUTANEOUS ONCE
OUTPATIENT
Start: 2023-03-30

## 2023-03-06 RX ORDER — LANREOTIDE ACETATE 120 MG/.5ML
120 INJECTION SUBCUTANEOUS ONCE
Status: COMPLETED | OUTPATIENT
Start: 2023-03-06 | End: 2023-03-06

## 2023-03-06 RX ADMIN — LANREOTIDE ACETATE 120 MG: 120 INJECTION SUBCUTANEOUS at 15:45:00

## 2023-03-06 NOTE — PROGRESS NOTES
Education Record    Learner:  Patient    Disease / Tracie Finely    Barriers / Limitations:  None   Comments:    Method:  Brief focused   Comments:    General Topics:  Infection, Medication, Pain, Precautions, Procedure, Side effects and symptom management, Plan of care reviewed and Fall risk and prevention   Comments:    Outcome:  Shows understanding   Comments:    Patient tolerated his Lanreotide injection in L buttock area without any issues

## 2023-03-21 ENCOUNTER — APPOINTMENT (OUTPATIENT)
Dept: HEMATOLOGY/ONCOLOGY | Facility: HOSPITAL | Age: 53
End: 2023-03-21
Attending: INTERNAL MEDICINE
Payer: COMMERCIAL

## 2023-03-31 ENCOUNTER — OFFICE VISIT (OUTPATIENT)
Dept: HEMATOLOGY/ONCOLOGY | Facility: HOSPITAL | Age: 53
End: 2023-03-31
Attending: INTERNAL MEDICINE
Payer: COMMERCIAL

## 2023-03-31 VITALS
OXYGEN SATURATION: 98 % | BODY MASS INDEX: 30 KG/M2 | DIASTOLIC BLOOD PRESSURE: 93 MMHG | TEMPERATURE: 98 F | HEART RATE: 78 BPM | WEIGHT: 218.5 LBS | RESPIRATION RATE: 18 BRPM | SYSTOLIC BLOOD PRESSURE: 158 MMHG

## 2023-03-31 DIAGNOSIS — D35.01 PHEOCHROMOCYTOMA OF RIGHT ADRENAL GLAND: ICD-10-CM

## 2023-03-31 DIAGNOSIS — D35.01 PHEOCHROMOCYTOMA OF RIGHT ADRENAL GLAND: Primary | ICD-10-CM

## 2023-03-31 DIAGNOSIS — D44.7 PARAGANGLIOMA (HCC): ICD-10-CM

## 2023-03-31 DIAGNOSIS — D44.7 PARAGANGLIOMA (HCC): Primary | ICD-10-CM

## 2023-03-31 LAB
ALBUMIN SERPL-MCNC: 4.3 G/DL (ref 3.4–5)
ALBUMIN/GLOB SERPL: 1.2 {RATIO} (ref 1–2)
ALP LIVER SERPL-CCNC: 91 U/L
ALT SERPL-CCNC: 63 U/L
ANION GAP SERPL CALC-SCNC: 4 MMOL/L (ref 0–18)
AST SERPL-CCNC: 53 U/L (ref 15–37)
BASOPHILS # BLD AUTO: 0.07 X10(3) UL (ref 0–0.2)
BASOPHILS NFR BLD AUTO: 0.7 %
BILIRUB SERPL-MCNC: 0.6 MG/DL (ref 0.1–2)
BUN BLD-MCNC: 14 MG/DL (ref 7–18)
CALCIUM BLD-MCNC: 9.5 MG/DL (ref 8.5–10.1)
CHLORIDE SERPL-SCNC: 110 MMOL/L (ref 98–112)
CO2 SERPL-SCNC: 25 MMOL/L (ref 21–32)
CREAT BLD-MCNC: 1.45 MG/DL
EOSINOPHIL # BLD AUTO: 0.23 X10(3) UL (ref 0–0.7)
EOSINOPHIL NFR BLD AUTO: 2.2 %
ERYTHROCYTE [DISTWIDTH] IN BLOOD BY AUTOMATED COUNT: 12.4 %
FASTING STATUS PATIENT QL REPORTED: NO
GFR SERPLBLD BASED ON 1.73 SQ M-ARVRAT: 59 ML/MIN/1.73M2 (ref 60–?)
GLOBULIN PLAS-MCNC: 3.6 G/DL (ref 2.8–4.4)
GLUCOSE BLD-MCNC: 160 MG/DL (ref 70–99)
HCT VFR BLD AUTO: 40.2 %
HGB BLD-MCNC: 13.5 G/DL
IMM GRANULOCYTES # BLD AUTO: 0.03 X10(3) UL (ref 0–1)
IMM GRANULOCYTES NFR BLD: 0.3 %
LDH SERPL L TO P-CCNC: 279 U/L
LYMPHOCYTES # BLD AUTO: 3.64 X10(3) UL (ref 1–4)
LYMPHOCYTES NFR BLD AUTO: 35.4 %
MCH RBC QN AUTO: 29.9 PG (ref 26–34)
MCHC RBC AUTO-ENTMCNC: 33.6 G/DL (ref 31–37)
MCV RBC AUTO: 89.1 FL
MONOCYTES # BLD AUTO: 0.89 X10(3) UL (ref 0.1–1)
MONOCYTES NFR BLD AUTO: 8.6 %
NEUTROPHILS # BLD AUTO: 5.43 X10 (3) UL (ref 1.5–7.7)
NEUTROPHILS # BLD AUTO: 5.43 X10(3) UL (ref 1.5–7.7)
NEUTROPHILS NFR BLD AUTO: 52.8 %
OSMOLALITY SERPL CALC.SUM OF ELEC: 292 MOSM/KG (ref 275–295)
PLATELET # BLD AUTO: 283 10(3)UL (ref 150–450)
POTASSIUM SERPL-SCNC: 4.3 MMOL/L (ref 3.5–5.1)
PROT SERPL-MCNC: 7.9 G/DL (ref 6.4–8.2)
RBC # BLD AUTO: 4.51 X10(6)UL
SODIUM SERPL-SCNC: 139 MMOL/L (ref 136–145)
WBC # BLD AUTO: 10.3 X10(3) UL (ref 4–11)

## 2023-03-31 PROCEDURE — 99214 OFFICE O/P EST MOD 30 MIN: CPT | Performed by: INTERNAL MEDICINE

## 2023-03-31 PROCEDURE — 96372 THER/PROPH/DIAG INJ SC/IM: CPT

## 2023-03-31 RX ORDER — METOPROLOL SUCCINATE 100 MG/1
TABLET, EXTENDED RELEASE ORAL
COMMUNITY
Start: 2023-03-27

## 2023-03-31 RX ORDER — AMLODIPINE BESYLATE 5 MG/1
5 TABLET ORAL 2 TIMES DAILY
Qty: 180 TABLET | Refills: 11 | Status: SHIPPED | OUTPATIENT
Start: 2023-03-31

## 2023-03-31 RX ORDER — LANREOTIDE ACETATE 120 MG/.5ML
120 INJECTION SUBCUTANEOUS ONCE
Status: COMPLETED | OUTPATIENT
Start: 2023-03-31 | End: 2023-03-31

## 2023-03-31 RX ORDER — LANREOTIDE ACETATE 120 MG/.5ML
120 INJECTION SUBCUTANEOUS ONCE
Status: CANCELLED | OUTPATIENT
Start: 2023-04-03

## 2023-03-31 RX ADMIN — LANREOTIDE ACETATE 120 MG: 120 INJECTION SUBCUTANEOUS at 15:40:00

## 2023-03-31 NOTE — PROGRESS NOTES
Education Record    Learner:  Patient    Disease / Diagnosis: paraganglioma     Barriers / Limitations:  None   Comments:    Method:  Brief focused   Comments:    General Topics:  Plan of care reviewed   Comments:    Outcome:  Shows understanding   Comments:    Lanreotide inj - tolerated without issue. Will return in 4 weeks for same.

## 2023-03-31 NOTE — PROGRESS NOTES
Patient is here for MD f/u for Paraganglioma. He is due for Lanreotide injection. He continues on Metoprolol and Terazosin. Blood pressure has improved to 158/93. Feeling well. No GI complaints.          Education Record    Learner:  Patient    Disease / Diagnosis:  Paraganglioma     Barriers / Limitations:  None   Comments:    Method:  Discussion   Comments:    General Topics:  Plan of care reviewed   Comments:    Outcome:  Shows understanding   Comments:

## 2023-04-02 LAB — CHROMOGRANIN A: 1609 NG/ML

## 2023-04-06 ENCOUNTER — APPOINTMENT (OUTPATIENT)
Dept: HEMATOLOGY/ONCOLOGY | Facility: HOSPITAL | Age: 53
End: 2023-04-06
Attending: INTERNAL MEDICINE
Payer: COMMERCIAL

## 2023-04-06 NOTE — PROGRESS NOTES
[facility]    PATIENT'S NAME: Saumya Wood   ATTENDING PHYSICIAN: Akosua Cisneros M.D. 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 HISTORY OF PRESENT ILLNESS:  The patient is a 74-year-old male. He has a mass in the right adrenal gland that was noted when he was worked up for secondary hypertension. His biopsy in September 2020 showed pheochromocytoma/paraganglioma. He had exploratory attempt at resection and he was felt to be unresectable due to adherence of overlying vascular structures. He was dotatate avid. He was started on lanreotide. He also received a course of radiation therapy to that area. He has been started on blood pressure medication. For a while, he was not very good at taking his alpha and beta blockers. He has gotten better at this after we have impressed upon him the need to keep this controlled. His blood pressure at home has been running in the 140 to 711 range systolically and around 90 diastolically. This is much better than it previously had been but probably still a bit high. His weight is stable. His appetite is good. He has no major abdominal pain. He occasionally has mild right flank discomfort. There are no GI complaints. He has maintained his weight and he is working full-time. MEDICATIONS:  His current medications include amlodipine 10 mg daily in divided doses, metoprolol succinate extended release 100 mg daily, terazosin 10 mg twice daily. PHYSICAL EXAMINATION:  GENERAL:  He is a well-appearing male in no acute distress. VITAL SIGNS:  His performance status is 0. His weight is 218 pounds. Blood pressure is 158/93 here but better at home, pulse 78. Respiratory rate is 20. Temperature is 97.5.   HEENT:  Unremarkable, with pink conjunctivae, anicteric sclerae. Pharynx without lesions. LYMPHATICS:  He has no cervical, supraclavicular, or axillary adenopathy. LUNGS:  Resonant to percussion and clear to auscultation with no wheezing, rales, or rhonchi. HEART:  Normal.  ABDOMEN:  No hepatosplenomegaly or tenderness. EXTREMITIES:  He has no clubbing, cyanosis, or edema. NEUROLOGIC:  He is intact. LABORATORY DATA:  CBC is normal today. BUN and creatinine are 14 and 1.45, which are at his baseline. AST and ALT are 53 and 62, which are stable. His LDH is mildly elevated at 279. His chromogranin A is 1609, which is up from previous value of 1114. IMPRESSION:  1. Pheochromocytoma. He had his last imaging done with an MRI in January. This appeared to show stability. He is continuing on lanreotide. He is dotatate avid. He would potentially be a candidate for a theranostic approach, though it is unclear to me if he would be able to get coverage for [facility] based on his histology even though he is dotatate avid. Consideration could be given to looking for I-131 MIBG treatment. It is, I believe, currently available at the [facility], but we would have to pursue this in the future should he have progressive disease. 2.   Hypertension. He needs to continue to be carefully monitoring this, and he may need another agent added to his regimen. He is going to see Dr. Olga Arvizu in my absence after my [facility]. He is scheduled to see him in June. In the meantime, we have decided to keep him on the lanreotide and he is given this on a monthly basis. The hope would be that it would stop progression and potentially reduce secretion of vasoactive chemicals.     Dictated By Heidi Jon M.D.  d: 04/05/2023 11:21:11  t: 04/06/2023 02:31:45  Job [id number]  OD/    cc: MD Gerry Ibanez M.D.

## 2023-04-28 ENCOUNTER — OFFICE VISIT (OUTPATIENT)
Dept: HEMATOLOGY/ONCOLOGY | Facility: HOSPITAL | Age: 53
End: 2023-04-28
Attending: INTERNAL MEDICINE
Payer: COMMERCIAL

## 2023-04-28 DIAGNOSIS — D35.01 PHEOCHROMOCYTOMA OF RIGHT ADRENAL GLAND: ICD-10-CM

## 2023-04-28 DIAGNOSIS — D44.7 PARAGANGLIOMA (HCC): Primary | ICD-10-CM

## 2023-04-28 PROCEDURE — 96372 THER/PROPH/DIAG INJ SC/IM: CPT

## 2023-04-28 RX ORDER — LANREOTIDE ACETATE 120 MG/.5ML
120 INJECTION SUBCUTANEOUS ONCE
OUTPATIENT
Start: 2023-05-26

## 2023-04-28 RX ORDER — LANREOTIDE ACETATE 120 MG/.5ML
120 INJECTION SUBCUTANEOUS ONCE
Status: COMPLETED | OUTPATIENT
Start: 2023-04-28 | End: 2023-04-28

## 2023-04-28 RX ORDER — LANREOTIDE ACETATE 120 MG/.5ML
120 INJECTION SUBCUTANEOUS ONCE
Status: CANCELLED | OUTPATIENT
Start: 2023-04-28

## 2023-04-28 RX ADMIN — LANREOTIDE ACETATE 120 MG: 120 INJECTION SUBCUTANEOUS at 15:25:00

## 2023-04-28 NOTE — PROGRESS NOTES
Education Record    Learner:  Patient    Disease / Diagnosis: paraganglioma    Barriers / Limitations:  None   Comments:    Method:  Discussion   Comments:    General Topics:  Plan of care reviewed   Comments:    Outcome:  Shows understanding   Comments:    Pt here for lanreotide injection. Tolerated well. Discharged in stable condition.

## 2023-05-26 ENCOUNTER — OFFICE VISIT (OUTPATIENT)
Dept: HEMATOLOGY/ONCOLOGY | Facility: HOSPITAL | Age: 53
End: 2023-05-26
Attending: INTERNAL MEDICINE
Payer: COMMERCIAL

## 2023-05-26 VITALS
SYSTOLIC BLOOD PRESSURE: 147 MMHG | HEART RATE: 80 BPM | DIASTOLIC BLOOD PRESSURE: 83 MMHG | RESPIRATION RATE: 16 BRPM | BODY MASS INDEX: 30.66 KG/M2 | HEIGHT: 70.98 IN | OXYGEN SATURATION: 99 % | TEMPERATURE: 97 F | WEIGHT: 219 LBS

## 2023-05-26 DIAGNOSIS — D35.01 PHEOCHROMOCYTOMA OF RIGHT ADRENAL GLAND: ICD-10-CM

## 2023-05-26 DIAGNOSIS — D44.7 PARAGANGLIOMA (HCC): Primary | ICD-10-CM

## 2023-05-26 PROCEDURE — 96372 THER/PROPH/DIAG INJ SC/IM: CPT

## 2023-05-26 RX ORDER — LANREOTIDE ACETATE 120 MG/.5ML
120 INJECTION SUBCUTANEOUS ONCE
Status: COMPLETED | OUTPATIENT
Start: 2023-05-26 | End: 2023-05-26

## 2023-05-26 RX ORDER — LANREOTIDE ACETATE 120 MG/.5ML
120 INJECTION SUBCUTANEOUS ONCE
OUTPATIENT
Start: 2023-06-23

## 2023-05-26 RX ADMIN — LANREOTIDE ACETATE 120 MG: 120 INJECTION SUBCUTANEOUS at 15:14:00

## 2023-06-05 RX ORDER — TERAZOSIN 10 MG/1
10 CAPSULE ORAL 2 TIMES DAILY
Qty: 180 CAPSULE | Refills: 0 | OUTPATIENT
Start: 2023-06-05

## 2023-06-05 RX ORDER — TERAZOSIN 10 MG/1
CAPSULE ORAL
Qty: 60 CAPSULE | Refills: 0 | Status: SHIPPED | OUTPATIENT
Start: 2023-06-05

## 2023-06-05 NOTE — TELEPHONE ENCOUNTER
Pt has been out of his medication since yesterday.  Can this be sent to the pharmacy today  Send to [facility] on [address]

## 2023-06-15 ENCOUNTER — APPOINTMENT (OUTPATIENT)
Dept: HEMATOLOGY/ONCOLOGY | Facility: HOSPITAL | Age: 53
End: 2023-06-15
Attending: INTERNAL MEDICINE
Payer: COMMERCIAL

## 2023-06-23 ENCOUNTER — OFFICE VISIT (OUTPATIENT)
Dept: HEMATOLOGY/ONCOLOGY | Facility: HOSPITAL | Age: 53
End: 2023-06-23
Attending: INTERNAL MEDICINE
Payer: COMMERCIAL

## 2023-06-23 DIAGNOSIS — D35.01 PHEOCHROMOCYTOMA OF RIGHT ADRENAL GLAND: ICD-10-CM

## 2023-06-23 DIAGNOSIS — D44.7 PARAGANGLIOMA (HCC): Primary | ICD-10-CM

## 2023-06-23 LAB
ALBUMIN SERPL-MCNC: 4.5 G/DL (ref 3.4–5)
ALBUMIN/GLOB SERPL: 1.2 {RATIO} (ref 1–2)
ALP LIVER SERPL-CCNC: 106 U/L
ALT SERPL-CCNC: 71 U/L
ANION GAP SERPL CALC-SCNC: 1 MMOL/L (ref 0–18)
AST SERPL-CCNC: 51 U/L (ref 15–37)
BASOPHILS # BLD AUTO: 0.07 X10(3) UL (ref 0–0.2)
BASOPHILS NFR BLD AUTO: 0.9 %
BILIRUB SERPL-MCNC: 0.5 MG/DL (ref 0.1–2)
BUN BLD-MCNC: 19 MG/DL (ref 7–18)
CALCIUM BLD-MCNC: 9.4 MG/DL (ref 8.5–10.1)
CHLORIDE SERPL-SCNC: 113 MMOL/L (ref 98–112)
CO2 SERPL-SCNC: 23 MMOL/L (ref 21–32)
CREAT BLD-MCNC: 1.32 MG/DL
EOSINOPHIL # BLD AUTO: 0.27 X10(3) UL (ref 0–0.7)
EOSINOPHIL NFR BLD AUTO: 3.4 %
ERYTHROCYTE [DISTWIDTH] IN BLOOD BY AUTOMATED COUNT: 12.5 %
GFR SERPLBLD BASED ON 1.73 SQ M-ARVRAT: 65 ML/MIN/1.73M2 (ref 60–?)
GLOBULIN PLAS-MCNC: 3.8 G/DL (ref 2.8–4.4)
GLUCOSE BLD-MCNC: 104 MG/DL (ref 70–99)
HCT VFR BLD AUTO: 42.9 %
HGB BLD-MCNC: 14.1 G/DL
IMM GRANULOCYTES # BLD AUTO: 0.04 X10(3) UL (ref 0–1)
IMM GRANULOCYTES NFR BLD: 0.5 %
LDH SERPL L TO P-CCNC: 256 U/L
LYMPHOCYTES # BLD AUTO: 3.23 X10(3) UL (ref 1–4)
LYMPHOCYTES NFR BLD AUTO: 40.3 %
MCH RBC QN AUTO: 29.6 PG (ref 26–34)
MCHC RBC AUTO-ENTMCNC: 32.9 G/DL (ref 31–37)
MCV RBC AUTO: 89.9 FL
MONOCYTES # BLD AUTO: 0.62 X10(3) UL (ref 0.1–1)
MONOCYTES NFR BLD AUTO: 7.7 %
NEUTROPHILS # BLD AUTO: 3.79 X10 (3) UL (ref 1.5–7.7)
NEUTROPHILS # BLD AUTO: 3.79 X10(3) UL (ref 1.5–7.7)
NEUTROPHILS NFR BLD AUTO: 47.2 %
OSMOLALITY SERPL CALC.SUM OF ELEC: 287 MOSM/KG (ref 275–295)
PLATELET # BLD AUTO: 252 10(3)UL (ref 150–450)
POTASSIUM SERPL-SCNC: 4.1 MMOL/L (ref 3.5–5.1)
PROT SERPL-MCNC: 8.3 G/DL (ref 6.4–8.2)
RBC # BLD AUTO: 4.77 X10(6)UL
SODIUM SERPL-SCNC: 137 MMOL/L (ref 136–145)
WBC # BLD AUTO: 8 X10(3) UL (ref 4–11)

## 2023-06-23 PROCEDURE — 85025 COMPLETE CBC W/AUTO DIFF WBC: CPT

## 2023-06-23 PROCEDURE — 86316 IMMUNOASSAY TUMOR OTHER: CPT

## 2023-06-23 PROCEDURE — 36415 COLL VENOUS BLD VENIPUNCTURE: CPT

## 2023-06-23 PROCEDURE — 80053 COMPREHEN METABOLIC PANEL: CPT

## 2023-06-23 PROCEDURE — 83615 LACTATE (LD) (LDH) ENZYME: CPT

## 2023-06-23 PROCEDURE — 96372 THER/PROPH/DIAG INJ SC/IM: CPT

## 2023-06-23 RX ORDER — LANREOTIDE ACETATE 120 MG/.5ML
120 INJECTION SUBCUTANEOUS ONCE
OUTPATIENT
Start: 2023-07-21

## 2023-06-23 RX ORDER — LANREOTIDE ACETATE 120 MG/.5ML
120 INJECTION SUBCUTANEOUS ONCE
Status: COMPLETED | OUTPATIENT
Start: 2023-06-23 | End: 2023-06-23

## 2023-06-23 RX ADMIN — LANREOTIDE ACETATE 120 MG: 120 INJECTION SUBCUTANEOUS at 15:50:00

## 2023-06-23 NOTE — PROGRESS NOTES
Education Record    Learner:  Patient    Disease / Diagnosis: Pt here for lanreotide inj and labs    Barriers / Limitations:  None    Method:  Discussion    General Topics:  Plan of care reviewed    Outcome:  Shows understanding    Pt waldemar injection well. Labs drawn per MD order. Pt requested not to make any future appointments until he sees Dr. Lucas Dotson. Pt left amb.

## 2023-06-26 RX ORDER — TERAZOSIN 10 MG/1
CAPSULE ORAL
Qty: 60 CAPSULE | Refills: 0 | Status: SHIPPED | OUTPATIENT
Start: 2023-06-26

## 2023-06-26 NOTE — TELEPHONE ENCOUNTER
LOV 7/11/22 for a physical.  Future Appointments   Date Time Provider Brian Bass   7/6/2023  3:00 PM Moon Hodge MD [address]     Due for physical in July with Dr. Prentis Kussmaul. Please schedule.

## 2023-06-26 NOTE — TELEPHONE ENCOUNTER
Future Appointments   Date Time Provider Brian Joshii   7/6/2023  3:00 PM Kong Jacob MD [address]   7/10/2023  3:45 PM Jona Amaya MD [facility]     Rx sent.

## 2023-06-27 LAB — CHROMOGRANIN A: 1193 NG/ML

## 2023-07-12 ENCOUNTER — OFFICE VISIT (OUTPATIENT)
Dept: FAMILY MEDICINE CLINIC | Facility: CLINIC | Age: 53
End: 2023-07-12
Payer: COMMERCIAL

## 2023-07-12 VITALS
RESPIRATION RATE: 18 BRPM | WEIGHT: 219 LBS | TEMPERATURE: 96 F | BODY MASS INDEX: 32.44 KG/M2 | DIASTOLIC BLOOD PRESSURE: 92 MMHG | SYSTOLIC BLOOD PRESSURE: 138 MMHG | HEIGHT: 69 IN | HEART RATE: 87 BPM | OXYGEN SATURATION: 99 %

## 2023-07-12 DIAGNOSIS — M25.521 RIGHT ELBOW PAIN: ICD-10-CM

## 2023-07-12 DIAGNOSIS — Z00.00 ANNUAL PHYSICAL EXAM: Primary | ICD-10-CM

## 2023-07-12 DIAGNOSIS — R73.03 PREDIABETES: ICD-10-CM

## 2023-07-12 DIAGNOSIS — I10 ESSENTIAL HYPERTENSION: ICD-10-CM

## 2023-07-12 DIAGNOSIS — G89.29 CHRONIC BILATERAL LOW BACK PAIN WITHOUT SCIATICA: ICD-10-CM

## 2023-07-12 DIAGNOSIS — Z51.81 THERAPEUTIC DRUG MONITORING: ICD-10-CM

## 2023-07-12 DIAGNOSIS — M54.50 CHRONIC BILATERAL LOW BACK PAIN WITHOUT SCIATICA: ICD-10-CM

## 2023-07-12 DIAGNOSIS — E78.5 DYSLIPIDEMIA: ICD-10-CM

## 2023-07-12 PROBLEM — D44.7 PARAGANGLIOMA (HCC): Status: RESOLVED | Noted: 2020-09-06 | Resolved: 2023-07-12

## 2023-07-12 PROBLEM — Z12.11 SPECIAL SCREENING FOR MALIGNANT NEOPLASM OF COLON: Status: RESOLVED | Noted: 2020-12-02 | Resolved: 2023-07-12

## 2023-07-12 PROCEDURE — 3080F DIAST BP >= 90 MM HG: CPT | Performed by: FAMILY MEDICINE

## 2023-07-12 PROCEDURE — 3075F SYST BP GE 130 - 139MM HG: CPT | Performed by: FAMILY MEDICINE

## 2023-07-12 PROCEDURE — G0438 PPPS, INITIAL VISIT: HCPCS | Performed by: FAMILY MEDICINE

## 2023-07-12 PROCEDURE — 99213 OFFICE O/P EST LOW 20 MIN: CPT | Performed by: FAMILY MEDICINE

## 2023-07-12 PROCEDURE — 99396 PREV VISIT EST AGE 40-64: CPT | Performed by: FAMILY MEDICINE

## 2023-07-12 PROCEDURE — 3008F BODY MASS INDEX DOCD: CPT | Performed by: FAMILY MEDICINE

## 2023-07-18 NOTE — TELEPHONE ENCOUNTER
Referral placed. [FreeTextEntry1] :   The benefits of adequate calcium intake and a daily multivitamin along with routine daily cardiovascular exercise were reviewed with the patient.\par   The patient was informed regarding the benefits of a screening colonoscopy. Patient reassured that colonoscopy pathology was benign. \par   The importance of safer-sex was discussed with the patient.\par We reviewed ASCCP/ACOG guidelines for pap smears.

## 2023-07-22 ENCOUNTER — NURSE ONLY (OUTPATIENT)
Dept: FAMILY MEDICINE CLINIC | Facility: CLINIC | Age: 53
End: 2023-07-22
Payer: COMMERCIAL

## 2023-07-22 DIAGNOSIS — Z00.00 ANNUAL PHYSICAL EXAM: ICD-10-CM

## 2023-07-22 DIAGNOSIS — E78.5 DYSLIPIDEMIA: ICD-10-CM

## 2023-07-22 DIAGNOSIS — R73.03 PREDIABETES: ICD-10-CM

## 2023-07-22 LAB
CHOLEST SERPL-MCNC: 282 MG/DL (ref ?–200)
COMPLEXED PSA SERPL-MCNC: 1.45 NG/ML (ref ?–4)
EST. AVERAGE GLUCOSE BLD GHB EST-MCNC: 128 MG/DL (ref 68–126)
FASTING PATIENT LIPID ANSWER: YES
HBA1C MFR BLD: 6.1 % (ref ?–5.7)
HDLC SERPL-MCNC: 64 MG/DL (ref 40–59)
LDLC SERPL CALC-MCNC: 206 MG/DL (ref ?–100)
NONHDLC SERPL-MCNC: 218 MG/DL (ref ?–130)
TRIGL SERPL-MCNC: 74 MG/DL (ref 30–149)
TSI SER-ACNC: 1.93 MIU/ML (ref 0.36–3.74)
VIT D+METAB SERPL-MCNC: 15.7 NG/ML (ref 30–100)
VLDLC SERPL CALC-MCNC: 16 MG/DL (ref 0–30)

## 2023-07-22 PROCEDURE — 80061 LIPID PANEL: CPT | Performed by: FAMILY MEDICINE

## 2023-07-22 PROCEDURE — 83036 HEMOGLOBIN GLYCOSYLATED A1C: CPT | Performed by: FAMILY MEDICINE

## 2023-07-22 PROCEDURE — 84443 ASSAY THYROID STIM HORMONE: CPT | Performed by: FAMILY MEDICINE

## 2023-07-22 PROCEDURE — 82306 VITAMIN D 25 HYDROXY: CPT | Performed by: FAMILY MEDICINE

## 2023-07-24 ENCOUNTER — TELEPHONE (OUTPATIENT)
Dept: FAMILY MEDICINE CLINIC | Facility: CLINIC | Age: 53
End: 2023-07-24

## 2023-07-24 DIAGNOSIS — E78.5 DYSLIPIDEMIA: Primary | ICD-10-CM

## 2023-07-24 RX ORDER — ERGOCALCIFEROL 1.25 MG/1
50000 CAPSULE ORAL WEEKLY
Qty: 12 CAPSULE | Refills: 0 | Status: SHIPPED | OUTPATIENT
Start: 2023-07-24 | End: 2023-10-22

## 2023-07-24 NOTE — TELEPHONE ENCOUNTER
Patient advised. Verbalized understanding.      Requested info be sent via [address] [facility] Leona Reveles Phone: [phone]  Fax: [phone]

## 2023-07-24 NOTE — TELEPHONE ENCOUNTER
Patient advised. Verbalized understanding.  Patient states he has never seen cardio  Place referral?

## 2023-07-24 NOTE — TELEPHONE ENCOUNTER
----- Message from MARTIN Obregon sent at 7/24/2023 12:35 PM CDT -----  A1C mildly increased. Work on diet. Recheck in 3 months for monitoring  Cholesterol elevated. Is patient still seeing cardiologist? Liver enzymes mildly increased, so should have discussion with cardiologist regarding cholesterol medication  Vitamin D low, Rx sent.  Recheck in 3 mo  PSA normal  Thyroid normal

## 2023-07-25 ENCOUNTER — TELEPHONE (OUTPATIENT)
Dept: FAMILY MEDICINE CLINIC | Facility: CLINIC | Age: 53
End: 2023-07-25

## 2023-07-25 NOTE — TELEPHONE ENCOUNTER
Parvez Pal Nurse  7/12/23 physical states he is up to date with his colonoscopy. I don't find anything. Can you please follow up to find report?     Thanks  [facility] sent to patient  The last colonoscopy I see was in 2020 and patient is overdue to repeat this

## 2023-07-26 ENCOUNTER — TELEPHONE (OUTPATIENT)
Dept: FAMILY MEDICINE CLINIC | Facility: CLINIC | Age: 53
End: 2023-07-26

## 2023-07-26 DIAGNOSIS — K63.5 HYPERPLASTIC COLONIC POLYP, UNSPECIFIED PART OF COLON: ICD-10-CM

## 2023-07-26 DIAGNOSIS — Z12.11 SCREEN FOR COLON CANCER: Primary | ICD-10-CM

## 2023-07-26 NOTE — TELEPHONE ENCOUNTER
----- Message from MARTIN Brennan sent at 7/26/2023 10:11 AM CDT -----  History of electrophoresis testing 8/4/18.  + for Sickle Cell Trait  Discussed with Dr. Kodi Khan at that time.  If he has further questions, can forward to Dr. Kodi Khan

## 2023-07-26 NOTE — TELEPHONE ENCOUNTER
----- Message from MARTIN Ovalle sent at 7/26/2023 10:11 AM CDT -----  History of electrophoresis testing 8/4/18.  + for Sickle Cell Trait  Discussed with Dr. Janel Ribeiro at that time.  If he has further questions, can forward to Dr. Janel Ribeiro

## 2023-07-26 NOTE — TELEPHONE ENCOUNTER
Patient advised. Verbalized understanding.  Not interested in follow up testing  States had colonoscopy done in 2022- in [place]-doesn't remember where  Unable to find in [address] on [address]  Google search unrevealing

## 2023-08-04 ENCOUNTER — APPOINTMENT (OUTPATIENT)
Dept: HEMATOLOGY/ONCOLOGY | Facility: HOSPITAL | Age: 53
End: 2023-08-04
Attending: INTERNAL MEDICINE
Payer: COMMERCIAL

## 2023-08-07 ENCOUNTER — OFFICE VISIT (OUTPATIENT)
Dept: HEMATOLOGY/ONCOLOGY | Facility: HOSPITAL | Age: 53
End: 2023-08-07
Attending: INTERNAL MEDICINE
Payer: COMMERCIAL

## 2023-08-07 VITALS
WEIGHT: 221 LBS | BODY MASS INDEX: 33 KG/M2 | TEMPERATURE: 98 F | SYSTOLIC BLOOD PRESSURE: 136 MMHG | OXYGEN SATURATION: 99 % | RESPIRATION RATE: 18 BRPM | DIASTOLIC BLOOD PRESSURE: 82 MMHG | HEART RATE: 81 BPM

## 2023-08-07 DIAGNOSIS — D35.01 PHEOCHROMOCYTOMA OF RIGHT ADRENAL GLAND: Primary | ICD-10-CM

## 2023-08-07 DIAGNOSIS — I15.8 OTHER SECONDARY HYPERTENSION: ICD-10-CM

## 2023-08-07 DIAGNOSIS — D44.7 PARAGANGLIOMA (HCC): ICD-10-CM

## 2023-08-07 LAB
ALBUMIN SERPL-MCNC: 4 G/DL (ref 3.4–5)
ALBUMIN/GLOB SERPL: 1.1 {RATIO} (ref 1–2)
ALP LIVER SERPL-CCNC: 94 U/L
ALT SERPL-CCNC: 65 U/L
ANION GAP SERPL CALC-SCNC: 7 MMOL/L (ref 0–18)
AST SERPL-CCNC: 43 U/L (ref 15–37)
BASOPHILS # BLD AUTO: 0.09 X10(3) UL (ref 0–0.2)
BASOPHILS NFR BLD AUTO: 1.1 %
BILIRUB SERPL-MCNC: 0.4 MG/DL (ref 0.1–2)
BUN BLD-MCNC: 17 MG/DL (ref 7–18)
CALCIUM BLD-MCNC: 9.5 MG/DL (ref 8.5–10.1)
CHLORIDE SERPL-SCNC: 109 MMOL/L (ref 98–112)
CO2 SERPL-SCNC: 24 MMOL/L (ref 21–32)
CREAT BLD-MCNC: 1.37 MG/DL
EGFRCR SERPLBLD CKD-EPI 2021: 62 ML/MIN/1.73M2 (ref 60–?)
EOSINOPHIL # BLD AUTO: 0.17 X10(3) UL (ref 0–0.7)
EOSINOPHIL NFR BLD AUTO: 2.2 %
ERYTHROCYTE [DISTWIDTH] IN BLOOD BY AUTOMATED COUNT: 12.2 %
FASTING STATUS PATIENT QL REPORTED: NO
GLOBULIN PLAS-MCNC: 3.7 G/DL (ref 2.8–4.4)
GLUCOSE BLD-MCNC: 145 MG/DL (ref 70–99)
HCT VFR BLD AUTO: 39.9 %
HGB BLD-MCNC: 13.3 G/DL
IMM GRANULOCYTES # BLD AUTO: 0.02 X10(3) UL (ref 0–1)
IMM GRANULOCYTES NFR BLD: 0.3 %
LYMPHOCYTES # BLD AUTO: 3.73 X10(3) UL (ref 1–4)
LYMPHOCYTES NFR BLD AUTO: 47.6 %
MCH RBC QN AUTO: 29.4 PG (ref 26–34)
MCHC RBC AUTO-ENTMCNC: 33.3 G/DL (ref 31–37)
MCV RBC AUTO: 88.3 FL
MONOCYTES # BLD AUTO: 0.59 X10(3) UL (ref 0.1–1)
MONOCYTES NFR BLD AUTO: 7.5 %
NEUTROPHILS # BLD AUTO: 3.23 X10 (3) UL (ref 1.5–7.7)
NEUTROPHILS # BLD AUTO: 3.23 X10(3) UL (ref 1.5–7.7)
NEUTROPHILS NFR BLD AUTO: 41.3 %
OSMOLALITY SERPL CALC.SUM OF ELEC: 294 MOSM/KG (ref 275–295)
PLATELET # BLD AUTO: 292 10(3)UL (ref 150–450)
POTASSIUM SERPL-SCNC: 4 MMOL/L (ref 3.5–5.1)
PROT SERPL-MCNC: 7.7 G/DL (ref 6.4–8.2)
RBC # BLD AUTO: 4.52 X10(6)UL
SODIUM SERPL-SCNC: 140 MMOL/L (ref 136–145)
WBC # BLD AUTO: 7.8 X10(3) UL (ref 4–11)

## 2023-08-07 PROCEDURE — 85025 COMPLETE CBC W/AUTO DIFF WBC: CPT | Performed by: INTERNAL MEDICINE

## 2023-08-07 PROCEDURE — 86316 IMMUNOASSAY TUMOR OTHER: CPT | Performed by: INTERNAL MEDICINE

## 2023-08-07 PROCEDURE — 36415 COLL VENOUS BLD VENIPUNCTURE: CPT

## 2023-08-07 PROCEDURE — 80053 COMPREHEN METABOLIC PANEL: CPT | Performed by: INTERNAL MEDICINE

## 2023-08-07 PROCEDURE — 96372 THER/PROPH/DIAG INJ SC/IM: CPT

## 2023-08-07 RX ORDER — LANREOTIDE ACETATE 120 MG/.5ML
120 INJECTION SUBCUTANEOUS ONCE
OUTPATIENT
Start: 2023-08-14

## 2023-08-07 RX ORDER — LANREOTIDE ACETATE 120 MG/.5ML
120 INJECTION SUBCUTANEOUS ONCE
Status: COMPLETED | OUTPATIENT
Start: 2023-08-07 | End: 2023-08-07

## 2023-08-07 RX ADMIN — LANREOTIDE ACETATE 120 MG: 120 INJECTION SUBCUTANEOUS at 16:01:00

## 2023-08-07 NOTE — PROGRESS NOTES
Education Record    Learner:  Patient    Disease / Diagnosis: Paraganglioma    Barriers / Limitations:  None   Comments:    Method:  Discussion   Comments:    General Topics:  Medication and Plan of care reviewed   Comments:    Outcome:  Shows understanding   Comments:    Here for follow up and establishing care with Dr. Alex Ba. He is doing well on lanreotide. No pain. Appetite and energy are good. His blood pressure is controlled on current medication regimen.

## 2023-08-07 NOTE — PROGRESS NOTES
Education Record    Learner:  Patient    Disease / Diagnosis: Here for lanreotide    Barriers / Limitations:  None   Comments:    Method:  Brief focused and Discussion   Comments:    General Topics:  Medication, Side effects and symptom management, and Plan of care reviewed   Comments:    Outcome:  Shows understanding   Comments:    Lanreotide given per MD order without incident. Tolerated well. Per Dr Alecia King RN, MRI is scheduled for 8/31 @ 0930. Pt informed. Pt states he would like to go back to Fridays for his treatment days. Monthly labs/injections and will see MD in 6 months. Appointments requested. Pt uses MyChart. Discharged home in stable condition, no new complaints.

## 2023-08-09 LAB — CHROMOGRANIN A: 1352 NG/ML

## 2023-08-21 RX ORDER — TERAZOSIN 10 MG/1
10 CAPSULE ORAL 2 TIMES DAILY
Qty: 60 CAPSULE | Refills: 0 | Status: SHIPPED | OUTPATIENT
Start: 2023-08-21

## 2023-08-21 NOTE — TELEPHONE ENCOUNTER
Hypertension Medications Protocol [id number]/21/2023 02:57 PM   Protocol Details CMP or BMP in past 12 months    Last serum creatinine< 2.0    Appointment in past 6 or next 3 months

## 2023-08-31 ENCOUNTER — HOSPITAL ENCOUNTER (OUTPATIENT)
Dept: MRI IMAGING | Facility: HOSPITAL | Age: 53
Discharge: HOME | End: 2023-08-31
Attending: INTERNAL MEDICINE
Payer: COMMERCIAL

## 2023-08-31 DIAGNOSIS — D35.01 PHEOCHROMOCYTOMA OF RIGHT ADRENAL GLAND: ICD-10-CM

## 2023-08-31 PROCEDURE — 74183 MRI ABD W/O CNTR FLWD CNTR: CPT | Performed by: INTERNAL MEDICINE

## 2023-08-31 PROCEDURE — A9575 INJ GADOTERATE MEGLUMI 0.1ML: HCPCS | Performed by: INTERNAL MEDICINE

## 2023-08-31 RX ORDER — GADOTERATE MEGLUMINE 376.9 MG/ML
20 INJECTION INTRAVENOUS
Status: COMPLETED | OUTPATIENT
Start: 2023-08-31 | End: 2023-08-31

## 2023-08-31 RX ADMIN — GADOTERATE MEGLUMINE 20 ML: 376.9 INJECTION INTRAVENOUS at 11:31:00

## 2023-09-08 ENCOUNTER — OFFICE VISIT (OUTPATIENT)
Dept: HEMATOLOGY/ONCOLOGY | Facility: HOSPITAL | Age: 53
End: 2023-09-08
Attending: INTERNAL MEDICINE
Payer: COMMERCIAL

## 2023-09-08 VITALS
WEIGHT: 223.63 LBS | HEART RATE: 73 BPM | DIASTOLIC BLOOD PRESSURE: 72 MMHG | SYSTOLIC BLOOD PRESSURE: 124 MMHG | TEMPERATURE: 98 F | OXYGEN SATURATION: 98 % | BODY MASS INDEX: 31.31 KG/M2 | RESPIRATION RATE: 16 BRPM | HEIGHT: 70.98 IN

## 2023-09-08 DIAGNOSIS — D35.01 PHEOCHROMOCYTOMA OF RIGHT ADRENAL GLAND: Primary | ICD-10-CM

## 2023-09-08 DIAGNOSIS — D44.7 PARAGANGLIOMA (HCC): ICD-10-CM

## 2023-09-08 PROCEDURE — 36415 COLL VENOUS BLD VENIPUNCTURE: CPT

## 2023-09-08 PROCEDURE — 86316 IMMUNOASSAY TUMOR OTHER: CPT

## 2023-09-08 PROCEDURE — 96372 THER/PROPH/DIAG INJ SC/IM: CPT

## 2023-09-08 RX ORDER — LANREOTIDE ACETATE 120 MG/.5ML
120 INJECTION SUBCUTANEOUS ONCE
OUTPATIENT
Start: 2023-10-06

## 2023-09-08 RX ORDER — LANREOTIDE ACETATE 120 MG/.5ML
120 INJECTION SUBCUTANEOUS ONCE
Status: COMPLETED | OUTPATIENT
Start: 2023-09-08 | End: 2023-09-08

## 2023-09-08 RX ADMIN — LANREOTIDE ACETATE 120 MG: 120 INJECTION SUBCUTANEOUS at 15:33:00

## 2023-09-08 NOTE — PROGRESS NOTES
Education Record    Learner:  Patient    Disease / Diagnosis: Here for Lanreotide    Barriers / Limitations:  None   Comments:    Method:  Discussion   Comments:    General Topics:  Medication, Side effects and symptom management, and Plan of care reviewed   Comments:    Outcome:  Shows understanding   Comments:    Lanreotide given per MD order without incident. Tolerated well. Reviewed next appointments. Uses MyChart. Discharged home in stable condition, no new complaints.

## 2023-09-11 LAB — CHROMOGRANIN A: 1204 NG/ML

## 2023-09-13 RX ORDER — TERAZOSIN 10 MG/1
10 CAPSULE ORAL 2 TIMES DAILY
Qty: 180 CAPSULE | Refills: 0 | Status: SHIPPED | OUTPATIENT
Start: 2023-09-13

## 2023-10-06 ENCOUNTER — OFFICE VISIT (OUTPATIENT)
Dept: HEMATOLOGY/ONCOLOGY | Facility: HOSPITAL | Age: 53
End: 2023-10-06
Attending: INTERNAL MEDICINE
Payer: COMMERCIAL

## 2023-10-06 VITALS
WEIGHT: 226.19 LBS | RESPIRATION RATE: 16 BRPM | TEMPERATURE: 97 F | BODY MASS INDEX: 31.67 KG/M2 | HEIGHT: 70.98 IN | DIASTOLIC BLOOD PRESSURE: 77 MMHG | HEART RATE: 68 BPM | OXYGEN SATURATION: 97 % | SYSTOLIC BLOOD PRESSURE: 120 MMHG

## 2023-10-06 DIAGNOSIS — D35.01 PHEOCHROMOCYTOMA OF RIGHT ADRENAL GLAND: ICD-10-CM

## 2023-10-06 DIAGNOSIS — D44.7 PARAGANGLIOMA (HCC): Primary | ICD-10-CM

## 2023-10-06 PROCEDURE — 36415 COLL VENOUS BLD VENIPUNCTURE: CPT

## 2023-10-06 PROCEDURE — 86316 IMMUNOASSAY TUMOR OTHER: CPT

## 2023-10-06 PROCEDURE — 96372 THER/PROPH/DIAG INJ SC/IM: CPT

## 2023-10-06 RX ORDER — LANREOTIDE ACETATE 120 MG/.5ML
120 INJECTION SUBCUTANEOUS ONCE
OUTPATIENT
Start: 2023-11-03

## 2023-10-06 RX ORDER — LANREOTIDE ACETATE 120 MG/.5ML
120 INJECTION SUBCUTANEOUS ONCE
Status: COMPLETED | OUTPATIENT
Start: 2023-10-06 | End: 2023-10-06

## 2023-10-06 RX ADMIN — LANREOTIDE ACETATE 120 MG: 120 INJECTION SUBCUTANEOUS at 15:26:00

## 2023-10-06 NOTE — PROGRESS NOTES
Education Record    Learner:  Patient    Disease / Diagnosis: Paraganglioma    Barriers / Limitations:  None   Comments:    Method:  Brief focused and Reinforcement   Comments:    General Topics:  Plan of care reviewed   Comments:    Outcome:  Shows understanding   Comments:

## 2023-10-10 LAB — CHROMOGRANIN A: 1495 NG/ML

## 2023-11-03 ENCOUNTER — OFFICE VISIT (OUTPATIENT)
Dept: HEMATOLOGY/ONCOLOGY | Facility: HOSPITAL | Age: 53
End: 2023-11-03
Attending: INTERNAL MEDICINE
Payer: COMMERCIAL

## 2023-11-03 VITALS
RESPIRATION RATE: 16 BRPM | OXYGEN SATURATION: 97 % | SYSTOLIC BLOOD PRESSURE: 168 MMHG | BODY MASS INDEX: 31.08 KG/M2 | HEART RATE: 73 BPM | HEIGHT: 70.98 IN | DIASTOLIC BLOOD PRESSURE: 110 MMHG | TEMPERATURE: 98 F | WEIGHT: 222 LBS

## 2023-11-03 DIAGNOSIS — D44.7 PARAGANGLIOMA (HCC): Primary | ICD-10-CM

## 2023-11-03 DIAGNOSIS — D35.01 PHEOCHROMOCYTOMA OF RIGHT ADRENAL GLAND: ICD-10-CM

## 2023-11-03 PROCEDURE — 86316 IMMUNOASSAY TUMOR OTHER: CPT

## 2023-11-03 PROCEDURE — 96372 THER/PROPH/DIAG INJ SC/IM: CPT

## 2023-11-03 PROCEDURE — 36415 COLL VENOUS BLD VENIPUNCTURE: CPT

## 2023-11-03 RX ORDER — LANREOTIDE ACETATE 120 MG/.5ML
120 INJECTION SUBCUTANEOUS ONCE
OUTPATIENT
Start: 2023-12-01

## 2023-11-03 RX ORDER — LANREOTIDE ACETATE 120 MG/.5ML
120 INJECTION SUBCUTANEOUS ONCE
Status: COMPLETED | OUTPATIENT
Start: 2023-11-03 | End: 2023-11-03

## 2023-11-03 RX ADMIN — LANREOTIDE ACETATE 120 MG: 120 INJECTION SUBCUTANEOUS at 15:22:00

## 2023-11-03 NOTE — PROGRESS NOTES
Education Record    Learner:  Patient    Disease / Diagnosis: Pheochromocytoma    Barriers / Limitations:  None   Comments:    Method:  Brief focused   Comments:    General Topics:  Plan of care reviewed   Comments:    Outcome:  Shows understanding   Comments:

## 2023-11-06 LAB — CHROMOGRANIN A: 1172 NG/ML

## 2023-12-01 ENCOUNTER — OFFICE VISIT (OUTPATIENT)
Dept: HEMATOLOGY/ONCOLOGY | Facility: HOSPITAL | Age: 53
End: 2023-12-01
Attending: INTERNAL MEDICINE
Payer: COMMERCIAL

## 2023-12-01 VITALS
SYSTOLIC BLOOD PRESSURE: 158 MMHG | OXYGEN SATURATION: 98 % | TEMPERATURE: 98 F | WEIGHT: 217.81 LBS | DIASTOLIC BLOOD PRESSURE: 92 MMHG | BODY MASS INDEX: 30 KG/M2 | HEART RATE: 74 BPM

## 2023-12-01 DIAGNOSIS — D35.01 PHEOCHROMOCYTOMA OF RIGHT ADRENAL GLAND: ICD-10-CM

## 2023-12-01 DIAGNOSIS — D44.7 PARAGANGLIOMA (HCC): Primary | ICD-10-CM

## 2023-12-01 PROCEDURE — 86316 IMMUNOASSAY TUMOR OTHER: CPT

## 2023-12-01 PROCEDURE — 36415 COLL VENOUS BLD VENIPUNCTURE: CPT

## 2023-12-01 PROCEDURE — 96372 THER/PROPH/DIAG INJ SC/IM: CPT

## 2023-12-01 RX ORDER — LANREOTIDE ACETATE 120 MG/.5ML
120 INJECTION SUBCUTANEOUS ONCE
Status: COMPLETED | OUTPATIENT
Start: 2023-12-01 | End: 2023-12-01

## 2023-12-01 RX ORDER — LANREOTIDE ACETATE 120 MG/.5ML
120 INJECTION SUBCUTANEOUS ONCE
OUTPATIENT
Start: 2023-12-29

## 2023-12-01 RX ADMIN — LANREOTIDE ACETATE 120 MG: 120 INJECTION SUBCUTANEOUS at 15:30:00

## 2023-12-01 NOTE — PROGRESS NOTES
Education Record    Learner:  Patient    Disease / Diagnosis: paraganglioma    Barriers / Limitations:  None   Comments:    Method:  Brief focused and Reinforcement   Comments:    General Topics:  Plan of care reviewed   Comments:    Outcome:  Shows understanding   Comments:    Patient tolerated Lanreotide injection and discharged in stable condition.

## 2023-12-04 LAB — CHROMOGRANIN A: 1410 NG/ML

## 2023-12-12 RX ORDER — TERAZOSIN 10 MG/1
10 CAPSULE ORAL 2 TIMES DAILY
Qty: 180 CAPSULE | Refills: 0 | Status: SHIPPED | OUTPATIENT
Start: 2023-12-12

## 2023-12-29 ENCOUNTER — OFFICE VISIT (OUTPATIENT)
Dept: HEMATOLOGY/ONCOLOGY | Facility: HOSPITAL | Age: 53
End: 2023-12-29
Attending: INTERNAL MEDICINE
Payer: COMMERCIAL

## 2023-12-29 DIAGNOSIS — D44.7 PARAGANGLIOMA (HCC): Primary | ICD-10-CM

## 2023-12-29 DIAGNOSIS — D35.01 PHEOCHROMOCYTOMA OF RIGHT ADRENAL GLAND: ICD-10-CM

## 2023-12-29 PROCEDURE — 36415 COLL VENOUS BLD VENIPUNCTURE: CPT

## 2023-12-29 PROCEDURE — 96372 THER/PROPH/DIAG INJ SC/IM: CPT

## 2023-12-29 PROCEDURE — 86316 IMMUNOASSAY TUMOR OTHER: CPT

## 2023-12-29 RX ORDER — LANREOTIDE ACETATE 120 MG/.5ML
120 INJECTION SUBCUTANEOUS ONCE
Status: COMPLETED | OUTPATIENT
Start: 2023-12-29 | End: 2023-12-29

## 2023-12-29 RX ORDER — LANREOTIDE ACETATE 120 MG/.5ML
120 INJECTION SUBCUTANEOUS ONCE
OUTPATIENT
Start: 2024-01-26

## 2023-12-29 RX ADMIN — LANREOTIDE ACETATE 120 MG: 120 INJECTION SUBCUTANEOUS at 15:20:00

## 2023-12-29 NOTE — PROGRESS NOTES
Education Record    Learner:  Patient    Disease / Diagnosis: Paraganglioma    Barriers / Limitations:  None   Comments:    Method:  Brief focused   Comments:    General Topics:  Plan of care reviewed   Comments:    Outcome:  Shows understanding   Comments:

## 2024-01-03 LAB — CHROMOGRANIN A: 1335 NG/ML

## 2024-01-26 ENCOUNTER — OFFICE VISIT (OUTPATIENT)
Dept: HEMATOLOGY/ONCOLOGY | Facility: HOSPITAL | Age: 54
End: 2024-01-26
Attending: INTERNAL MEDICINE
Payer: COMMERCIAL

## 2024-01-26 VITALS
OXYGEN SATURATION: 100 % | RESPIRATION RATE: 18 BRPM | DIASTOLIC BLOOD PRESSURE: 99 MMHG | BODY MASS INDEX: 29.93 KG/M2 | HEART RATE: 73 BPM | SYSTOLIC BLOOD PRESSURE: 180 MMHG | TEMPERATURE: 99 F | WEIGHT: 213.81 LBS | HEIGHT: 70.98 IN

## 2024-01-26 DIAGNOSIS — D44.7 PARAGANGLIOMA (HCC): Primary | ICD-10-CM

## 2024-01-26 DIAGNOSIS — D35.01 PHEOCHROMOCYTOMA OF RIGHT ADRENAL GLAND: ICD-10-CM

## 2024-01-26 PROCEDURE — 86316 IMMUNOASSAY TUMOR OTHER: CPT

## 2024-01-26 PROCEDURE — 96372 THER/PROPH/DIAG INJ SC/IM: CPT

## 2024-01-26 PROCEDURE — 36415 COLL VENOUS BLD VENIPUNCTURE: CPT

## 2024-01-26 RX ORDER — LANREOTIDE ACETATE 120 MG/.5ML
120 INJECTION SUBCUTANEOUS ONCE
OUTPATIENT
Start: 2024-02-23

## 2024-01-26 RX ORDER — LANREOTIDE ACETATE 120 MG/.5ML
120 INJECTION SUBCUTANEOUS ONCE
Status: COMPLETED | OUTPATIENT
Start: 2024-01-26 | End: 2024-01-26

## 2024-01-26 RX ADMIN — LANREOTIDE ACETATE 120 MG: 120 INJECTION SUBCUTANEOUS at 15:23:00

## 2024-01-26 NOTE — PROGRESS NOTES
Education Record    Learner:  Patient    Disease / Diagnosis: paraganglioma    Barriers / Limitations:  None    Method:  Brief focused, printed material and  reinforcement    General Topics:  Plan of care reviewed    Outcome:  Shows understanding      Inj given. BP elevated, but asymptomatic. Historically elevated. Reviewed appts with pt

## 2024-01-29 LAB — CHROMOGRANIN A: 1095 NG/ML

## 2024-02-27 ENCOUNTER — NURSE ONLY (OUTPATIENT)
Dept: HEMATOLOGY/ONCOLOGY | Facility: HOSPITAL | Age: 54
End: 2024-02-27

## 2024-02-29 ENCOUNTER — OFFICE VISIT (OUTPATIENT)
Dept: HEMATOLOGY/ONCOLOGY | Facility: HOSPITAL | Age: 54
End: 2024-02-29
Attending: INTERNAL MEDICINE
Payer: COMMERCIAL

## 2024-02-29 VITALS
SYSTOLIC BLOOD PRESSURE: 164 MMHG | HEART RATE: 71 BPM | OXYGEN SATURATION: 98 % | RESPIRATION RATE: 16 BRPM | DIASTOLIC BLOOD PRESSURE: 96 MMHG | BODY MASS INDEX: 31 KG/M2 | TEMPERATURE: 98 F | WEIGHT: 219.5 LBS

## 2024-02-29 DIAGNOSIS — D35.01 PHEOCHROMOCYTOMA OF RIGHT ADRENAL GLAND: Primary | ICD-10-CM

## 2024-02-29 DIAGNOSIS — M25.521 RIGHT ELBOW PAIN: ICD-10-CM

## 2024-02-29 DIAGNOSIS — D44.7 PARAGANGLIOMA (HCC): ICD-10-CM

## 2024-02-29 PROCEDURE — 99215 OFFICE O/P EST HI 40 MIN: CPT | Performed by: INTERNAL MEDICINE

## 2024-02-29 PROCEDURE — 96372 THER/PROPH/DIAG INJ SC/IM: CPT

## 2024-02-29 RX ORDER — LANREOTIDE ACETATE 120 MG/.5ML
120 INJECTION SUBCUTANEOUS ONCE
Status: COMPLETED | OUTPATIENT
Start: 2024-02-29 | End: 2024-02-29

## 2024-02-29 RX ORDER — LANREOTIDE ACETATE 120 MG/.5ML
120 INJECTION SUBCUTANEOUS ONCE
OUTPATIENT
Start: 2024-03-21

## 2024-02-29 RX ADMIN — LANREOTIDE ACETATE 120 MG: 120 INJECTION SUBCUTANEOUS at 15:23:00

## 2024-02-29 NOTE — PROGRESS NOTES
Hematology/Oncology Clinic Follow Up Visit    Patient Name: Jazlyn Hays  Medical Record Number: [id number]    YOB: 1970   PCP: Isabella Hunter MD    Reason for Consultation:  Jazlyn Hays was seen today for the diagnosis of unresectable pheochromocytoma    Oncologic History:  9/2020: s/p biopsy of R adrenal gland mass with path consistent with pheochromocytoma. Attempted resection but unresectable due to overlying vascular structures  10/2020: Dotatate PET with increased uptake only at retroperitoneal mass consistent with neuroendocrine tumor.   12/2020: underwent exploration of retroperitoneum with irreversible electroporation of tumor. Started on lanreotide. Was also recommended RT but he declined.    History of Present Illness:      54 y/o M PMH unresectable pheochromocytoma on lanreotide who presents for follow up.    - denies any issues, no headaches or palpitations  - on amlodipine, terazosin and metoprolol or blood pressure; working with PCP for anti-hypertensive control  - reports R elbow pain that has been going on for the past 6 months that he cannot flex beyond a certain point. He is not sure if he has injured this. Has some elbow swelling as well    Past Medical History:  Past Medical History:   Diagnosis Date    Cancer (HCC)     paraganglioma    Elevated CK     High blood pressure     Other and unspecified hyperlipidemia     Painful urination     Pre-diabetes     Unspecified essential hypertension      Past Surgical History:   Procedure Laterality Date    APPENDECTOMY      APPENDECTOMY      OTHER  12/11/2020    Exploration retroperitoneum, irreversible electroporation/nanoknife retroperitoneal tumor, intraoperative ultrasound.       Home Medications:   TERAZOSIN 10 MG Oral Cap Take 1 capsule by mouth twice daily 180 capsule 0    metoprolol succinate  MG Oral Tablet 24 Hr 2 (two) times a day.      amLODIPine 5 MG Oral Tab Take 1 tablet (5 mg total) by mouth in the morning  and 1 tablet (5 mg total) before bedtime. 180 tablet 11       Allergies:   No Known Allergies    Psychosocial History:  Social History     Socioeconomic History    Marital status:      Spouse name: Not on file    Number of children: 2    Years of education: Not on file    Highest education level: Not on file   Occupational History    Occupation: drives trucks and delivery   Tobacco Use    Smoking status: Some Days    Smokeless tobacco: Never   Vaping Use    Vaping Use: Never used   Substance and Sexual Activity    Alcohol use: Yes     Comment: occ    Drug use: No    Sexual activity: Not Currently     Partners: Female   Other Topics Concern     Service Not Asked    Blood Transfusions Not Asked    Caffeine Concern Yes     Comment: 1 cup a day    Occupational Exposure Not Asked    Hobby Hazards Not Asked    Sleep Concern Not Asked    Stress Concern Not Asked    Weight Concern Not Asked    Special Diet Not Asked    Back Care Not Asked    Exercise Yes     Comment: daily    Bike Helmet Not Asked    Seat Belt Not Asked    Self-Exams Not Asked   Social History Narrative    , lives alone    Has 2 sons, lives close by     Social Determinants of Health     Financial Resource Strain: Not on file   Food Insecurity: Not on file   Transportation Needs: Not on file   Physical Activity: Not on file   Stress: Not on file   Social Connections: Not on file   Housing Stability: Not on file       Family Medical History:  Family History   Problem Relation Age of Onset    Hypertension Mother     Diabetes Mother     Diabetes Brother     Cancer Neg     Lipids Neg     Heart Disorder Neg     Stroke Neg        Review of Systems:  A 10-point ROS was done with pertinent positives and negative per the HPI    Vital Signs:  Height: (P) 180.3 cm (5' 10.98\") (02/29 1501)  Weight: 99.6 kg (219 lb 8 oz) (02/29 1501)  BSA (Calculated - sq m): (P) 2.19 sq meters (02/29 1501)  Pulse: 71 (02/29 1501)  BP: 164/96 (02/29 1501)  Temp:  97.8 °F (36.6 °C) (02/29 1501)  Do Not Use - Resp Rate: --  SpO2: 98 % (02/29 1501)    Wt Readings from Last 6 Encounters:   02/29/24 99.6 kg (219 lb 8 oz)   01/26/24 97 kg (213 lb 12.8 oz)   12/01/23 98.8 kg (217 lb 12.8 oz)   11/03/23 100.7 kg (222 lb)   10/06/23 102.6 kg (226 lb 3.2 oz)   09/08/23 101.4 kg (223 lb 9.6 oz)       ECOG PS: 1    Physical Examination:  General: Patient is alert and oriented, not in acute distress  Psych: Mood and affect are appropriate  Eyes: EOMI, PERRL  ENT: Oropharynx is clear, no adenopathy  CV: no LE edema  Respiratory: Non-labored respirations  Neurological: Grossly intact   MSK: Unable to flex elbow further beyond a certain point. Some mild swelling on lateral side of R elbow  Skin: no rashes or petechiae    Laboratory:  Lab Results   Component Value Date    WBC 7.8 08/07/2023    WBC 8.0 06/23/2023    WBC 10.3 03/31/2023    HGB 13.3 08/07/2023    HGB 14.1 06/23/2023    HGB 13.5 03/31/2023    HCT 39.9 08/07/2023    MCV 88.3 08/07/2023    MCH 29.4 08/07/2023    MCHC 33.3 08/07/2023    RDW 12.2 08/07/2023    .0 08/07/2023    .0 06/23/2023    .0 03/31/2023     Lab Results   Component Value Date     (H) 08/07/2023    BUN 17 08/07/2023    BUNCREA 10.1 07/09/2021    CREATSERUM 1.37 (H) 08/07/2023    CREATSERUM 1.32 (H) 06/23/2023    CREATSERUM 1.45 (H) 03/31/2023    ANIONGAP 7 08/07/2023    GFR 70 06/21/2017    GFRNAA 65 10/07/2021    GFRAA 75 10/07/2021    CA 9.5 08/07/2023    OSMOCALC 294 08/07/2023    ALKPHO 94 08/07/2023    AST 43 (H) 08/07/2023    ALT 65 (H) 08/07/2023    BILT 0.4 08/07/2023    TP 7.7 08/07/2023    ALB 4.0 08/07/2023    GLOBULIN 3.7 08/07/2023    AGRATIO 1.5 09/14/2019     08/07/2023    K 4.0 08/07/2023     08/07/2023    CO2 24.0 08/07/2023     No results found for: \"PTT\", \"PT\", \"INR\"      Impression & Plan:     Unresectable pheochromocytoma  - Unresectable due to overlying vascular structures, and has received irreversible  electroporation  - has not received RT to this mass; he declined  - restaging MRI abdomen has been stable; continue monthly lanreotide  - discussed with pt to repeat MRI abdomen to ensure disease stability  - if there is growth, pt will need RT for disease control; previously discussed    Secondary hypertension  - on terazosin, metoprolol and amlodipine, not at goal today    R elbow pain  - obtain elbow MRI. May need orthopedics referral    Follow up: 6 months    Jason Munoz  Hematology/Medical Oncology  [facility]

## 2024-02-29 NOTE — PROGRESS NOTES
Education Record    Learner:  Patient    Disease / Diagnosis:     Barriers / Limitations:  None   Comments:    Method:  Discussion   Comments:    General Topics:  Medication and Plan of care reviewed   Comments:    Outcome:  Shows understanding   Comments:    Paraganglioma. Feels great without any concerns.

## 2024-03-06 LAB — CHROMOGRANIN A: 1211 NG/ML

## 2024-03-29 ENCOUNTER — OFFICE VISIT (OUTPATIENT)
Dept: HEMATOLOGY/ONCOLOGY | Facility: HOSPITAL | Age: 54
End: 2024-03-29
Attending: INTERNAL MEDICINE
Payer: COMMERCIAL

## 2024-03-29 VITALS
SYSTOLIC BLOOD PRESSURE: 152 MMHG | RESPIRATION RATE: 16 BRPM | OXYGEN SATURATION: 100 % | WEIGHT: 212.31 LBS | TEMPERATURE: 97 F | BODY MASS INDEX: 29.72 KG/M2 | HEART RATE: 82 BPM | HEIGHT: 70.98 IN | DIASTOLIC BLOOD PRESSURE: 88 MMHG

## 2024-03-29 DIAGNOSIS — D35.01 PHEOCHROMOCYTOMA OF RIGHT ADRENAL GLAND: ICD-10-CM

## 2024-03-29 DIAGNOSIS — D44.7 PARAGANGLIOMA (HCC): Primary | ICD-10-CM

## 2024-03-29 PROCEDURE — 86316 IMMUNOASSAY TUMOR OTHER: CPT

## 2024-03-29 PROCEDURE — 96372 THER/PROPH/DIAG INJ SC/IM: CPT

## 2024-03-29 PROCEDURE — 36415 COLL VENOUS BLD VENIPUNCTURE: CPT

## 2024-03-29 RX ORDER — LANREOTIDE ACETATE 120 MG/.5ML
120 INJECTION SUBCUTANEOUS ONCE
Status: CANCELLED | OUTPATIENT
Start: 2024-03-29

## 2024-03-29 RX ORDER — LANREOTIDE ACETATE 120 MG/.5ML
120 INJECTION SUBCUTANEOUS ONCE
Status: COMPLETED | OUTPATIENT
Start: 2024-03-29 | End: 2024-03-29

## 2024-03-29 RX ADMIN — LANREOTIDE ACETATE 120 MG: 120 INJECTION SUBCUTANEOUS at 14:52:00

## 2024-03-29 NOTE — PROGRESS NOTES
Education Record    Learner:  Patient    Disease / Diagnosis: pheochromocytoma    Barriers / Limitations:  None   Comments:    Method:  Discussion   Comments:    General Topics:  Plan of care reviewed   Comments:    Outcome:  Shows understanding   Comments:    Patient states feeling well with no specific complaints or questions today. Patient tolerated injection well and is aware of next appointment date/time. Patient left treatment center in stable condition.

## 2024-04-01 LAB — CHROMOGRANIN A: 1278 NG/ML

## 2024-04-05 NOTE — TELEPHONE ENCOUNTER
LOV 9/29/21 for edema. Hypertension Medications Protocol Passed 11/10/2021 12:36 PM   Protocol Details  CMP or BMP in past 12 months    Last serum creatinine< 2.0    Appointment in past 6 or next 3 months     No future appointments. Rx sent. No

## 2024-04-17 ENCOUNTER — TELEPHONE (OUTPATIENT)
Dept: FAMILY MEDICINE CLINIC | Facility: CLINIC | Age: 54
End: 2024-04-17

## 2024-04-17 NOTE — TELEPHONE ENCOUNTER
Due for BP check  Could you please call to schedule    Future Appointments   Date Time Provider Department Center   4/26/2024  3:30 PM [facility] [place] RN5 [facility]   5/24/2024  3:30 PM [facility] TX RN5 [facility]   6/21/2024  3:30 PM [facility] TX RN3 [facility]   7/29/2024  3:30 PM [facility] TX RN3 [facility]   8/15/2024  3:15 PM Jason Munoz MD [facility] HEM ONC [facility]   8/15/2024  3:30 PM E[facility] TX RN3 [facility]

## 2024-04-18 ENCOUNTER — OFFICE VISIT (OUTPATIENT)
Dept: FAMILY MEDICINE CLINIC | Facility: CLINIC | Age: 54
End: 2024-04-18
Payer: COMMERCIAL

## 2024-04-18 VITALS
TEMPERATURE: 97 F | WEIGHT: 216 LBS | BODY MASS INDEX: 30 KG/M2 | OXYGEN SATURATION: 99 % | HEART RATE: 76 BPM | SYSTOLIC BLOOD PRESSURE: 136 MMHG | DIASTOLIC BLOOD PRESSURE: 84 MMHG

## 2024-04-18 DIAGNOSIS — D35.01 PHEOCHROMOCYTOMA OF RIGHT ADRENAL GLAND: ICD-10-CM

## 2024-04-18 DIAGNOSIS — E66.9 OBESITY (BMI 30-39.9): ICD-10-CM

## 2024-04-18 DIAGNOSIS — R79.89 ELEVATED LIVER FUNCTION TESTS: ICD-10-CM

## 2024-04-18 DIAGNOSIS — R73.03 PREDIABETES: ICD-10-CM

## 2024-04-18 DIAGNOSIS — R73.09 ELEVATED HEMOGLOBIN A1C: ICD-10-CM

## 2024-04-18 DIAGNOSIS — I10 UNCONTROLLED HYPERTENSION: Primary | ICD-10-CM

## 2024-04-18 DIAGNOSIS — E78.5 DYSLIPIDEMIA: ICD-10-CM

## 2024-04-18 RX ORDER — METOPROLOL SUCCINATE 100 MG/1
100 TABLET, EXTENDED RELEASE ORAL 2 TIMES DAILY
Qty: 180 TABLET | Refills: 1 | Status: SHIPPED | OUTPATIENT
Start: 2024-04-18 | End: 2024-10-15

## 2024-04-18 RX ORDER — AMLODIPINE BESYLATE 5 MG/1
5 TABLET ORAL 2 TIMES DAILY
Qty: 180 TABLET | Refills: 1 | Status: SHIPPED | OUTPATIENT
Start: 2024-04-18 | End: 2024-07-17

## 2024-04-18 RX ORDER — TERAZOSIN 10 MG/1
10 CAPSULE ORAL 2 TIMES DAILY
Qty: 180 CAPSULE | Refills: 1 | Status: SHIPPED | OUTPATIENT
Start: 2024-04-18 | End: 2024-07-17

## 2024-04-18 NOTE — PROGRESS NOTES
CHIEF COMPLAINT:    Chief Complaint   Patient presents with    Follow - Up     Per pt- bp check  Has not taken bp meds today, does check bp at home       HISTORY OF PRESENT ILLNESS:    Jazlyn presents today, 2024, for blood pressure follow up.   Current medications include :  amlodipine 5mg BID  Metoprolol 100mg BID  Terazosin  10mg BID  Patients takes all medications  around 3am and 5pm    Patient reports no medication today.    Patient reports 128/89 at home- advised to continue monitoring blood pressure at home and send 3-5 days of blood pressure readings.    BP Readings from Last 2 Encounters:   24 136/84   24 152/88         ALLERGIES:  No Known Allergies    CURRENT MEDICATIONS:  Current Outpatient Medications   Medication Sig Dispense Refill    amLODIPine 5 MG Oral Tab Take 1 tablet (5 mg total) by mouth in the morning and 1 tablet (5 mg total) before bedtime. 180 tablet 1    metoprolol succinate  MG Oral Tablet 24 Hr Take 1 tablet (100 mg total) by mouth in the morning and 1 tablet (100 mg total) before bedtime. 180 tablet 1    terazosin 10 MG Oral Cap Take 1 capsule (10 mg total) by mouth 2 (two) times daily. 180 capsule 1       MEDICAL HISTORY:  Past Medical History:    Cancer (HCC)    paraganglioma    Elevated CK    High blood pressure    Other and unspecified hyperlipidemia    Painful urination    Pre-diabetes    Unspecified essential hypertension     Past Surgical History:   Procedure Laterality Date    Appendectomy      Appendectomy      Other  2020    Exploration retroperitoneum, irreversible electroporation/nanoknife retroperitoneal tumor, intraoperative ultrasound.     Family History   Problem Relation Age of Onset    Hypertension Mother     Diabetes Mother     Diabetes Brother     Cancer Neg     Lipids Neg     Heart Disorder Neg     Stroke Neg      Family Status   Relation Status    Mo     Fa         History unknown    Bro (Not Specified)    NEG  (Not Specified)     Social History     Socioeconomic History    Marital status:     Number of children: 2   Occupational History    Occupation: drives trucks and delivery   Tobacco Use    Smoking status: Some Days    Smokeless tobacco: Never   Vaping Use    Vaping status: Never Used   Substance and Sexual Activity    Alcohol use: Yes     Comment: occ    Drug use: No    Sexual activity: Not Currently     Partners: Female   Other Topics Concern    Caffeine Concern Yes     Comment: 1 cup a day    Exercise Yes     Comment: daily   Social History Narrative    , lives alone    Has 2 sons, lives close by       ROS:  GENERAL:  Denies recorded temperatures greater than 100.5F  RESPIRATORY:  Denies difficulty breathing  CARDIAC:  Denies chest pain with exertion  : Denies urinary issues, flank pain  GI:Denies abdominal pain, bloating  VITALS:   /84 (BP Location: Left arm, Patient Position: Sitting, Cuff Size: large)   Pulse 76   Temp 97.4 °F (36.3 °C)   Wt 216 lb (98 kg)   SpO2 99%   BMI 30.14 kg/m²     Reviewed by MARTIN Bonilla Student     PHYSICAL EXAM:    Constitutional:       Appears well.  Sitting upright on exam table.  Well developed, well nourished, and in no acute distress  HEENT:      Facial features symmetric. Normocephalic and atraumatic     Cardiovascular:      Heart sounds: Regular rate and rhythm without murmur  Pulmonary:      Chest expansion symmetric.  Breathing nonlabored. Lungs clear throughout    Musculoskeletal:         Movements smooth and controlled with appropriate coordination.       Gait intact, steady, nonantalgic.  Skin:     Warm and dry without discoloration.  Psychiatric:         Alert and oriented.  Calm and cooperative.  Speech is clear.     ASSESSMENT & PLAN:    Continue to take medications as prescribed- refills sent to pharmacy.  Monitor BP at home-please send home BP readings via [employer] for review. If readings continue to be elevated consider  additional medication management.    Patient was advised of lab orders placed today as well as medication refills requested. Patient was involved in plan of care and verbalized understanding. Once lab results have been reviewed assessment of current plan and future follow ups will be discussed.    Mariza HENNING RN, an APN student, has documented in this chart.

## 2024-04-18 NOTE — PROGRESS NOTES
Patient evaluated with student nurse practitioner  Independent physical examine performed    /84 (BP Location: Left arm, Patient Position: Sitting, Cuff Size: large)   Pulse 76   Temp 97.4 °F (36.3 °C)   Wt 216 lb (98 kg)   SpO2 99%   BMI 30.14 kg/m²   Physical Exam  Constitutional:       General: He is not in acute distress.     Appearance: Normal appearance.   Cardiovascular:      Rate and Rhythm: Normal rate and regular rhythm.      Heart sounds: Normal heart sounds.   Pulmonary:      Effort: Pulmonary effort is normal.      Breath sounds: Normal breath sounds.   Neurological:      Mental Status: He is alert.       Diagnoses and all orders for this visit:    Uncontrolled hypertension  -     TSH and Free T4 [E]; Future  -     Comp Metabolic Panel (14) [E]; Future  -     amLODIPine 5 MG Oral Tab; Take 1 tablet (5 mg total) by mouth in the morning and 1 tablet (5 mg total) before bedtime.  -     metoprolol succinate  MG Oral Tablet 24 Hr; Take 1 tablet (100 mg total) by mouth in the morning and 1 tablet (100 mg total) before bedtime.  -     terazosin 10 MG Oral Cap; Take 1 capsule (10 mg total) by mouth 2 (two) times daily.    Elevated hemoglobin A1c    Prediabetes    Elevated liver function tests  -     Lipid Panel [E]; Future  -     Comp Metabolic Panel (14) [E]; Future    Pheochromocytoma of right adrenal gland  -     CBC W Differential W Platelet [E]; Future    Dyslipidemia  -     Lipid Panel [E]; Future    Obesity (BMI 30-39.9)      Will return for fasting labs  BP stable, CPM

## 2024-04-18 NOTE — TELEPHONE ENCOUNTER
Pt was free today, scheduled with Destiney Valdez today @ 1:20  Future Appointments   Date Time Provider Department Center   4/18/2024  1:20 PM Destiney Valdez APRN [facility] [place]   4/26/2024  3:30 PM EH TX RN5 [facility]   5/24/2024  3:30 PM EH TX RN5 [facility]   6/21/2024  3:30 PM EH TX RN3 [facility]   7/29/2024  3:30 PM EH TX RN3 [facility]   8/15/2024  3:15 PM Jason Munoz MD [facility]   8/15/2024  3:30 PM EH TX RN3 [facility]

## 2024-04-26 ENCOUNTER — OFFICE VISIT (OUTPATIENT)
Dept: HEMATOLOGY/ONCOLOGY | Facility: HOSPITAL | Age: 54
End: 2024-04-26
Attending: INTERNAL MEDICINE
Payer: COMMERCIAL

## 2024-04-26 VITALS
BODY MASS INDEX: 30.66 KG/M2 | HEART RATE: 72 BPM | TEMPERATURE: 98 F | RESPIRATION RATE: 16 BRPM | HEIGHT: 70.98 IN | SYSTOLIC BLOOD PRESSURE: 163 MMHG | DIASTOLIC BLOOD PRESSURE: 95 MMHG | OXYGEN SATURATION: 100 % | WEIGHT: 219 LBS

## 2024-04-26 DIAGNOSIS — D44.7 PARAGANGLIOMA (HCC): ICD-10-CM

## 2024-04-26 DIAGNOSIS — D35.01 PHEOCHROMOCYTOMA OF RIGHT ADRENAL GLAND: Primary | ICD-10-CM

## 2024-04-26 PROCEDURE — 96372 THER/PROPH/DIAG INJ SC/IM: CPT

## 2024-04-26 PROCEDURE — 36415 COLL VENOUS BLD VENIPUNCTURE: CPT

## 2024-04-26 PROCEDURE — 86316 IMMUNOASSAY TUMOR OTHER: CPT

## 2024-04-26 RX ORDER — LANREOTIDE ACETATE 120 MG/.5ML
120 INJECTION SUBCUTANEOUS ONCE
Status: COMPLETED | OUTPATIENT
Start: 2024-04-26 | End: 2024-04-26

## 2024-04-26 RX ORDER — LANREOTIDE ACETATE 120 MG/.5ML
120 INJECTION SUBCUTANEOUS ONCE
Start: 2024-04-26 | End: 2024-04-26

## 2024-04-26 RX ADMIN — LANREOTIDE ACETATE 120 MG: 120 INJECTION SUBCUTANEOUS at 15:11:00

## 2024-04-26 NOTE — PROGRESS NOTES
Pt arrived amb for lab draw and lanreotide injection (paraganglioma).  Given per MD order.  Pt waldemar well.  Left amb without c/o.  Pt has future appointments scheduled.

## 2024-04-29 LAB — CHROMOGRANIN A: 1325 NG/ML

## 2024-05-21 ENCOUNTER — TELEPHONE (OUTPATIENT)
Dept: FAMILY MEDICINE CLINIC | Facility: CLINIC | Age: 54
End: 2024-05-21

## 2024-05-21 NOTE — TELEPHONE ENCOUNTER
Overdue labs  MCM sent  Future Appointments   Date Time Provider Department Center   5/23/2024  8:30 AM  MR RM3 (3T WIDE) [facility]   5/24/2024  3:30 PM EH TX RN5 [facility]   6/21/2024  3:30 PM EH TX RN3 [facility]   7/29/2024  3:30 PM EH TX RN3 [facility]   8/15/2024  3:15 PM Jason Munoz MD  [facility]   8/15/2024  3:30 PM EH TX RN3 [facility]

## 2024-05-24 ENCOUNTER — TELEPHONE (OUTPATIENT)
Dept: HEMATOLOGY/ONCOLOGY | Facility: HOSPITAL | Age: 54
End: 2024-05-24

## 2024-05-24 ENCOUNTER — APPOINTMENT (OUTPATIENT)
Dept: HEMATOLOGY/ONCOLOGY | Facility: HOSPITAL | Age: 54
End: 2024-05-24
Attending: INTERNAL MEDICINE
Payer: COMMERCIAL

## 2024-05-24 NOTE — TELEPHONE ENCOUNTER
----- Message from Arabella HOLLIDAY sent at 5/24/2024  3:59 PM CDT -----  Regarding: Missed appt.  Please reschedule patient, missed appt today for pl, lanreotide inj.    Thanks! Arabella :)

## 2024-05-31 ENCOUNTER — OFFICE VISIT (OUTPATIENT)
Dept: HEMATOLOGY/ONCOLOGY | Facility: HOSPITAL | Age: 54
End: 2024-05-31
Attending: INTERNAL MEDICINE
Payer: COMMERCIAL

## 2024-05-31 VITALS
BODY MASS INDEX: 30.47 KG/M2 | OXYGEN SATURATION: 98 % | WEIGHT: 217.63 LBS | TEMPERATURE: 97 F | DIASTOLIC BLOOD PRESSURE: 82 MMHG | HEIGHT: 70.98 IN | HEART RATE: 72 BPM | SYSTOLIC BLOOD PRESSURE: 157 MMHG | RESPIRATION RATE: 16 BRPM

## 2024-05-31 DIAGNOSIS — D35.01 PHEOCHROMOCYTOMA OF RIGHT ADRENAL GLAND: Primary | ICD-10-CM

## 2024-05-31 DIAGNOSIS — D44.7 PARAGANGLIOMA (HCC): ICD-10-CM

## 2024-05-31 PROCEDURE — 86316 IMMUNOASSAY TUMOR OTHER: CPT

## 2024-05-31 PROCEDURE — 36415 COLL VENOUS BLD VENIPUNCTURE: CPT

## 2024-05-31 PROCEDURE — 96372 THER/PROPH/DIAG INJ SC/IM: CPT

## 2024-05-31 RX ORDER — LANREOTIDE ACETATE 120 MG/.5ML
120 INJECTION SUBCUTANEOUS ONCE
Status: COMPLETED | OUTPATIENT
Start: 2024-05-31 | End: 2024-05-31

## 2024-05-31 RX ORDER — LANREOTIDE ACETATE 120 MG/.5ML
120 INJECTION SUBCUTANEOUS ONCE
Start: 2024-05-31 | End: 2024-05-31

## 2024-05-31 RX ADMIN — LANREOTIDE ACETATE 120 MG: 120 INJECTION SUBCUTANEOUS at 15:18:00

## 2024-05-31 NOTE — PROGRESS NOTES
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

## 2024-06-05 LAB — CHROMOGRANIN A: 1194 NG/ML

## 2024-06-21 ENCOUNTER — APPOINTMENT (OUTPATIENT)
Dept: HEMATOLOGY/ONCOLOGY | Facility: HOSPITAL | Age: 54
End: 2024-06-21
Attending: INTERNAL MEDICINE
Payer: COMMERCIAL

## 2024-06-21 ENCOUNTER — TELEPHONE (OUTPATIENT)
Dept: FAMILY MEDICINE CLINIC | Facility: CLINIC | Age: 54
End: 2024-06-21

## 2024-06-21 NOTE — TELEPHONE ENCOUNTER
Labs due  Letter sent  Future Appointments   Date Time Provider Department Center   6/28/2024  3:30 PM EH TX RN4 [facility]   7/29/2024  3:30 PM EH TX RN3  [facility]   8/15/2024  3:15 PM Jason Munoz MD  [facility]   8/15/2024  3:30 PM EH TX RN3  [facility]

## 2024-06-28 ENCOUNTER — OFFICE VISIT (OUTPATIENT)
Dept: HEMATOLOGY/ONCOLOGY | Facility: HOSPITAL | Age: 54
End: 2024-06-28
Attending: INTERNAL MEDICINE
Payer: COMMERCIAL

## 2024-06-28 VITALS
HEART RATE: 96 BPM | HEIGHT: 70.98 IN | RESPIRATION RATE: 16 BRPM | DIASTOLIC BLOOD PRESSURE: 82 MMHG | TEMPERATURE: 98 F | OXYGEN SATURATION: 96 % | SYSTOLIC BLOOD PRESSURE: 154 MMHG | BODY MASS INDEX: 30.33 KG/M2 | WEIGHT: 216.63 LBS

## 2024-06-28 DIAGNOSIS — D35.01 PHEOCHROMOCYTOMA OF RIGHT ADRENAL GLAND: Primary | ICD-10-CM

## 2024-06-28 DIAGNOSIS — D44.7 PARAGANGLIOMA (HCC): ICD-10-CM

## 2024-06-28 PROCEDURE — 86316 IMMUNOASSAY TUMOR OTHER: CPT

## 2024-06-28 PROCEDURE — 96372 THER/PROPH/DIAG INJ SC/IM: CPT

## 2024-06-28 PROCEDURE — 36415 COLL VENOUS BLD VENIPUNCTURE: CPT

## 2024-06-28 RX ORDER — LANREOTIDE ACETATE 120 MG/.5ML
120 INJECTION SUBCUTANEOUS ONCE
Start: 2024-06-28 | End: 2024-06-28

## 2024-06-28 RX ORDER — LANREOTIDE ACETATE 120 MG/.5ML
120 INJECTION SUBCUTANEOUS ONCE
Status: COMPLETED | OUTPATIENT
Start: 2024-06-28 | End: 2024-06-28

## 2024-06-28 RX ADMIN — LANREOTIDE ACETATE 120 MG: 120 INJECTION SUBCUTANEOUS at 15:05:00

## 2024-06-28 NOTE — PROGRESS NOTES
Education Record    Learner:  Patient    Disease / Diagnosis: Pheochromocytoma    Barriers / Limitations:  None   Comments:    Method:  Brief focused and Reinforcement   Comments:    General Topics:  Plan of care reviewed   Comments:    Outcome:  Shows understanding   Comments:    Patient tolerated Lanreotide injection and discharged in stable condition.

## 2024-07-01 LAB — CHROMOGRANIN A: 1161 NG/ML

## 2024-07-29 ENCOUNTER — OFFICE VISIT (OUTPATIENT)
Dept: HEMATOLOGY/ONCOLOGY | Facility: HOSPITAL | Age: 54
End: 2024-07-29
Attending: INTERNAL MEDICINE
Payer: COMMERCIAL

## 2024-07-29 VITALS
TEMPERATURE: 98 F | BODY MASS INDEX: 30.94 KG/M2 | DIASTOLIC BLOOD PRESSURE: 84 MMHG | WEIGHT: 221 LBS | HEART RATE: 80 BPM | OXYGEN SATURATION: 100 % | RESPIRATION RATE: 16 BRPM | SYSTOLIC BLOOD PRESSURE: 138 MMHG | HEIGHT: 70.98 IN

## 2024-07-29 DIAGNOSIS — D44.7 PARAGANGLIOMA (HCC): Primary | ICD-10-CM

## 2024-07-29 DIAGNOSIS — D35.01 PHEOCHROMOCYTOMA OF RIGHT ADRENAL GLAND: ICD-10-CM

## 2024-07-29 PROCEDURE — 36415 COLL VENOUS BLD VENIPUNCTURE: CPT

## 2024-07-29 PROCEDURE — 86316 IMMUNOASSAY TUMOR OTHER: CPT

## 2024-07-29 PROCEDURE — 96372 THER/PROPH/DIAG INJ SC/IM: CPT

## 2024-07-29 RX ORDER — LANREOTIDE ACETATE 120 MG/.5ML
120 INJECTION SUBCUTANEOUS ONCE
Status: COMPLETED | OUTPATIENT
Start: 2024-07-29 | End: 2024-07-29

## 2024-07-29 RX ORDER — LANREOTIDE ACETATE 120 MG/.5ML
120 INJECTION SUBCUTANEOUS ONCE
Start: 2024-07-29 | End: 2024-07-29

## 2024-07-29 RX ADMIN — LANREOTIDE ACETATE 120 MG: 120 INJECTION SUBCUTANEOUS at 15:05:00

## 2024-07-29 NOTE — PROGRESS NOTES
Education Record    Learner:  Patient    Pt here for: Lanreotide injection.    Barriers / Limitations:  None    Method:  Brief focused, printed material and  reinforcement    General Topics:  Plan of care reviewed    Outcome: Pt tolerated injection with no c/o. Appt requested for 4 weeks with MD and injection.

## 2024-07-31 LAB — CHROMOGRANIN A: 1237 NG/ML

## 2024-08-15 ENCOUNTER — APPOINTMENT (OUTPATIENT)
Dept: HEMATOLOGY/ONCOLOGY | Facility: HOSPITAL | Age: 54
End: 2024-08-15
Attending: INTERNAL MEDICINE
Payer: COMMERCIAL

## 2024-08-26 ENCOUNTER — OFFICE VISIT (OUTPATIENT)
Dept: HEMATOLOGY/ONCOLOGY | Facility: HOSPITAL | Age: 54
End: 2024-08-26
Attending: INTERNAL MEDICINE
Payer: COMMERCIAL

## 2024-08-26 VITALS
DIASTOLIC BLOOD PRESSURE: 75 MMHG | SYSTOLIC BLOOD PRESSURE: 162 MMHG | HEART RATE: 78 BPM | RESPIRATION RATE: 16 BRPM | OXYGEN SATURATION: 98 % | BODY MASS INDEX: 31.36 KG/M2 | WEIGHT: 224 LBS | TEMPERATURE: 98 F | HEIGHT: 70.98 IN

## 2024-08-26 DIAGNOSIS — D35.01 PHEOCHROMOCYTOMA OF RIGHT ADRENAL GLAND: Primary | ICD-10-CM

## 2024-08-26 DIAGNOSIS — D44.7 PARAGANGLIOMA (HCC): Primary | ICD-10-CM

## 2024-08-26 DIAGNOSIS — D44.7 PARAGANGLIOMA (HCC): ICD-10-CM

## 2024-08-26 PROCEDURE — 99215 OFFICE O/P EST HI 40 MIN: CPT | Performed by: INTERNAL MEDICINE

## 2024-08-26 PROCEDURE — G2211 COMPLEX E/M VISIT ADD ON: HCPCS | Performed by: INTERNAL MEDICINE

## 2024-08-26 PROCEDURE — 96372 THER/PROPH/DIAG INJ SC/IM: CPT

## 2024-08-26 RX ORDER — LISINOPRIL 5 MG/1
5 TABLET ORAL DAILY
COMMUNITY

## 2024-08-26 RX ORDER — TERAZOSIN 10 MG/1
10 CAPSULE ORAL 2 TIMES DAILY
COMMUNITY
Start: 2024-07-23

## 2024-08-26 RX ORDER — LANREOTIDE ACETATE 120 MG/.5ML
120 INJECTION SUBCUTANEOUS ONCE
Status: COMPLETED | OUTPATIENT
Start: 2024-08-26 | End: 2024-08-26

## 2024-08-26 RX ORDER — LANREOTIDE ACETATE 120 MG/.5ML
120 INJECTION SUBCUTANEOUS ONCE
Start: 2024-08-26 | End: 2024-08-26

## 2024-08-26 RX ADMIN — LANREOTIDE ACETATE 120 MG: 120 INJECTION SUBCUTANEOUS at 15:00:00

## 2024-08-26 NOTE — PROGRESS NOTES
Hematology/Oncology Clinic Follow Up Visit    Patient Name: Jazlyn Hays  Medical Record Number: [id number]    YOB: 1970   PCP: Isabella Hunter MD    Reason for Consultation:  Jazlyn Hays was seen today for the diagnosis of unresectable pheochromocytoma    Oncologic History:  9/2020: s/p biopsy of R adrenal gland mass with path consistent with pheochromocytoma. Attempted resection but unresectable due to overlying vascular structures  10/2020: Dotatate PET with increased uptake only at retroperitoneal mass consistent with neuroendocrine tumor.   12/2020: underwent exploration of retroperitoneum with irreversible electroporation of tumor. Started on lanreotide. Was also recommended RT but he declined.    History of Present Illness:      55 y/o M PMH unresectable pheochromocytoma on lanreotide who presents for follow up.    - denies any issues, no headaches or palpitations  - on amlodipine, terazosin and metoprolol or blood pressure; working with PCP for anti-hypertensive control. He notes he is running of his meds. I told him to return to see his PCP.  - has not gotten his MRIs for R elbow and phreochromocytoma. I told him to get these MRIs. He still has intermittent R elbow pain with flexion    Past Medical History:  Past Medical History:    Cancer (HCC)    paraganglioma    Elevated CK    High blood pressure    Other and unspecified hyperlipidemia    Painful urination    Pre-diabetes    Unspecified essential hypertension     Past Surgical History:   Procedure Laterality Date    Appendectomy      Appendectomy      Other  12/11/2020    Exploration retroperitoneum, irreversible electroporation/nanoknife retroperitoneal tumor, intraoperative ultrasound.       Home Medications:   terazosin 10 MG Oral Cap Take 1 capsule (10 mg total) by mouth 2 (two) times daily.      lisinopril 5 MG Oral Tab Take 1 tablet (5 mg total) by mouth daily.      metoprolol succinate  MG Oral Tablet 24 Hr Take 1  tablet (100 mg total) by mouth in the morning and 1 tablet (100 mg total) before bedtime. 180 tablet 1       Allergies:   No Known Allergies    Psychosocial History:  Social History     Socioeconomic History    Marital status:      Spouse name: Not on file    Number of children: 2    Years of education: Not on file    Highest education level: Not on file   Occupational History    Occupation: drives trucks and delivery   Tobacco Use    Smoking status: Some Days     Types: Cigars    Smokeless tobacco: Never   Vaping Use    Vaping status: Never Used   Substance and Sexual Activity    Alcohol use: Yes     Comment: occ    Drug use: No    Sexual activity: Not Currently     Partners: Female   Other Topics Concern     Service Not Asked    Blood Transfusions Not Asked    Caffeine Concern Yes     Comment: 1 cup a day    Occupational Exposure Not Asked    Hobby Hazards Not Asked    Sleep Concern Not Asked    Stress Concern Not Asked    Weight Concern Not Asked    Special Diet Not Asked    Back Care Not Asked    Exercise Yes     Comment: daily    Bike Helmet Not Asked    Seat Belt Not Asked    Self-Exams Not Asked   Social History Narrative    , lives alone    Has 2 sons, lives close by     Social Determinants of Health     Financial Resource Strain: Not on file   Food Insecurity: Not on file   Transportation Needs: Not on file   Physical Activity: Not on file   Stress: Not on file   Social Connections: Not on file   Housing Stability: Not on file       Family Medical History:  Family History   Problem Relation Age of Onset    Hypertension Mother     Diabetes Mother     Diabetes Brother     Cancer Neg     Lipids Neg     Heart Disorder Neg     Stroke Neg        Review of Systems:  A 10-point ROS was done with pertinent positives and negative per the HPI    Vital Signs:  Height: 180.3 cm (5' 10.98\") (08/26 1427)  Weight: 101.6 kg (224 lb) (08/26 1427)  BSA (Calculated - sq m): 2.21 sq meters (08/26  1427)  Pulse: 78 (08/26 1427)  BP: 162/75 (08/26 1427)  Temp: 97.5 °F (36.4 °C) (08/26 1427)  Do Not Use - Resp Rate: --  SpO2: 98 % (08/26 1427)    Wt Readings from Last 6 Encounters:   08/26/24 101.6 kg (224 lb)   07/29/24 100.2 kg (221 lb)   06/28/24 98.2 kg (216 lb 9.6 oz)   05/31/24 98.7 kg (217 lb 9.6 oz)   04/26/24 99.3 kg (219 lb)   04/18/24 98 kg (216 lb)       ECOG PS: 1    Physical Examination:  General: Patient is alert and oriented, not in acute distress  Psych: Mood and affect are appropriate  Eyes: EOMI, PERRL  ENT: Oropharynx is clear, no adenopathy  CV: no LE edema  Respiratory: Non-labored respirations  Neurological: Grossly intact   MSK: Unable to flex elbow further beyond a certain point.   Skin: no rashes or petechiae    Laboratory:  Lab Results   Component Value Date    WBC 7.8 08/07/2023    WBC 8.0 06/23/2023    WBC 10.3 03/31/2023    HGB 13.3 08/07/2023    HGB 14.1 06/23/2023    HGB 13.5 03/31/2023    HCT 39.9 08/07/2023    MCV 88.3 08/07/2023    MCH 29.4 08/07/2023    MCHC 33.3 08/07/2023    RDW 12.2 08/07/2023    .0 08/07/2023    .0 06/23/2023    .0 03/31/2023     Lab Results   Component Value Date     (H) 08/07/2023    BUN 17 08/07/2023    BUNCREA 10.1 07/09/2021    CREATSERUM 1.37 (H) 08/07/2023    CREATSERUM 1.32 (H) 06/23/2023    CREATSERUM 1.45 (H) 03/31/2023    ANIONGAP 7 08/07/2023    GFR 70 06/21/2017    GFRNAA 65 10/07/2021    GFRAA 75 10/07/2021    CA 9.5 08/07/2023    OSMOCALC 294 08/07/2023    ALKPHO 94 08/07/2023    AST 43 (H) 08/07/2023    ALT 65 (H) 08/07/2023    BILT 0.4 08/07/2023    TP 7.7 08/07/2023    ALB 4.0 08/07/2023    GLOBULIN 3.7 08/07/2023    AGRATIO 1.5 09/14/2019     08/07/2023    K 4.0 08/07/2023     08/07/2023    CO2 24.0 08/07/2023     No results found for: \"PTT\", \"PT\", \"INR\"      Impression & Plan:     Unresectable pheochromocytoma  - Unresectable due to overlying vascular structures, and has received irreversible  electroporation  - has not received RT to this mass; he declined  - restaging MRI abdomen has been stable; continue monthly lanreotide  - discussed with pt to repeat MRI abdomen to ensure disease stability  - if there is growth, pt will need RT for disease control; previously discussed    Secondary hypertension  - on terazosin, metoprolol and amlodipine, not at goal today. Return to see PCP    R elbow pain  - obtain elbow MRI. May need orthopedics referral    Follow up: 6 months    Jason Munoz  Hematology/Medical Oncology  [facility]

## 2024-08-26 NOTE — PROGRESS NOTES
Pt arrived amb for lanreotide inj.  Given per MD order.  Pt waldemar well. Left amb without c/o.    Education Record    Learner:  Patient    Pt here for: lanreotide inj for paraganglioma    Barriers / Limitations:  None    Method:  discussion    General Topics:  Plan of care reviewed    Outcome: Pt tolerated infusion with no c/o.

## 2024-08-29 LAB — CHROMOGRANIN A: 1259 NG/ML

## 2024-09-23 ENCOUNTER — OFFICE VISIT (OUTPATIENT)
Dept: HEMATOLOGY/ONCOLOGY | Facility: HOSPITAL | Age: 54
End: 2024-09-23
Attending: INTERNAL MEDICINE
Payer: COMMERCIAL

## 2024-09-23 VITALS
HEART RATE: 69 BPM | HEIGHT: 70.98 IN | TEMPERATURE: 97 F | RESPIRATION RATE: 16 BRPM | SYSTOLIC BLOOD PRESSURE: 137 MMHG | OXYGEN SATURATION: 99 % | BODY MASS INDEX: 30.99 KG/M2 | WEIGHT: 221.38 LBS | DIASTOLIC BLOOD PRESSURE: 85 MMHG

## 2024-09-23 DIAGNOSIS — D44.7 PARAGANGLIOMA (HCC): Primary | ICD-10-CM

## 2024-09-23 DIAGNOSIS — D35.01 PHEOCHROMOCYTOMA OF RIGHT ADRENAL GLAND: ICD-10-CM

## 2024-09-23 PROCEDURE — 86316 IMMUNOASSAY TUMOR OTHER: CPT

## 2024-09-23 PROCEDURE — 96372 THER/PROPH/DIAG INJ SC/IM: CPT

## 2024-09-23 RX ORDER — LANREOTIDE ACETATE 120 MG/.5ML
120 INJECTION SUBCUTANEOUS ONCE
Start: 2024-09-23 | End: 2024-09-23

## 2024-09-23 RX ORDER — LANREOTIDE ACETATE 120 MG/.5ML
120 INJECTION SUBCUTANEOUS ONCE
Status: COMPLETED | OUTPATIENT
Start: 2024-09-23 | End: 2024-09-23

## 2024-09-23 RX ADMIN — LANREOTIDE ACETATE 120 MG: 120 INJECTION SUBCUTANEOUS at 15:06:00

## 2024-09-23 NOTE — PROGRESS NOTES
Pt arrived amb for lanreotide inj.  Given per MD order.  Pt waldemar well, left amb with future appointments scheduled.    Education Record    Learner:  Patient    Pt here for: lanreotide inj for paraganglioma    Barriers / Limitations:  None    Method:  discussion    General Topics:  Plan of care reviewed    Outcome: Pt tolerated infusion with no c/o.

## 2024-09-25 LAB — CHROMOGRANIN A: 1315 NG/ML

## 2024-10-04 ENCOUNTER — HOSPITAL ENCOUNTER (OUTPATIENT)
Dept: MRI IMAGING | Facility: HOSPITAL | Age: 54
Discharge: HOME | End: 2024-10-04
Attending: INTERNAL MEDICINE
Payer: COMMERCIAL

## 2024-10-04 PROCEDURE — 73223 MRI JOINT UPR EXTR W/O&W/DYE: CPT | Performed by: INTERNAL MEDICINE

## 2024-10-04 PROCEDURE — A9575 INJ GADOTERATE MEGLUMI 0.1ML: HCPCS | Performed by: INTERNAL MEDICINE

## 2024-10-04 PROCEDURE — 74183 MRI ABD W/O CNTR FLWD CNTR: CPT | Performed by: INTERNAL MEDICINE

## 2024-10-04 RX ORDER — DIPHENHYDRAMINE HYDROCHLORIDE 50 MG/ML
10 INJECTION, SOLUTION INTRAMUSCULAR; INTRAVENOUS
Status: COMPLETED | OUTPATIENT
Start: 2024-10-04 | End: 2024-10-04

## 2024-10-04 RX ADMIN — DIPHENHYDRAMINE HYDROCHLORIDE 20 ML: 50 INJECTION, SOLUTION INTRAMUSCULAR; INTRAVENOUS at 20:43:00

## 2024-10-21 ENCOUNTER — OFFICE VISIT (OUTPATIENT)
Dept: HEMATOLOGY/ONCOLOGY | Facility: HOSPITAL | Age: 54
End: 2024-10-21
Attending: INTERNAL MEDICINE
Payer: COMMERCIAL

## 2024-10-21 VITALS
DIASTOLIC BLOOD PRESSURE: 90 MMHG | OXYGEN SATURATION: 98 % | HEART RATE: 81 BPM | SYSTOLIC BLOOD PRESSURE: 153 MMHG | TEMPERATURE: 98 F | RESPIRATION RATE: 16 BRPM

## 2024-10-21 DIAGNOSIS — D44.7 PARAGANGLIOMA (HCC): Primary | ICD-10-CM

## 2024-10-21 DIAGNOSIS — D35.01 PHEOCHROMOCYTOMA OF RIGHT ADRENAL GLAND: ICD-10-CM

## 2024-10-21 PROCEDURE — 96372 THER/PROPH/DIAG INJ SC/IM: CPT

## 2024-10-21 PROCEDURE — 86316 IMMUNOASSAY TUMOR OTHER: CPT

## 2024-10-21 RX ORDER — LANREOTIDE ACETATE 120 MG/.5ML
120 INJECTION SUBCUTANEOUS ONCE
Start: 2024-10-21 | End: 2024-10-21

## 2024-10-21 RX ORDER — LANREOTIDE ACETATE 120 MG/.5ML
120 INJECTION SUBCUTANEOUS ONCE
Status: COMPLETED | OUTPATIENT
Start: 2024-10-21 | End: 2024-10-21

## 2024-10-21 RX ADMIN — LANREOTIDE ACETATE 120 MG: 120 INJECTION SUBCUTANEOUS at 14:44:00

## 2024-10-21 NOTE — PROGRESS NOTES
Education Record    Learner:  Patient    Disease / Diagnosis: Paraganglioma    Barriers / Limitations:  None   Comments:    Method:  Brief focused and Reinforcement   Comments:    General Topics:  Plan of care reviewed   Comments:    Outcome:  Shows understanding   Comments:    Patient tolerated Lanreotide and discharged in stable condition.

## 2024-10-23 LAB — CHROMOGRANIN A: 1129 NG/ML

## 2024-11-18 ENCOUNTER — OFFICE VISIT (OUTPATIENT)
Dept: HEMATOLOGY/ONCOLOGY | Facility: HOSPITAL | Age: 54
End: 2024-11-18
Attending: INTERNAL MEDICINE
Payer: COMMERCIAL

## 2024-11-18 DIAGNOSIS — D44.7 PARAGANGLIOMA (HCC): ICD-10-CM

## 2024-11-18 DIAGNOSIS — D35.01 PHEOCHROMOCYTOMA OF RIGHT ADRENAL GLAND: Primary | ICD-10-CM

## 2024-11-18 PROCEDURE — 86316 IMMUNOASSAY TUMOR OTHER: CPT

## 2024-11-18 PROCEDURE — 96372 THER/PROPH/DIAG INJ SC/IM: CPT

## 2024-11-18 RX ORDER — LANREOTIDE ACETATE 120 MG/.5ML
120 INJECTION SUBCUTANEOUS ONCE
Status: COMPLETED | OUTPATIENT
Start: 2024-11-18 | End: 2024-11-18

## 2024-11-18 RX ORDER — LANREOTIDE ACETATE 120 MG/.5ML
120 INJECTION SUBCUTANEOUS ONCE
Start: 2024-11-18 | End: 2024-11-18

## 2024-11-18 RX ADMIN — LANREOTIDE ACETATE 120 MG: 120 INJECTION SUBCUTANEOUS at 15:19:00

## 2024-11-18 NOTE — PROGRESS NOTES
Pt arrived amb.  Lab drawn per MD order.  Lanreotide inj given per MD order.  Pt waldemar well.  Left amb with future appointments scheduled.    Education Record    Learner:  Patient    Pt here for: Lanreotide inj for paraganglioma    Barriers / Limitations:  None    Method:  discussion    General Topics:  Plan of care reviewed    Outcome: Pt tolerated injection with no c/o.

## 2024-11-19 LAB — CHROMOGRANIN A: 1232 NG/ML

## 2024-12-02 ENCOUNTER — TELEPHONE (OUTPATIENT)
Dept: HEMATOLOGY/ONCOLOGY | Facility: HOSPITAL | Age: 54
End: 2024-12-02

## 2024-12-02 DIAGNOSIS — I10 UNCONTROLLED HYPERTENSION: ICD-10-CM

## 2024-12-02 RX ORDER — AMLODIPINE BESYLATE 5 MG/1
5 TABLET ORAL 2 TIMES DAILY
Qty: 60 TABLET | Refills: 3 | Status: SHIPPED | OUTPATIENT
Start: 2024-12-02 | End: 2025-03-02

## 2024-12-16 ENCOUNTER — OFFICE VISIT (OUTPATIENT)
Age: 54
End: 2024-12-16
Attending: INTERNAL MEDICINE
Payer: COMMERCIAL

## 2024-12-16 VITALS
DIASTOLIC BLOOD PRESSURE: 68 MMHG | BODY MASS INDEX: 30.66 KG/M2 | SYSTOLIC BLOOD PRESSURE: 125 MMHG | OXYGEN SATURATION: 97 % | TEMPERATURE: 97 F | HEIGHT: 70.98 IN | RESPIRATION RATE: 16 BRPM | HEART RATE: 118 BPM | WEIGHT: 219 LBS

## 2024-12-16 DIAGNOSIS — D44.7 PARAGANGLIOMA (HCC): Primary | ICD-10-CM

## 2024-12-16 DIAGNOSIS — D35.01 PHEOCHROMOCYTOMA OF RIGHT ADRENAL GLAND: ICD-10-CM

## 2024-12-16 RX ORDER — LANREOTIDE ACETATE 120 MG/.5ML
120 INJECTION SUBCUTANEOUS ONCE
Start: 2024-12-16 | End: 2024-12-16

## 2024-12-16 RX ORDER — LANREOTIDE ACETATE 120 MG/.5ML
120 INJECTION SUBCUTANEOUS ONCE
Status: COMPLETED | OUTPATIENT
Start: 2024-12-16 | End: 2024-12-16

## 2024-12-16 RX ADMIN — LANREOTIDE ACETATE 120 MG: 120 INJECTION SUBCUTANEOUS at 15:08:00

## 2024-12-16 NOTE — PROGRESS NOTES
Education Record    Learner:  Patient    Disease / Diagnosis: Paraganglioma    Barriers / Limitations:  None   Comments:    Method:  Discussion   Comments:    General Topics:  Medication, Side effects and symptom management, Plan of care reviewed, and Fall risk and prevention   Comments:    Outcome:  Shows understanding   Comments:    Lanreotide injection administered per order. Tolerated well. Pt discharged ambulatory in stable condition.

## 2024-12-18 LAB — CHROMOGRANIN A: 1252 NG/ML

## 2025-01-13 ENCOUNTER — OFFICE VISIT (OUTPATIENT)
Age: 55
End: 2025-01-13
Attending: INTERNAL MEDICINE
Payer: COMMERCIAL

## 2025-01-13 VITALS
DIASTOLIC BLOOD PRESSURE: 89 MMHG | SYSTOLIC BLOOD PRESSURE: 148 MMHG | RESPIRATION RATE: 16 BRPM | HEART RATE: 92 BPM | OXYGEN SATURATION: 98 %

## 2025-01-13 DIAGNOSIS — D44.7 PARAGANGLIOMA (HCC): ICD-10-CM

## 2025-01-13 DIAGNOSIS — D35.01 PHEOCHROMOCYTOMA OF RIGHT ADRENAL GLAND: Primary | ICD-10-CM

## 2025-01-13 RX ORDER — LANREOTIDE ACETATE 120 MG/.5ML
120 INJECTION SUBCUTANEOUS ONCE
Start: 2025-01-13 | End: 2025-01-13

## 2025-01-13 RX ORDER — LANREOTIDE ACETATE 120 MG/.5ML
120 INJECTION SUBCUTANEOUS ONCE
Status: COMPLETED | OUTPATIENT
Start: 2025-01-13 | End: 2025-01-13

## 2025-01-13 RX ADMIN — LANREOTIDE ACETATE 120 MG: 120 INJECTION SUBCUTANEOUS at 13:23:00

## 2025-01-15 LAB — CHROMOGRANIN A: 1068 NG/ML

## 2025-02-10 ENCOUNTER — OFFICE VISIT (OUTPATIENT)
Age: 55
End: 2025-02-10
Attending: INTERNAL MEDICINE
Payer: COMMERCIAL

## 2025-02-10 VITALS
RESPIRATION RATE: 16 BRPM | WEIGHT: 222.5 LBS | DIASTOLIC BLOOD PRESSURE: 72 MMHG | BODY MASS INDEX: 31.15 KG/M2 | TEMPERATURE: 98 F | OXYGEN SATURATION: 98 % | HEIGHT: 70.98 IN | HEART RATE: 103 BPM | SYSTOLIC BLOOD PRESSURE: 122 MMHG

## 2025-02-10 DIAGNOSIS — D44.7 PARAGANGLIOMA (HCC): ICD-10-CM

## 2025-02-10 DIAGNOSIS — D35.01 PHEOCHROMOCYTOMA OF RIGHT ADRENAL GLAND: Primary | ICD-10-CM

## 2025-02-10 DIAGNOSIS — F41.9 ANXIETY: ICD-10-CM

## 2025-02-10 RX ORDER — ESCITALOPRAM OXALATE 10 MG/1
10 TABLET ORAL DAILY
Qty: 30 TABLET | Refills: 3 | Status: SHIPPED | OUTPATIENT
Start: 2025-02-10

## 2025-02-10 RX ORDER — LANREOTIDE ACETATE 120 MG/.5ML
120 INJECTION SUBCUTANEOUS ONCE
Start: 2025-02-10 | End: 2025-02-10

## 2025-02-10 RX ORDER — LANREOTIDE ACETATE 120 MG/.5ML
120 INJECTION SUBCUTANEOUS ONCE
Status: COMPLETED | OUTPATIENT
Start: 2025-02-10 | End: 2025-02-10

## 2025-02-10 RX ADMIN — LANREOTIDE ACETATE 120 MG: 120 INJECTION SUBCUTANEOUS at 15:07:00

## 2025-02-10 NOTE — PROGRESS NOTES
Hematology/Oncology Clinic Follow Up Visit    Patient Name: Jazlyn Hays  Medical Record Number: [id number]    YOB: 1970   PCP: Isabella Hunter MD    Reason for Consultation:  Jazlyn Hays was seen today for the diagnosis of unresectable pheochromocytoma    Oncologic History:  9/2020: s/p biopsy of R adrenal gland mass with path consistent with pheochromocytoma. Attempted resection but unresectable due to overlying vascular structures  10/2020: Dotatate PET with increased uptake only at retroperitoneal mass consistent with neuroendocrine tumor.   12/2020: underwent exploration of retroperitoneum with irreversible electroporation of tumor. Started on lanreotide. Was also recommended RT but he declined.    History of Present Illness:      53 y/o M PMH unresectable pheochromocytoma on lanreotide who presents for follow up.    - he notes that he experiences anxiety and palpitations at night for the last 3 months  - denies increased blood pressure - taking his meds, and his BP today is excellent  - R elbow is getting better, he has been drinking 'mushroom coffee' for it. He did not see ortho  - denies any abdominal pain or headaches    Past Medical History:  Past Medical History:    Cancer (HCC)    paraganglioma    Elevated CK    High blood pressure    Other and unspecified hyperlipidemia    Painful urination    Pre-diabetes    Unspecified essential hypertension     Past Surgical History:   Procedure Laterality Date    Appendectomy      Appendectomy      Other  12/11/2020    Exploration retroperitoneum, irreversible electroporation/nanoknife retroperitoneal tumor, intraoperative ultrasound.       Home Medications:  No outpatient medications have been marked as taking for the 2/10/25 encounter (Office Visit) with Jason Munoz MD.       Allergies:   No Known Allergies    Psychosocial History:  Social History     Socioeconomic History    Marital status:      Spouse name: Not on file     Number of children: 2    Years of education: Not on file    Highest education level: Not on file   Occupational History    Occupation: drives trucks and delivery   Tobacco Use    Smoking status: Some Days     Types: Cigars    Smokeless tobacco: Never   Vaping Use    Vaping status: Never Used   Substance and Sexual Activity    Alcohol use: Yes     Comment: occ    Drug use: No    Sexual activity: Not Currently     Partners: Female   Other Topics Concern     Service Not Asked    Blood Transfusions Not Asked    Caffeine Concern Yes     Comment: 1 cup a day    Occupational Exposure Not Asked    Hobby Hazards Not Asked    Sleep Concern Not Asked    Stress Concern Not Asked    Weight Concern Not Asked    Special Diet Not Asked    Back Care Not Asked    Exercise Yes     Comment: daily    Bike Helmet Not Asked    Seat Belt Not Asked    Self-Exams Not Asked   Social History Narrative    , lives alone    Has 2 sons, lives close by     Social Drivers of Health     Food Insecurity: Not on file   Transportation Needs: Not on file   Housing Stability: Not on file       Family Medical History:  Family History   Problem Relation Age of Onset    Hypertension Mother     Diabetes Mother     Diabetes Brother     Cancer Neg     Lipids Neg     Heart Disorder Neg     Stroke Neg        Review of Systems:  A 10-point ROS was done with pertinent positives and negative per the HPI    Vital Signs:  Height: 180.3 cm (5' 10.98\") (02/10 1431)  Weight: 100.9 kg (222 lb 8 oz) (02/10 1431)  BSA (Calculated - sq m): 2.21 sq meters (02/10 1431)  Pulse: 103 (02/10 1431)  BP: 122/72 (02/10 1431)  Temp: 98.2 °F (36.8 °C) (02/10 1431)  Do Not Use - Resp Rate: --  SpO2: 98 % (02/10 1431)    Wt Readings from Last 6 Encounters:   02/10/25 100.9 kg (222 lb 8 oz)   12/16/24 99.3 kg (219 lb)   09/23/24 100.4 kg (221 lb 6.4 oz)   08/26/24 101.6 kg (224 lb)   07/29/24 100.2 kg (221 lb)   06/28/24 98.2 kg (216 lb 9.6 oz)       ECOG PS: 1    Physical  Examination:  General: Patient is alert and oriented, not in acute distress  Psych: Mood and affect are appropriate  Eyes: EOMI, PERRL  ENT: Oropharynx is clear, no adenopathy  CV: no LE edema  Respiratory: Non-labored respirations  Neurological: Grossly intact   MSK: Unable to flex elbow further beyond a certain point.   Skin: no rashes or petechiae    Laboratory:  Lab Results   Component Value Date    WBC 7.8 08/07/2023    WBC 8.0 06/23/2023    WBC 10.3 03/31/2023    HGB 13.3 08/07/2023    HGB 14.1 06/23/2023    HGB 13.5 03/31/2023    HCT 39.9 08/07/2023    MCV 88.3 08/07/2023    MCH 29.4 08/07/2023    MCHC 33.3 08/07/2023    RDW 12.2 08/07/2023    .0 08/07/2023    .0 06/23/2023    .0 03/31/2023     Lab Results   Component Value Date     (H) 08/07/2023    BUN 17 08/07/2023    BUNCREA 10.1 07/09/2021    CREATSERUM 1.37 (H) 08/07/2023    CREATSERUM 1.32 (H) 06/23/2023    CREATSERUM 1.45 (H) 03/31/2023    ANIONGAP 7 08/07/2023    GFR 70 06/21/2017    GFRNAA 65 10/07/2021    GFRAA 75 10/07/2021    CA 9.5 08/07/2023    OSMOCALC 294 08/07/2023    ALKPHO 94 08/07/2023    AST 43 (H) 08/07/2023    ALT 65 (H) 08/07/2023    BILT 0.4 08/07/2023    TP 7.7 08/07/2023    ALB 4.0 08/07/2023    GLOBULIN 3.7 08/07/2023    AGRATIO 1.5 09/14/2019     08/07/2023    K 4.0 08/07/2023     08/07/2023    CO2 24.0 08/07/2023     No results found for: \"PTT\", \"PT\", \"INR\"      Impression & Plan:     Unresectable pheochromocytoma  - Unresectable due to overlying vascular structures, and has received irreversible electroporation  - has not received RT to this mass; he declined  - restaging MRI abdomen has been stable; continue monthly lanreotide  - if there is growth, pt will need RT for disease control; previously discussed  - repeat MRI abdomen in 4/2025    Secondary hypertension  - on terazosin, metoprolol and amlodipine. BP excellent today. Continue f/u with PCP    Anxiety  - likely having anxiety.  Unlikely from growing phreo given his BP is completely normal, otherwise would expect catecholamine surge and correspondingly increased BP.  - start lexapro 10mg daily. Watch for risk of SI/HI  - will f/u in 2 months after scans to assess lexapro effect    Follow up: 2 months    Jason Munoz MD  [facility] Hematology Oncology

## 2025-02-12 LAB — CHROMOGRANIN A: 1319 NG/ML

## 2025-03-01 DIAGNOSIS — D35.01 PHEOCHROMOCYTOMA OF RIGHT ADRENAL GLAND: ICD-10-CM

## 2025-03-03 RX ORDER — TERAZOSIN 10 MG/1
10 CAPSULE ORAL 2 TIMES DAILY
Qty: 60 CAPSULE | Refills: 0 | Status: SHIPPED | OUTPATIENT
Start: 2025-03-03 | End: 2025-03-17

## 2025-03-03 NOTE — TELEPHONE ENCOUNTER
Future Appointments   Date Time Provider Department Center   3/10/2025  3:30 PM NP TX RN3 NP  [facility]   3/17/2025  4:00 PM Negrita Nelson MD [facility] [place]   4/7/2025  3:30 PM aJson Munoz MD NP  [facility]   4/7/2025  3:45 PM NP TX RN2 NP  [facility]

## 2025-03-03 NOTE — TELEPHONE ENCOUNTER
Last Office Visit: 4/18/2024  Last Refill: 4/18/24  Last Labs: 7/2023    Patient due for physical and updated labs. Please call  1 month supply sent

## 2025-03-10 ENCOUNTER — OFFICE VISIT (OUTPATIENT)
Age: 55
End: 2025-03-10
Attending: INTERNAL MEDICINE
Payer: COMMERCIAL

## 2025-03-10 VITALS
SYSTOLIC BLOOD PRESSURE: 151 MMHG | BODY MASS INDEX: 31.08 KG/M2 | HEART RATE: 85 BPM | RESPIRATION RATE: 16 BRPM | OXYGEN SATURATION: 99 % | WEIGHT: 222 LBS | HEIGHT: 70.98 IN | TEMPERATURE: 98 F | DIASTOLIC BLOOD PRESSURE: 93 MMHG

## 2025-03-10 DIAGNOSIS — D44.7 PARAGANGLIOMA (HCC): Primary | ICD-10-CM

## 2025-03-10 DIAGNOSIS — D35.01 PHEOCHROMOCYTOMA OF RIGHT ADRENAL GLAND: ICD-10-CM

## 2025-03-10 RX ORDER — LANREOTIDE ACETATE 120 MG/.5ML
120 INJECTION SUBCUTANEOUS ONCE
Start: 2025-03-10 | End: 2025-03-10

## 2025-03-10 RX ORDER — LANREOTIDE ACETATE 120 MG/.5ML
120 INJECTION SUBCUTANEOUS ONCE
Status: COMPLETED | OUTPATIENT
Start: 2025-03-10 | End: 2025-03-10

## 2025-03-10 RX ADMIN — LANREOTIDE ACETATE 120 MG: 120 INJECTION SUBCUTANEOUS at 14:02:00

## 2025-03-10 NOTE — PROGRESS NOTES
Pt arrived amb for lanreotide inj.  Lab drawn and inj given per MD order.  Pt waldemar well. Left amb with future appointments scheduled.    Education Record    Learner:  Patient    Pt here for: lanreotide inj for paraganglioma    Barriers / Limitations:  None    Method:  discussion    General Topics:  Plan of care reviewed    Outcome: Pt tolerated infusion with no c/o.

## 2025-03-12 LAB — CHROMOGRANIN A: 1182 NG/ML

## 2025-03-17 ENCOUNTER — OFFICE VISIT (OUTPATIENT)
Dept: FAMILY MEDICINE CLINIC | Facility: CLINIC | Age: 55
End: 2025-03-17
Payer: COMMERCIAL

## 2025-03-17 VITALS
HEART RATE: 80 BPM | DIASTOLIC BLOOD PRESSURE: 92 MMHG | WEIGHT: 223.19 LBS | RESPIRATION RATE: 16 BRPM | TEMPERATURE: 97 F | OXYGEN SATURATION: 98 % | SYSTOLIC BLOOD PRESSURE: 154 MMHG | HEIGHT: 70 IN | BODY MASS INDEX: 31.95 KG/M2

## 2025-03-17 DIAGNOSIS — Z72.0 TOBACCO USE: ICD-10-CM

## 2025-03-17 DIAGNOSIS — I10 ESSENTIAL HYPERTENSION: ICD-10-CM

## 2025-03-17 DIAGNOSIS — Z00.00 ANNUAL PHYSICAL EXAM: Primary | ICD-10-CM

## 2025-03-17 DIAGNOSIS — D44.7 PARAGANGLIOMA (HCC): ICD-10-CM

## 2025-03-17 DIAGNOSIS — D35.01 PHEOCHROMOCYTOMA OF RIGHT ADRENAL GLAND: ICD-10-CM

## 2025-03-17 DIAGNOSIS — Z28.21 VACCINATION DECLINED: ICD-10-CM

## 2025-03-17 DIAGNOSIS — Z12.11 SCREEN FOR COLON CANCER: ICD-10-CM

## 2025-03-17 RX ORDER — TERAZOSIN 10 MG/1
10 CAPSULE ORAL 2 TIMES DAILY
Qty: 60 CAPSULE | Refills: 0 | Status: SHIPPED | OUTPATIENT
Start: 2025-03-17 | End: 2025-04-16

## 2025-03-17 RX ORDER — LISINOPRIL 10 MG/1
20 TABLET ORAL DAILY
Qty: 180 TABLET | Refills: 0 | Status: SHIPPED | OUTPATIENT
Start: 2025-03-17

## 2025-03-17 RX ORDER — LISINOPRIL 10 MG/1
10 TABLET ORAL DAILY
COMMUNITY
End: 2025-03-17

## 2025-03-17 RX ORDER — AMLODIPINE BESYLATE 5 MG/1
5 TABLET ORAL 2 TIMES DAILY
Qty: 180 TABLET | Refills: 0 | Status: SHIPPED | OUTPATIENT
Start: 2025-03-17

## 2025-03-17 RX ORDER — AMLODIPINE BESYLATE 5 MG/1
5 TABLET ORAL 2 TIMES DAILY
COMMUNITY
Start: 2025-03-01 | End: 2025-03-17

## 2025-03-17 NOTE — PATIENT INSTRUCTIONS
Monitor blood pressure at home, upload reading on [facility]   Take all blood pressure medications as prescribed

## 2025-03-17 NOTE — PROGRESS NOTES
Chief Complaint   Patient presents with    Physical     No concerns          HPI  Jazlyn Hays is a 54 year old male here for physical.    Last PSA:   Recent Results (from the past 194294 hours)   PSA, Total (Screening) [E]    Collection Time: 23  8:23 AM   Result Value Ref Range    Prostate Specific Antigen Screen 1.45 <=4.00 ng/mL     *Note: Due to a large number of results and/or encounters for the requested time period, some results have not been displayed. A complete set of results can be found in Results Review.        Last Colon Cancer screenin2020 to be repeated 1 year ()    Acute concerns: none    Discussed:  - diet: regular diet, low fat diet  - exercise: workout 3-4x/week  - sleep: adequate  - stress: no concerns  - vision: UTD  - dentist visit: needs follow up    ROS  As per HPI      Depression Screening (PHQ-2/PHQ-9): Over the LAST 2 WEEKS   Little interest or pleasure in doing things: Not at all    Feeling down, depressed, or hopeless: Not at all    PHQ-2 SCORE: 0          Past Medical History:    Cancer (HCC)    paraganglioma    Elevated CK    High blood pressure    Other and unspecified hyperlipidemia    Painful urination    Pre-diabetes    Unspecified essential hypertension       Past Surgical History:   Procedure Laterality Date    Appendectomy      Appendectomy      Other  2020    Exploration retroperitoneum, irreversible electroporation/nanoknife retroperitoneal tumor, intraoperative ultrasound.       Social History     Socioeconomic History    Marital status:     Number of children: 2   Occupational History    Occupation: drives trucks and delivery   Tobacco Use    Smoking status: Some Days     Types: Cigars    Smokeless tobacco: Never   Vaping Use    Vaping status: Never Used   Substance and Sexual Activity    Alcohol use: Yes     Comment: occ    Drug use: No    Sexual activity: Not Currently     Partners: Female   Other Topics Concern    Caffeine Concern  Yes     Comment: 1 cup a day    Exercise Yes     Comment: daily       Family History   Problem Relation Age of Onset    Hypertension Mother     Cancer Neg     Lipids Neg     Heart Disorder Neg     Stroke Neg         Medications Ordered Prior to Encounter[1]    Immunization History   Administered Date(s) Administered    Covid-19 Vaccine Moderna 100 mcg/0.5 ml 11/03/2021, 01/08/2022    FLULAVAL 6 months & older 0.5 ml Prefilled syringe ([id number]) 12/18/2019, 12/14/2020    Influenza 01/31/2018           Objective  Vitals:    03/17/25 1554 03/17/25 1630   BP: (!) 154/98 (!) 154/92   Pulse: 80    Resp: 16    Temp: 96.9 °F (36.1 °C)    SpO2: 98%    Weight: 223 lb 3.2 oz (101.2 kg)    Height: 5' 10\" (1.778 m)    Body mass index is 32.03 kg/m².    Physical Exam  Constitutional:       Appearance: Normal appearance.   HEENT:      Head: Normocephalic and atraumatic.      Eyes: PERRLA no notable nystagmus     Ears: normal on observation     Nose: Nose normal.      Mouth: Mucous membranes are moist.      Neck: no lymphadenopathy  Cardiovascular:      Rate and Rhythm: Normal rate and regular rhythm.   Pulmonary:      Effort: Pulmonary effort is normal.      Breath sounds: Normal breath sounds.   Abdominal:      General: Bowel sounds are normal.      Palpations: Abdomen is soft. There is no mass.   Musculoskeletal:         General: Normal range of motion.   Skin:     General: Skin is warm and dry.   Neurological:      General: No focal deficit present.      Mental Status: Alert and oriented to person, place, and time.   Psychiatric:         Mood and Affect: Mood normal.         Thought Content: Thought content normal.     Assessment and Plan  Jazlyn was seen today for physical.    Diagnoses and all orders for this visit:    Annual physical exam  -     TSH W Reflex To Free T4; Future  -     Lipid Panel; Future  -     Comp Metabolic Panel (14); Future  -     CBC With Differential With Platelet; Future  -     Hemoglobin A1C [E];  Future  -     PSA, Total (Screening) [E]; Future    Essential hypertension  Comments:  Takes amlodipine 5mg BID and terazosin  Pt has not been taking lisinopril, refill provided   Monitor BP at home, upload readings on Mychart  Orders:  -     lisinopril 10 MG Oral Tab; Take 2 tablets (20 mg total) by mouth daily.  -     amLODIPine 5 MG Oral Tab; Take 1 tablet (5 mg total) by mouth 2 (two) times daily.    Pheochromocytoma of right adrenal gland  Comments:  Follows with heme/onc  Orders:  -     terazosin 10 MG Oral Cap; Take 1 capsule (10 mg total) by mouth 2 (two) times daily.    Paraganglioma (HCC)  Comments:  Follows with heme/onc    Screen for colon cancer  -     GASTRO - INTERNAL    Tobacco use  Comments:  Discussed smoking cessation    Vaccination declined         Patient presents for annual exam  - General labs: ordered, awaiting results  - Discussed signing up for patient portal as means of communicating with me directly  - Discussed importance of communicating with me if has not heard back with results, etc  - Discussed age-appropriate vaccinations and screenings  - Pt verbalizes understanding, all questions/concerns addressed, in agreement w/plan    Follow up  Return in about 4 weeks (around 4/14/2025) for Blood pressure and medication follow up.      Patient Instructions  Patient Instructions   Monitor blood pressure at home, upload reading on [facility]   Take all blood pressure medications as prescribed       Negrita Nelson MD          [1]   No current outpatient medications on file prior to visit.     No current facility-administered medications on file prior to visit.

## 2025-04-07 ENCOUNTER — OFFICE VISIT (OUTPATIENT)
Age: 55
End: 2025-04-07
Attending: INTERNAL MEDICINE
Payer: COMMERCIAL

## 2025-04-07 VITALS
RESPIRATION RATE: 18 BRPM | HEIGHT: 70.98 IN | SYSTOLIC BLOOD PRESSURE: 127 MMHG | WEIGHT: 224 LBS | TEMPERATURE: 98 F | DIASTOLIC BLOOD PRESSURE: 80 MMHG | OXYGEN SATURATION: 97 % | HEART RATE: 96 BPM | BODY MASS INDEX: 31.36 KG/M2

## 2025-04-07 DIAGNOSIS — I15.8 OTHER SECONDARY HYPERTENSION: ICD-10-CM

## 2025-04-07 DIAGNOSIS — D35.01 PHEOCHROMOCYTOMA OF RIGHT ADRENAL GLAND: Primary | ICD-10-CM

## 2025-04-07 DIAGNOSIS — D44.7 PARAGANGLIOMA (HCC): ICD-10-CM

## 2025-04-07 RX ORDER — LANREOTIDE ACETATE 120 MG/.5ML
120 INJECTION SUBCUTANEOUS ONCE
Status: COMPLETED | OUTPATIENT
Start: 2025-04-07 | End: 2025-04-07

## 2025-04-07 RX ORDER — LANREOTIDE ACETATE 120 MG/.5ML
120 INJECTION SUBCUTANEOUS ONCE
Start: 2025-04-07 | End: 2025-04-07

## 2025-04-07 RX ADMIN — LANREOTIDE ACETATE 120 MG: 120 INJECTION SUBCUTANEOUS at 15:21:00

## 2025-04-07 NOTE — PROGRESS NOTES
Hematology/Oncology Clinic Follow Up Visit    Patient Name: Jazlyn Hays  Medical Record Number: [id number]    YOB: 1970   PCP: Isabella Hunter MD    Reason for Consultation:  Jazlyn Hays was seen today for the diagnosis of unresectable pheochromocytoma    Oncologic History:  9/2020: s/p biopsy of R adrenal gland mass with path consistent with pheochromocytoma. Attempted resection but unresectable due to overlying vascular structures  10/2020: Dotatate PET with increased uptake only at retroperitoneal mass consistent with neuroendocrine tumor.   12/2020: underwent exploration of retroperitoneum with irreversible electroporation of tumor. Started on lanreotide. Was also recommended RT but he declined.    History of Present Illness:      55 y/o M PMH unresectable pheochromocytoma on lanreotide who presents for follow up.    - did not take lexapro; figured that the palpitations at night was due to eating late at night, went away when he stopped  - adherent to BP meds; BP normal today  - has not scheduled his MRI  - denies any abdominal pain or headaches    Past Medical History:  Past Medical History:    Cancer (HCC)    paraganglioma    Elevated CK    High blood pressure    Other and unspecified hyperlipidemia    Painful urination    Pre-diabetes    Unspecified essential hypertension     Past Surgical History:   Procedure Laterality Date    Appendectomy      Appendectomy      Other  12/11/2020    Exploration retroperitoneum, irreversible electroporation/nanoknife retroperitoneal tumor, intraoperative ultrasound.       Home Medications:   lisinopril 10 MG Oral Tab Take 2 tablets (20 mg total) by mouth daily. 180 tablet 0    amLODIPine 5 MG Oral Tab Take 1 tablet (5 mg total) by mouth 2 (two) times daily. 180 tablet 0    terazosin 10 MG Oral Cap Take 1 capsule (10 mg total) by mouth 2 (two) times daily. 60 capsule 0       Allergies:   No Known Allergies    Psychosocial History:  Social History      Socioeconomic History    Marital status:      Spouse name: Not on file    Number of children: 2    Years of education: Not on file    Highest education level: Not on file   Occupational History    Occupation: drives trucks and delivery   Tobacco Use    Smoking status: Some Days     Types: Cigars    Smokeless tobacco: Never   Vaping Use    Vaping status: Never Used   Substance and Sexual Activity    Alcohol use: Yes     Comment: occ    Drug use: No    Sexual activity: Not Currently     Partners: Female   Other Topics Concern     Service Not Asked    Blood Transfusions Not Asked    Caffeine Concern Yes     Comment: 1 cup a day    Occupational Exposure Not Asked    Hobby Hazards Not Asked    Sleep Concern Not Asked    Stress Concern Not Asked    Weight Concern Not Asked    Special Diet Not Asked    Back Care Not Asked    Exercise Yes     Comment: daily    Bike Helmet Not Asked    Seat Belt Not Asked    Self-Exams Not Asked   Social History Narrative    , lives alone    Has 2 sons, lives close by     Social Drivers of Health     Food Insecurity: Not on file   Transportation Needs: Not on file   Housing Stability: Not on file       Family Medical History:  Family History   Problem Relation Age of Onset    Hypertension Mother     Cancer Neg     Lipids Neg     Heart Disorder Neg     Stroke Neg        Review of Systems:  A 10-point ROS was done with pertinent positives and negative per the HPI    Vital Signs:  Height: 180.3 cm (5' 10.98\") (04/07 1442)  Weight: 101.6 kg (224 lb) (04/07 1442)  BSA (Calculated - sq m): 2.21 sq meters (04/07 1442)  Pulse: 96 (04/07 1442)  BP: 127/80 (04/07 1442)  Temp: 97.9 °F (36.6 °C) (04/07 1442)  Do Not Use - Resp Rate: --  SpO2: 97 % (04/07 1442)    Wt Readings from Last 6 Encounters:   04/07/25 101.6 kg (224 lb)   03/17/25 101.2 kg (223 lb 3.2 oz)   03/10/25 100.7 kg (222 lb)   02/10/25 100.9 kg (222 lb 8 oz)   12/16/24 99.3 kg (219 lb)   09/23/24 100.4 kg (221  lb 6.4 oz)       ECOG PS: 1    Physical Examination:  General: Patient is alert and oriented, not in acute distress  Psych: Mood and affect are appropriate  Eyes: EOMI, PERRL  ENT: Oropharynx is clear, no adenopathy  CV: no LE edema  Respiratory: Non-labored respirations  Neurological: Grossly intact   MSK: Unable to flex elbow further beyond a certain point.   Skin: no rashes or petechiae    Laboratory:  Lab Results   Component Value Date    WBC 7.8 08/07/2023    WBC 8.0 06/23/2023    WBC 10.3 03/31/2023    HGB 13.3 08/07/2023    HGB 14.1 06/23/2023    HGB 13.5 03/31/2023    HCT 39.9 08/07/2023    MCV 88.3 08/07/2023    MCH 29.4 08/07/2023    MCHC 33.3 08/07/2023    RDW 12.2 08/07/2023    .0 08/07/2023    .0 06/23/2023    .0 03/31/2023     Lab Results   Component Value Date     (H) 08/07/2023    BUN 17 08/07/2023    BUNCREA 10.1 07/09/2021    CREATSERUM 1.37 (H) 08/07/2023    CREATSERUM 1.32 (H) 06/23/2023    CREATSERUM 1.45 (H) 03/31/2023    ANIONGAP 7 08/07/2023    GFR 70 06/21/2017    GFRNAA 65 10/07/2021    GFRAA 75 10/07/2021    CA 9.5 08/07/2023    OSMOCALC 294 08/07/2023    ALKPHO 94 08/07/2023    AST 43 (H) 08/07/2023    ALT 65 (H) 08/07/2023    BILT 0.4 08/07/2023    TP 7.7 08/07/2023    ALB 4.0 08/07/2023    GLOBULIN 3.7 08/07/2023    AGRATIO 1.5 09/14/2019     08/07/2023    K 4.0 08/07/2023     08/07/2023    CO2 24.0 08/07/2023     No results found for: \"PTT\", \"PT\", \"INR\"      Impression & Plan:     Unresectable pheochromocytoma  - Unresectable due to overlying vascular structures, and has received irreversible electroporation  - has not received RT to this mass; he declined  - restaging MRI abdomen has been stable; continue monthly lanreotide  - if there is growth, pt will need RT for disease control; previously discussed  - repeat MRI abdomen    Secondary hypertension  - on terazosin, metoprolol and amlodipine. BP excellent today. Continue f/u with PCP    Follow  up: monthly SSA. F/u in 6 mos    Jason Munoz MD  [facility] Hematology Oncology

## 2025-04-07 NOTE — PROGRESS NOTES
Education Record    Learner:  Patient    Disease / Diagnosis: Paraganglioma    Barriers / Limitations:  None   Comments:    Method:  Discussion   Comments:    General Topics:  Medication, Side effects and symptom management, and Plan of care reviewed   Comments:    Outcome:  Shows understanding   Comments:    Here for follow up. Feeling well overall. BP great today. He did not start the lexapro and feels better. Mri to be scheduled.

## 2025-04-10 LAB — CHROMOGRANIN A: 1186 NG/ML

## 2025-04-16 ENCOUNTER — TELEPHONE (OUTPATIENT)
Dept: FAMILY MEDICINE CLINIC | Facility: CLINIC | Age: 55
End: 2025-04-16

## 2025-04-28 NOTE — TELEPHONE ENCOUNTER
Hypertension Medications Protocol [id number]/28/2025 10:01 AM   Protocol Details CMP or BMP in past 12 months    EGFRCR or GFRAA > 50    Medication is active on med list    Last BP reading less than 140/90    In person appointment or virtual visit in the         Due for fasting labs  Ordered 3/17/25

## 2025-04-28 NOTE — TELEPHONE ENCOUNTER
Last office visit 3/17/25  Last refilled on 3/17/25 for # 60 with 0 refills  Future Appointments   Date Time Provider Department Center   5/5/2025  3:30 PM NP TX RN1 NP SW [facility]   5/20/2025  5:30 PM E[facility] MR RM4 (3T WIDE) [facility]   6/2/2025  3:30 PM NP TX RN3 NP SW [facility]   6/30/2025  3:30 PM NP TX RN3 NP [facility]   7/28/2025  3:30 PM NP TX RN3 NP [facility]   8/25/2025  3:30 PM NP TX RN2 NP [facility]   9/22/2025  3:30 PM NP OOT NP [facility]   9/22/2025  3:45 PM Jason Munoz MD NP [facility]   9/22/2025  4:00 PM NP TX RN3 NP [facility]        Thank you.

## 2025-05-01 NOTE — TELEPHONE ENCOUNTER
Scheduled   Future Appointments   Date Time Provider Department Center   5/3/2025  9:15 AM [facility] NURSE [facility]   5/5/2025  3:30 PM NP TX RN1 NP SW [facility]   5/20/2025  5:30 PM EH MR RM4 (3T WIDE) [facility]   6/2/2025  3:30 PM NP TX RN3 NP SW [facility]   6/30/2025  3:30 PM NP TX RN3 NP [facility]   7/28/2025  3:30 PM NP TX RN3 NP [facility]   8/25/2025  3:30 PM NP TX RN2 NP [facility]   9/22/2025  3:30 PM NP OOT NP [facility]   9/22/2025  3:45 PM Jason Munoz MD NP [facility]   9/22/2025  4:00 PM NP TX RN3 NP SW [facility]

## 2025-05-03 ENCOUNTER — NURSE ONLY (OUTPATIENT)
Dept: FAMILY MEDICINE CLINIC | Facility: CLINIC | Age: 55
End: 2025-05-03
Payer: COMMERCIAL

## 2025-05-03 DIAGNOSIS — Z00.00 ANNUAL PHYSICAL EXAM: ICD-10-CM

## 2025-05-03 LAB
ALBUMIN SERPL-MCNC: 5.4 G/DL (ref 3.2–4.8)
ALBUMIN/GLOB SERPL: 1.9 {RATIO} (ref 1–2)
ALP LIVER SERPL-CCNC: 93 U/L (ref 45–117)
ALT SERPL-CCNC: 71 U/L (ref 10–49)
ANION GAP SERPL CALC-SCNC: 12 MMOL/L (ref 0–18)
AST SERPL-CCNC: 66 U/L (ref ?–34)
BASOPHILS # BLD AUTO: 0.06 X10(3) UL (ref 0–0.2)
BASOPHILS NFR BLD AUTO: 0.9 %
BILIRUB SERPL-MCNC: 0.6 MG/DL (ref 0.3–1.2)
BUN BLD-MCNC: 15 MG/DL (ref 9–23)
CALCIUM BLD-MCNC: 10.3 MG/DL (ref 8.7–10.6)
CHLORIDE SERPL-SCNC: 105 MMOL/L (ref 98–112)
CHOLEST SERPL-MCNC: 242 MG/DL (ref ?–200)
CO2 SERPL-SCNC: 24 MMOL/L (ref 21–32)
COMPLEXED PSA SERPL-MCNC: 1.86 NG/ML (ref ?–4)
CREAT BLD-MCNC: 1.43 MG/DL (ref 0.7–1.3)
EGFRCR SERPLBLD CKD-EPI 2021: 59 ML/MIN/1.73M2 (ref 60–?)
EOSINOPHIL # BLD AUTO: 0.12 X10(3) UL (ref 0–0.7)
EOSINOPHIL NFR BLD AUTO: 1.8 %
ERYTHROCYTE [DISTWIDTH] IN BLOOD BY AUTOMATED COUNT: 12.5 %
EST. AVERAGE GLUCOSE BLD GHB EST-MCNC: 128 MG/DL (ref 68–126)
FASTING PATIENT LIPID ANSWER: YES
FASTING STATUS PATIENT QL REPORTED: YES
GLOBULIN PLAS-MCNC: 2.8 G/DL (ref 2–3.5)
GLUCOSE BLD-MCNC: 124 MG/DL (ref 70–99)
HBA1C MFR BLD: 6.1 % (ref ?–5.7)
HCT VFR BLD AUTO: 38.1 % (ref 39–53)
HDLC SERPL-MCNC: 50 MG/DL (ref 40–59)
HGB BLD-MCNC: 12.9 G/DL (ref 13–17.5)
IMM GRANULOCYTES # BLD AUTO: 0.02 X10(3) UL (ref 0–1)
IMM GRANULOCYTES NFR BLD: 0.3 %
LDLC SERPL CALC-MCNC: 174 MG/DL (ref ?–100)
LYMPHOCYTES # BLD AUTO: 2.65 X10(3) UL (ref 1–4)
LYMPHOCYTES NFR BLD AUTO: 40.8 %
MCH RBC QN AUTO: 29.5 PG (ref 26–34)
MCHC RBC AUTO-ENTMCNC: 33.9 G/DL (ref 31–37)
MCV RBC AUTO: 87.2 FL (ref 80–100)
MONOCYTES # BLD AUTO: 0.55 X10(3) UL (ref 0.1–1)
MONOCYTES NFR BLD AUTO: 8.5 %
NEUTROPHILS # BLD AUTO: 3.1 X10 (3) UL (ref 1.5–7.7)
NEUTROPHILS # BLD AUTO: 3.1 X10(3) UL (ref 1.5–7.7)
NEUTROPHILS NFR BLD AUTO: 47.7 %
NONHDLC SERPL-MCNC: 192 MG/DL (ref ?–130)
OSMOLALITY SERPL CALC.SUM OF ELEC: 294 MOSM/KG (ref 275–295)
PLATELET # BLD AUTO: 276 10(3)UL (ref 150–450)
POTASSIUM SERPL-SCNC: 4.3 MMOL/L (ref 3.5–5.1)
PROT SERPL-MCNC: 8.2 G/DL (ref 5.7–8.2)
RBC # BLD AUTO: 4.37 X10(6)UL (ref 4.3–5.7)
SODIUM SERPL-SCNC: 141 MMOL/L (ref 136–145)
TRIGL SERPL-MCNC: 100 MG/DL (ref 30–149)
TSI SER-ACNC: 1.4 UIU/ML (ref 0.55–4.78)
VLDLC SERPL CALC-MCNC: 20 MG/DL (ref 0–30)
WBC # BLD AUTO: 6.5 X10(3) UL (ref 4–11)

## 2025-05-03 PROCEDURE — 85025 COMPLETE CBC W/AUTO DIFF WBC: CPT

## 2025-05-03 PROCEDURE — 84443 ASSAY THYROID STIM HORMONE: CPT

## 2025-05-03 PROCEDURE — 83036 HEMOGLOBIN GLYCOSYLATED A1C: CPT

## 2025-05-03 PROCEDURE — 80053 COMPREHEN METABOLIC PANEL: CPT

## 2025-05-03 PROCEDURE — 80061 LIPID PANEL: CPT

## 2025-05-03 NOTE — PROGRESS NOTES
Jazlyn Hays presents today for nurse visit. Labs ordered by Dr. Nelson.  Lavender, green, and gold tubes drawn from left ac area with a butterfly needle. Patient tolerated well. Left office in stable condition.

## 2025-05-04 RX ORDER — TERAZOSIN 10 MG/1
10 CAPSULE ORAL 2 TIMES DAILY
Qty: 180 CAPSULE | Refills: 0 | Status: SHIPPED | OUTPATIENT
Start: 2025-05-04

## 2025-05-05 ENCOUNTER — OFFICE VISIT (OUTPATIENT)
Age: 55
End: 2025-05-05
Attending: INTERNAL MEDICINE
Payer: COMMERCIAL

## 2025-05-05 DIAGNOSIS — D35.01 PHEOCHROMOCYTOMA OF RIGHT ADRENAL GLAND: Primary | ICD-10-CM

## 2025-05-05 DIAGNOSIS — D44.7 PARAGANGLIOMA (HCC): ICD-10-CM

## 2025-05-05 RX ORDER — LANREOTIDE ACETATE 120 MG/.5ML
120 INJECTION SUBCUTANEOUS ONCE
Status: COMPLETED | OUTPATIENT
Start: 2025-05-05 | End: 2025-05-05

## 2025-05-05 RX ORDER — LANREOTIDE ACETATE 120 MG/.5ML
120 INJECTION SUBCUTANEOUS ONCE
Start: 2025-05-05 | End: 2025-05-05

## 2025-05-05 RX ADMIN — LANREOTIDE ACETATE 120 MG: 120 INJECTION SUBCUTANEOUS at 14:51:00

## 2025-05-05 NOTE — PROGRESS NOTES
Education Record    Learner:  Patient    Disease / Diagnosis:lanreotide injection, pl    Barriers / Limitations:  None   Comments:    Method:  Discussion   Comments:    General Topics:  Medication and Plan of care reviewed   Comments:    Outcome:  Shows understanding   Comments:

## 2025-05-08 LAB — CHROMOGRANIN A: 1364 NG/ML

## 2025-05-20 ENCOUNTER — HOSPITAL ENCOUNTER (OUTPATIENT)
Dept: MRI IMAGING | Facility: HOSPITAL | Age: 55
Discharge: HOME | End: 2025-05-20
Attending: INTERNAL MEDICINE
Payer: COMMERCIAL

## 2025-05-20 DIAGNOSIS — D35.01 PHEOCHROMOCYTOMA OF RIGHT ADRENAL GLAND: ICD-10-CM

## 2025-05-20 PROCEDURE — A9575 INJ GADOTERATE MEGLUMI 0.1ML: HCPCS | Performed by: INTERNAL MEDICINE

## 2025-05-20 PROCEDURE — 74183 MRI ABD W/O CNTR FLWD CNTR: CPT | Performed by: INTERNAL MEDICINE

## 2025-05-20 RX ORDER — GADOTERATE MEGLUMINE 376.9 MG/ML
20 INJECTION INTRAVENOUS
Status: COMPLETED | OUTPATIENT
Start: 2025-05-20 | End: 2025-05-20

## 2025-05-20 RX ADMIN — GADOTERATE MEGLUMINE 20 ML: 376.9 INJECTION INTRAVENOUS at 18:22:00

## 2025-05-23 ENCOUNTER — TELEPHONE (OUTPATIENT)
Dept: FAMILY MEDICINE CLINIC | Facility: CLINIC | Age: 55
End: 2025-05-23

## 2025-05-23 DIAGNOSIS — R79.89 ELEVATED SERUM CREATININE: ICD-10-CM

## 2025-05-23 DIAGNOSIS — R73.09 ELEVATED GLUCOSE: ICD-10-CM

## 2025-05-23 DIAGNOSIS — R79.89 ELEVATED LFTS: Primary | ICD-10-CM

## 2025-05-23 DIAGNOSIS — E78.5 ELEVATED LIPIDS: ICD-10-CM

## 2025-05-23 DIAGNOSIS — R74.8 ELEVATED CREATINE KINASE: ICD-10-CM

## 2025-05-23 PROBLEM — E78.2 MIXED HYPERLIPIDEMIA: Status: ACTIVE | Noted: 2025-05-23

## 2025-05-23 RX ORDER — ATORVASTATIN CALCIUM 10 MG/1
10 TABLET, FILM COATED ORAL NIGHTLY
Qty: 90 TABLET | Refills: 0 | Status: SHIPPED | OUTPATIENT
Start: 2025-05-23

## 2025-05-23 NOTE — TELEPHONE ENCOUNTER
----- Message from Negrita Nelson sent at 5/23/2025  2:54 AM CDT -----  CBC shows hgb and hematocrit are mildly decreased, will cont to monitor this  CMP shows elevated blood glucose, creatinine and liver enzymes. Recommend low carb/low sugar diet, oral hydration, avoid NSAIDs, low fat diet, avoid alcohol  Hemoglobin A1C shows pre-diabetes, recommend low carb/low sugar diet  Thyroid function is normal  Lipid panel shows elevated total cholesterol and LDL (bad cholesterol), recommend starting atorvastatin 10mg daily along with low fat diet and exercise   PSA is normal    Repeat CMP, A1C, lipid panel in 3 months      ----- Message -----  From: Lab, Background User  Sent: 5/3/2025   3:27 PM CDT  To: Negrita Nelson MD

## 2025-06-02 ENCOUNTER — OFFICE VISIT (OUTPATIENT)
Age: 55
End: 2025-06-02
Attending: INTERNAL MEDICINE
Payer: COMMERCIAL

## 2025-06-02 VITALS
RESPIRATION RATE: 18 BRPM | DIASTOLIC BLOOD PRESSURE: 87 MMHG | WEIGHT: 213 LBS | HEART RATE: 104 BPM | HEIGHT: 70.98 IN | TEMPERATURE: 98 F | OXYGEN SATURATION: 97 % | SYSTOLIC BLOOD PRESSURE: 137 MMHG | BODY MASS INDEX: 29.82 KG/M2

## 2025-06-02 DIAGNOSIS — D35.01 PHEOCHROMOCYTOMA OF RIGHT ADRENAL GLAND: ICD-10-CM

## 2025-06-02 DIAGNOSIS — D44.7 PARAGANGLIOMA (HCC): Primary | ICD-10-CM

## 2025-06-02 RX ORDER — LANREOTIDE ACETATE 120 MG/.5ML
120 INJECTION SUBCUTANEOUS ONCE
Status: COMPLETED | OUTPATIENT
Start: 2025-06-02 | End: 2025-06-02

## 2025-06-02 RX ORDER — LANREOTIDE ACETATE 120 MG/.5ML
120 INJECTION SUBCUTANEOUS ONCE
Start: 2025-06-02 | End: 2025-06-02

## 2025-06-02 RX ADMIN — LANREOTIDE ACETATE 120 MG: 120 INJECTION SUBCUTANEOUS at 15:14:00

## 2025-06-04 LAB — CHROMOGRANIN A: 1135 NG/ML

## 2025-06-10 DIAGNOSIS — I10 ESSENTIAL HYPERTENSION: ICD-10-CM

## 2025-06-10 RX ORDER — LISINOPRIL 10 MG/1
20 TABLET ORAL DAILY
Qty: 60 TABLET | Refills: 0 | Status: SHIPPED | OUTPATIENT
Start: 2025-06-10

## 2025-06-10 NOTE — TELEPHONE ENCOUNTER
Fax from [facility] requesting refill   Lisinopril last refilled 3/17/25  No future appointment in office  LOV with  3/17/25  Last CMP 5/3/25 -blood work to be repeated in 3 months    BP Readings from Last 3 Encounters:   06/02/25 137/87   04/07/25 127/80   03/17/25 (!) 154/92          Hypertension Medications Protocol Wpiwuw84/10/2025 03:16 PM   Protocol Details CMP or BMP in past 12 months    Last BP reading less than 140/90    In person appointment or virtual visit in the past 12 mos or appointment in next 3 mos    EGFRCR or GFRAA > 50    Medication is active on med list

## 2025-06-11 DIAGNOSIS — I10 ESSENTIAL HYPERTENSION: ICD-10-CM

## 2025-06-11 RX ORDER — AMLODIPINE BESYLATE 5 MG/1
5 TABLET ORAL 2 TIMES DAILY
Qty: 180 TABLET | Refills: 0 | Status: SHIPPED | OUTPATIENT
Start: 2025-06-11

## 2025-06-11 NOTE — TELEPHONE ENCOUNTER
Patient advised. Verbalized understanding.     Future Appointments   Date Time Provider Department Center   6/18/2025  3:40 PM Negrita Nelson MD [facility] [place]   6/30/2025  3:30 PM NP TX RN3 NP SW [facility]   7/28/2025  3:30 PM NP TX RN3 NP SW [facility]   8/25/2025  3:30 PM NP TX RN2 NP SW [facility]   9/22/2025  3:30 PM NP OOT NP [facility]   9/22/2025  3:45 PM Jason Munoz MD NP [facility]   9/22/2025  4:00 PM NP TX RN3 NP SW [facility]

## 2025-06-18 ENCOUNTER — OFFICE VISIT (OUTPATIENT)
Dept: FAMILY MEDICINE CLINIC | Facility: CLINIC | Age: 55
End: 2025-06-18
Payer: COMMERCIAL

## 2025-06-18 VITALS
TEMPERATURE: 97 F | OXYGEN SATURATION: 97 % | HEART RATE: 86 BPM | SYSTOLIC BLOOD PRESSURE: 138 MMHG | BODY MASS INDEX: 30.1 KG/M2 | WEIGHT: 215 LBS | RESPIRATION RATE: 16 BRPM | DIASTOLIC BLOOD PRESSURE: 86 MMHG | HEIGHT: 70.98 IN

## 2025-06-18 DIAGNOSIS — Z12.11 SCREEN FOR COLON CANCER: ICD-10-CM

## 2025-06-18 DIAGNOSIS — R73.03 PREDIABETES: ICD-10-CM

## 2025-06-18 DIAGNOSIS — E78.2 MIXED HYPERLIPIDEMIA: ICD-10-CM

## 2025-06-18 DIAGNOSIS — I10 ESSENTIAL HYPERTENSION: Primary | ICD-10-CM

## 2025-06-18 DIAGNOSIS — Z28.21 VACCINATION DECLINED: ICD-10-CM

## 2025-06-18 PROCEDURE — 3008F BODY MASS INDEX DOCD: CPT

## 2025-06-18 PROCEDURE — 3075F SYST BP GE 130 - 139MM HG: CPT

## 2025-06-18 PROCEDURE — 99213 OFFICE O/P EST LOW 20 MIN: CPT

## 2025-06-18 PROCEDURE — 3079F DIAST BP 80-89 MM HG: CPT

## 2025-06-18 NOTE — PATIENT INSTRUCTIONS
I would like to have the patient get a CT calcium heart score to assess the coronary arteries. If the score is normal then no need for medicine, if the score is abnormal then we may need to start cholesterol lowering medication to decrease the risk of heart disease. It can be done at [facility] in [place]. Insurance may not cover it. Cost is about $50. Order placed.    Call [facility] to set up an appointment  P: [phone]

## 2025-06-30 ENCOUNTER — OFFICE VISIT (OUTPATIENT)
Age: 55
End: 2025-06-30
Attending: INTERNAL MEDICINE
Payer: COMMERCIAL

## 2025-06-30 VITALS
OXYGEN SATURATION: 98 % | DIASTOLIC BLOOD PRESSURE: 96 MMHG | SYSTOLIC BLOOD PRESSURE: 155 MMHG | HEART RATE: 85 BPM | TEMPERATURE: 98 F

## 2025-06-30 DIAGNOSIS — D35.01 PHEOCHROMOCYTOMA OF RIGHT ADRENAL GLAND: Primary | ICD-10-CM

## 2025-06-30 DIAGNOSIS — D44.7 PARAGANGLIOMA (HCC): ICD-10-CM

## 2025-06-30 RX ORDER — LANREOTIDE ACETATE 120 MG/.5ML
120 INJECTION SUBCUTANEOUS ONCE
Start: 2025-06-30 | End: 2025-06-30

## 2025-06-30 RX ORDER — LANREOTIDE ACETATE 120 MG/.5ML
120 INJECTION SUBCUTANEOUS ONCE
Status: COMPLETED | OUTPATIENT
Start: 2025-06-30 | End: 2025-06-30

## 2025-06-30 RX ADMIN — LANREOTIDE ACETATE 120 MG: 120 INJECTION SUBCUTANEOUS at 15:05:00

## 2025-06-30 NOTE — PROGRESS NOTES
Education Record    Learner:  Patient    Disease / Diagnosis: Lanreotide inj    Barriers / Limitations:  None   Comments:    Method:  Brief focused   Comments:    General Topics:  Side effects and symptom management   Comments:    Outcome:  Shows understanding   Comments:

## 2025-07-02 LAB — CHROMOGRANIN A: 1198 NG/ML

## 2025-07-10 DIAGNOSIS — I10 ESSENTIAL HYPERTENSION: ICD-10-CM

## 2025-07-10 RX ORDER — LISINOPRIL 10 MG/1
20 TABLET ORAL DAILY
Qty: 60 TABLET | Refills: 0 | Status: SHIPPED | OUTPATIENT
Start: 2025-07-10

## 2025-07-10 NOTE — TELEPHONE ENCOUNTER
LOV: 6/18/25 for BP      lisinopril 10 MG Oral Tab  Take 2 tablets (20 mg total) by mouth daily. Dispense: 60 tablet, Refills: 0 ordered        06/10/2025     Future Appointments   Date Time Provider Department Center   7/28/2025  3:30 PM NP TX RN3 NP  [facility]   8/25/2025  3:30 PM NP TX RN2 NP  [facility]   9/22/2025  3:30 PM NP OOT NP  [facility]   9/22/2025  3:45 PM Jason Munoz MD NP  [facility]   9/22/2025  4:00 PM NP TX RN3 NP  [facility]

## 2025-07-18 ENCOUNTER — TELEPHONE (OUTPATIENT)
Dept: FAMILY MEDICINE CLINIC | Facility: CLINIC | Age: 55
End: 2025-07-18

## 2025-07-18 NOTE — TELEPHONE ENCOUNTER
Imaging due  Mychart message sent  Future Appointments   Date Time Provider Department Center   7/28/2025  3:30 PM NP TX RN3 NP SW [facility]   8/25/2025  3:30 PM NP TX RN2 NP [facility]   9/22/2025  3:30 PM NP OOT NP [facility]   9/22/2025  3:45 PM Jason Munoz MD NP [facility]   9/22/2025  4:00 PM NP TX RN3 NP [facility]

## 2025-07-28 ENCOUNTER — OFFICE VISIT (OUTPATIENT)
Facility: LOCATION | Age: 55
End: 2025-07-28
Attending: INTERNAL MEDICINE
Payer: COMMERCIAL

## 2025-07-28 VITALS
HEIGHT: 70.98 IN | HEART RATE: 90 BPM | SYSTOLIC BLOOD PRESSURE: 131 MMHG | TEMPERATURE: 97 F | OXYGEN SATURATION: 97 % | WEIGHT: 206 LBS | BODY MASS INDEX: 28.84 KG/M2 | DIASTOLIC BLOOD PRESSURE: 73 MMHG | RESPIRATION RATE: 18 BRPM

## 2025-07-28 DIAGNOSIS — D44.7 PARAGANGLIOMA (HCC): ICD-10-CM

## 2025-07-28 DIAGNOSIS — D35.01 PHEOCHROMOCYTOMA OF RIGHT ADRENAL GLAND: Primary | ICD-10-CM

## 2025-07-28 RX ORDER — LANREOTIDE ACETATE 120 MG/.5ML
120 INJECTION SUBCUTANEOUS ONCE
Status: COMPLETED | OUTPATIENT
Start: 2025-07-28 | End: 2025-07-28

## 2025-07-28 RX ORDER — LANREOTIDE ACETATE 120 MG/.5ML
120 INJECTION SUBCUTANEOUS ONCE
Start: 2025-07-28 | End: 2025-07-28

## 2025-07-28 RX ADMIN — LANREOTIDE ACETATE 120 MG: 120 INJECTION SUBCUTANEOUS at 15:00:00

## 2025-07-28 NOTE — PROGRESS NOTES
Education Record    Learner:  Patient    Disease / Diagnosis: pheochromocytoma of right adrenal gland    Barriers / Limitations:  None   Comments:    Method:  Discussion   Comments:    General Topics:  Medication, Side effects and symptom management, Plan of care reviewed, and Fall risk and prevention   Comments:    Outcome:  Shows understanding   Comments:    Lanreotide inj tolerated well. Pt discharged ambulatory in stable condition.

## 2025-07-30 LAB — CHROMOGRANIN A: 1404 NG/ML

## 2025-08-13 RX ORDER — TERAZOSIN 10 MG/1
10 CAPSULE ORAL 2 TIMES DAILY
Qty: 180 CAPSULE | Refills: 2 | Status: SHIPPED | OUTPATIENT
Start: 2025-08-13

## 2025-08-23 ENCOUNTER — TELEPHONE (OUTPATIENT)
Dept: FAMILY MEDICINE CLINIC | Facility: CLINIC | Age: 55
End: 2025-08-23

## 2025-08-25 ENCOUNTER — OFFICE VISIT (OUTPATIENT)
Facility: LOCATION | Age: 55
End: 2025-08-25
Attending: INTERNAL MEDICINE

## 2025-08-25 DIAGNOSIS — D35.01 PHEOCHROMOCYTOMA OF RIGHT ADRENAL GLAND: Primary | ICD-10-CM

## 2025-08-25 DIAGNOSIS — D44.7 PARAGANGLIOMA (HCC): ICD-10-CM

## 2025-08-25 RX ORDER — LANREOTIDE ACETATE 120 MG/.5ML
120 INJECTION SUBCUTANEOUS ONCE
Start: 2025-08-25 | End: 2025-08-25

## 2025-08-25 RX ORDER — LANREOTIDE ACETATE 120 MG/.5ML
120 INJECTION SUBCUTANEOUS ONCE
Status: COMPLETED | OUTPATIENT
Start: 2025-08-25 | End: 2025-08-25

## 2025-08-25 RX ADMIN — LANREOTIDE ACETATE 120 MG: 120 INJECTION SUBCUTANEOUS at 14:39:00

## 2025-08-28 LAB — CHROMOGRANIN A: 1107 NG/ML

## (undated) DEVICE — ENDOSCOPY PACK UPPER: Brand: MEDLINE INDUSTRIES, INC.

## (undated) DEVICE — HANDLE LIGHT ECONOMY

## (undated) DEVICE — SUTURE PROLENE 2-0 MH

## (undated) DEVICE — SOL  .9 1000ML BTL

## (undated) DEVICE — 1200CC GUARDIAN II: Brand: GUARDIAN

## (undated) DEVICE — HEMOCLIP HORIZON MED 002200

## (undated) DEVICE — HIPEC PACK: Brand: MEDLINE INDUSTRIES, INC.

## (undated) DEVICE — TOWEL OR BLU 16X26 STRL

## (undated) DEVICE — REM POLYHESIVE ADULT PATIENT RETURN ELECTRODE: Brand: VALLEYLAB

## (undated) DEVICE — STERILE SYNTHETIC POLYISOPRENE POWDER-FREE SURGICAL GLOVES WITH HYDROGEL COATING, SMOOTH FINISH, STRAIGHT FINGER: Brand: PROTEXIS

## (undated) DEVICE — MEDI-VAC SUCTION HANDLE REGULAR CAPACITY: Brand: CARDINAL HEALTH

## (undated) DEVICE — SUTURE VICRYL 2-0 SH

## (undated) DEVICE — HEMOCLIP HORIZON LG 004200

## (undated) DEVICE — ENDOSCOPIC ULTRASOUND FINE NEEDLE BIOPSY (FNB) DEVICE: Brand: ACQUIRE

## (undated) DEVICE — KENDALL SCD EXPRESS SLEEVES, KNEE LENGTH, MEDIUM: Brand: KENDALL SCD

## (undated) DEVICE — SURGICEL SNOW ABS 4X4

## (undated) DEVICE — PAD SACRAL SPAN AID

## (undated) DEVICE — SUTURE VICRYL 3-0 SH

## (undated) DEVICE — FILTERLINE NASAL ADULT O2/CO2

## (undated) DEVICE — ABSORBABLE HEMOSTAT (OXIDIZED REGENERATED CELLULOSE, U.S.P.): Brand: SURGICEL

## (undated) DEVICE — SUTURE PROLENE 4-0 RB-1

## (undated) DEVICE — PROBE NANO KNIFE ELECTRODE

## (undated) DEVICE — SUTURE PROLENE 5-0 RB-1

## (undated) DEVICE — 3M™ RED DOT™ MONITORING ELECTRODE WITH FOAM TAPE AND STICKY GEL, 50/BAG, 20/CASE, 72/PLT 2570: Brand: RED DOT™

## (undated) DEVICE — LIGHT HANDLE

## (undated) DEVICE — STERILE POLYISOPRENE POWDER-FREE SURGICAL GLOVES: Brand: PROTEXIS

## (undated) DEVICE — SUTURE PDS II 1 TP-1

## (undated) DEVICE — ELECTRD KNIFE NANO SINGL SPACR

## (undated) DEVICE — SYRINGE 60ML SLIP TIP

## (undated) DEVICE — Device

## (undated) DEVICE — HEMOCLIP HORIZON SM MULTI

## (undated) DEVICE — PROXIMATE SKIN STAPLERS (35 WIDE) CONTAINS 35 STAINLESS STEEL STAPLES (FIXED HEAD): Brand: PROXIMATE

## (undated) DEVICE — HARMONIC ACE +7 SHEARS ADVANCED HEMOSTASIS 5MM DIAMETER 23CM SHAFT LENGTH  FOR USE WITH GRAY HAND PIECE ONLY: Brand: HARMONIC ACE

## (undated) DEVICE — MEDI-VAC NON-CONDUCTIVE SUCTION TUBING: Brand: CARDINAL HEALTH

## (undated) DEVICE — INTENDED TO BE USED TO OCCLUDE, RETRACT AND IDENTIFY ARTERIES, VEINS, TENDONS AND NERVES IN SURGICAL PROCEDURES: Brand: STERION®  VESSEL LOOP

## (undated) NOTE — LETTER
Perri Sanchez [address]  [address], [address] [facility]  Authorization for Surgical Operation and Procedure     Date:___________                                                                                                         Time:__________ potential risks that can occur: fever and allergic reactions, hemolytic reactions, transmission of diseases such as Hepatitis, AIDS and Cytomegalovirus (CMV) and fluid overload.   In the event that I wish to have an autologous transfusion of my own blood, o attending physician will determine when the applicable recovery period ends for purposes of reinstating the DNAR order.   10. Patients having a sterilization procedure: I understand that if the procedure is successful the results will be permanent and it wi a. Allow the anesthesiologist (anesthesia doctor) to give me medicine and do additional procedures as necessary.  Some examples are: Starting or using an “IV” to give me medicine, fluids or blood during my procedure, and having a breathing tube placed to he 7. Regional Anesthesia (“spinal”, “epidural”, & “nerve blocks”): I understand that rare but potential complications include headache, bleeding, infection, seizure, irregular heart rhythms, and nerve injury.     I can change my mind about having anesthesia

## (undated) NOTE — ED AVS SNAPSHOT
[facility] in [address] [address]    Phone:  [phone]    Fax:  Tian Diggs   MRN: [id number]    [facility]   Date of Visit:  3/29/2017           Clarissa Thayer To Check ER Wait Times:  TEXT 'ERwait' to 78081      Click [email or web address]      Or call [phone]    If you have any problems with your follow-up, please call our  at [phone].     Si usted tiene algun problema con arellano sequimiento, por I have read and understand the instructions given to me by my caregivers. 24-Hour Pharmacies        Pharmacy Address Phone Number   [address]  [address] ([address]) Jessa Downs Support Staff. Remember, [employer] is NOT to be used for urgent needs. For medical emergencies, dial 911. Visit [email or web address] to learn more.

## (undated) NOTE — MR AVS SNAPSHOT
[address] [facility] [address] [phone]               Thank you for choosing us for your health care visit with Nicole Ojeda MD.  We are glad to serve you and happy to provide you with this summary of your v Ophthalmology Referral - In Network    Complete by:  As directed        Lipid Panel [E]    Complete by:  Jan 06, 2017 (Approximate)        COMP METABOLIC PANEL [10431] [Q]    Complete by:  Jan 06, 2017 (Approximate)        CK (Creatine Kinase) (Not Creati schedule your appointment. Failure to obtain required authorization numbers can create reimbursement difficulties for you.       Referral Information     Referral Order Referred to Address [facility] Phone Visits Status Diagnosis                          RHEUMATO active are less likely to develop some chronic diseases than adults who are inactive.      HOW TO GET STARTED: HOW TO STAY MOTIVATED:   Start activities slowly and build up over time Do what you like   Get your heart pumping – brisk walking, biking, swimmin

## (undated) NOTE — LETTER
06/21/24    Jazlyn Hays   [address] [facility] [facility] [address]           Dear Jazlyn Hays     Our records indicate that you have outstanding lab work and/or testing that was ordered for you and has not yet been completed:  Lab Frequency Next Occurrence        CBC W Differential W Platelet [E] Once 04/18/2024   Lipid Panel [E] Once 04/18/2024   TSH and Free T4 [E] Once 04/18/2024   Comp Metabolic Panel (14) [E] Once 04/18/2024     To provide you with the best possible care, please complete these orders at your earliest convenience. If you have recently completed these orders please disregard this letter.   To schedule imaging, or outpatient tests please call Central Scheduling at [phone].  For any blood work needed, you can get this done at the Reference Lab in our [place] office anytime Monday-Friday from 7:30am-4:00pm and you do not need an appointment. They are closed for lunch between 12-1pm. They are closed Saturday and Sunday. If you need a time outside of these hours please call us to schedule an appointment.   *If you prefer to use [employer] for your labs please let us know so we can fax your orders.     Thank you,    [facility]

## (undated) NOTE — LETTER
[address] [facility] [address], [address] [facility],[address]                1/2/2019        Trey Baron  [address] [address]      Dear Trey Baron.     To help us provide the highes

## (undated) NOTE — MR AVS SNAPSHOT
[address] [facility] [address] [phone]               Thank you for choosing us for your health care visit with Teresa Taylor MD.  We are glad to serve you and happy to provide you with this summary of your v Where to Get Your Medications      These medications were sent to [facility] OF [address] [phone],  [facility], [address] [place] [address] Drive     Phone:  [phone]    - AmLODIPine Besylate 10 MG Tab

## (undated) NOTE — LETTER
10/05/17        Jazlyn Hays  [address] [address]      Dear Katelyn Charles,    [facility] records indicate that you have outstanding lab work and or testing that was ordered for you and has not yet been completed.   To provide you with the best pos

## (undated) NOTE — LETTER
02/21/19        Jazlyn Hays  [address]      Dear Denisha Pendleton,    [facility] records indicate that you have outstanding testing that was ordered for you and has not yet been completed:       CARD ECHO STRESS TEST  To provide you with

## (undated) NOTE — LETTER
04/23/21        [address] [place] [id number]      Dear Martin Bernabe,    [facility] records indicate that you have outstanding lab work  that was ordered for you and has not yet been completed:      CBC With Diff      CMP      L